# Patient Record
Sex: FEMALE | Race: WHITE | Employment: OTHER | URBAN - NONMETROPOLITAN AREA
[De-identification: names, ages, dates, MRNs, and addresses within clinical notes are randomized per-mention and may not be internally consistent; named-entity substitution may affect disease eponyms.]

---

## 2017-07-07 ENCOUNTER — OFFICE VISIT (OUTPATIENT)
Dept: CARDIOLOGY | Age: 72
End: 2017-07-07

## 2017-07-07 VITALS
BODY MASS INDEX: 35.99 KG/M2 | SYSTOLIC BLOOD PRESSURE: 112 MMHG | WEIGHT: 216 LBS | HEART RATE: 68 BPM | DIASTOLIC BLOOD PRESSURE: 66 MMHG | HEIGHT: 65 IN

## 2017-07-07 DIAGNOSIS — E78.01 FAMILIAL HYPERCHOLESTEROLEMIA: ICD-10-CM

## 2017-07-07 DIAGNOSIS — I25.10 CORONARY ARTERY DISEASE INVOLVING NATIVE CORONARY ARTERY OF NATIVE HEART WITHOUT ANGINA PECTORIS: ICD-10-CM

## 2017-07-07 DIAGNOSIS — I10 ESSENTIAL HYPERTENSION: Primary | ICD-10-CM

## 2017-07-07 PROCEDURE — 93000 ELECTROCARDIOGRAM COMPLETE: CPT | Performed by: NUCLEAR MEDICINE

## 2017-07-07 PROCEDURE — 99213 OFFICE O/P EST LOW 20 MIN: CPT | Performed by: NUCLEAR MEDICINE

## 2017-07-11 ENCOUNTER — OFFICE VISIT (OUTPATIENT)
Dept: FAMILY MEDICINE CLINIC | Age: 72
End: 2017-07-11

## 2017-07-11 VITALS
WEIGHT: 214.8 LBS | HEART RATE: 74 BPM | RESPIRATION RATE: 12 BRPM | HEIGHT: 65 IN | BODY MASS INDEX: 35.79 KG/M2 | DIASTOLIC BLOOD PRESSURE: 66 MMHG | SYSTOLIC BLOOD PRESSURE: 112 MMHG

## 2017-07-11 DIAGNOSIS — K21.9 GASTROESOPHAGEAL REFLUX DISEASE, ESOPHAGITIS PRESENCE NOT SPECIFIED: ICD-10-CM

## 2017-07-11 DIAGNOSIS — E78.00 HYPERCHOLESTEROLEMIA: ICD-10-CM

## 2017-07-11 DIAGNOSIS — F32.A DEPRESSION, UNSPECIFIED DEPRESSION TYPE: ICD-10-CM

## 2017-07-11 DIAGNOSIS — R53.83 FATIGUE, UNSPECIFIED TYPE: ICD-10-CM

## 2017-07-11 DIAGNOSIS — E11.9 TYPE 2 DIABETES MELLITUS WITHOUT COMPLICATION, WITHOUT LONG-TERM CURRENT USE OF INSULIN (HCC): ICD-10-CM

## 2017-07-11 DIAGNOSIS — I10 BENIGN ESSENTIAL HTN: ICD-10-CM

## 2017-07-11 DIAGNOSIS — N32.81 OAB (OVERACTIVE BLADDER): ICD-10-CM

## 2017-07-11 DIAGNOSIS — Z11.59 NEED FOR HEPATITIS C SCREENING TEST: ICD-10-CM

## 2017-07-11 DIAGNOSIS — F41.9 ANXIETY: Primary | ICD-10-CM

## 2017-07-11 PROCEDURE — 99214 OFFICE O/P EST MOD 30 MIN: CPT | Performed by: FAMILY MEDICINE

## 2017-07-11 RX ORDER — LORAZEPAM 0.5 MG/1
0.5 TABLET ORAL EVERY 6 HOURS PRN
Qty: 60 TABLET | Refills: 2 | Status: SHIPPED | OUTPATIENT
Start: 2017-07-11 | End: 2017-10-31 | Stop reason: SDUPTHER

## 2017-07-11 RX ORDER — HYDROCHLOROTHIAZIDE 25 MG/1
25 TABLET ORAL DAILY
Qty: 90 TABLET | Refills: 3 | Status: SHIPPED | OUTPATIENT
Start: 2017-07-11 | End: 2018-07-24 | Stop reason: SDUPTHER

## 2017-07-11 RX ORDER — PROPRANOLOL HYDROCHLORIDE 120 MG/1
120 CAPSULE, EXTENDED RELEASE ORAL DAILY
Qty: 90 CAPSULE | Refills: 3 | Status: SHIPPED | OUTPATIENT
Start: 2017-07-11 | End: 2018-07-24 | Stop reason: SDUPTHER

## 2017-07-11 RX ORDER — CITALOPRAM 20 MG/1
20 TABLET ORAL 2 TIMES DAILY
Qty: 180 TABLET | Refills: 3 | Status: SHIPPED | OUTPATIENT
Start: 2017-07-11 | End: 2018-07-24 | Stop reason: SDUPTHER

## 2017-07-11 RX ORDER — RANITIDINE 150 MG/1
150 CAPSULE ORAL 2 TIMES DAILY
Qty: 180 CAPSULE | Refills: 3 | Status: SHIPPED | OUTPATIENT
Start: 2017-07-11 | End: 2018-07-24 | Stop reason: SDUPTHER

## 2017-07-11 RX ORDER — SIMVASTATIN 40 MG
40 TABLET ORAL NIGHTLY
Qty: 90 TABLET | Refills: 3 | Status: SHIPPED | OUTPATIENT
Start: 2017-07-11 | End: 2018-07-24 | Stop reason: SDUPTHER

## 2017-07-11 RX ORDER — TOLTERODINE 4 MG/1
4 CAPSULE, EXTENDED RELEASE ORAL DAILY
Qty: 90 CAPSULE | Refills: 3 | Status: SHIPPED | OUTPATIENT
Start: 2017-07-11 | End: 2018-04-04

## 2017-07-11 RX ORDER — CLOTRIMAZOLE AND BETAMETHASONE DIPROPIONATE 10; .64 MG/G; MG/G
CREAM TOPICAL
Refills: 0 | COMMUNITY
Start: 2017-05-09 | End: 2017-07-11 | Stop reason: ALTCHOICE

## 2017-07-11 ASSESSMENT — ENCOUNTER SYMPTOMS
VOMITING: 0
SORE THROAT: 0
WHEEZING: 0
BLOOD IN STOOL: 0
CHEST TIGHTNESS: 0
ABDOMINAL PAIN: 0
SHORTNESS OF BREATH: 0
DIARRHEA: 0
EYE PAIN: 0
COUGH: 0
RHINORRHEA: 0
CONSTIPATION: 0
BACK PAIN: 0
NAUSEA: 0

## 2017-07-11 ASSESSMENT — PATIENT HEALTH QUESTIONNAIRE - PHQ9
1. LITTLE INTEREST OR PLEASURE IN DOING THINGS: 0
SUM OF ALL RESPONSES TO PHQ9 QUESTIONS 1 & 2: 0
2. FEELING DOWN, DEPRESSED OR HOPELESS: 0
SUM OF ALL RESPONSES TO PHQ QUESTIONS 1-9: 0

## 2017-07-12 ENCOUNTER — TELEPHONE (OUTPATIENT)
Dept: FAMILY MEDICINE CLINIC | Age: 72
End: 2017-07-12

## 2017-07-12 LAB
ABSOLUTE BASO #: 0.1 K/UL (ref 0–0.1)
ABSOLUTE EOS #: 0.2 K/UL (ref 0.1–0.4)
ABSOLUTE LYMPH #: 2.9 K/UL (ref 0.8–5.2)
ABSOLUTE MONO #: 0.7 K/UL (ref 0.1–0.9)
ABSOLUTE NEUT #: 5.8 K/UL (ref 1.3–9.1)
ALBUMIN SERPL-MCNC: 4 G/DL (ref 3.2–5.3)
ALK PHOSPHATASE: 113 IU/L (ref 35–121)
ALT SERPL-CCNC: 14 IU/L (ref 5–59)
ANION GAP SERPL CALCULATED.3IONS-SCNC: 14 MMOL/L
AST SERPL-CCNC: 16 IU/L (ref 10–42)
AVERAGE GLUCOSE: 120 MG/DL (ref 66–114)
BASOPHILS RELATIVE PERCENT: 0.5 %
BILIRUB SERPL-MCNC: 0.4 MG/DL (ref 0.2–1.3)
BUN BLDV-MCNC: 13 MG/DL (ref 9–24)
CALCIUM SERPL-MCNC: 9.1 MG/DL (ref 8.7–10.8)
CHLORIDE BLD-SCNC: 100 MMOL/L (ref 95–111)
CHOLESTEROL/HDL RATIO: 3.3
CHOLESTEROL: 203 MG/DL
CO2: 29 MMOL/L (ref 21–32)
CREAT SERPL-MCNC: 0.9 MG/DL (ref 0.5–1.3)
EGFR AFRICAN AMERICAN: 75
EGFR IF NONAFRICAN AMERICAN: 62
EOSINOPHILS RELATIVE PERCENT: 1.5 %
GLUCOSE: 102 MG/DL (ref 70–100)
HBA1C MFR BLD: 5.8 % (ref 4.2–5.8)
HCT VFR BLD CALC: 38.7 % (ref 36–48)
HDLC SERPL-MCNC: 61 MG/DL (ref 40–60)
HEMOGLOBIN: 12.6 G/DL (ref 12–16)
HEPATITIS C ANTIBODY: NEGATIVE
LDL CHOLESTEROL CALCULATED: 106 MG/DL
LDL/HDL RATIO: 1.7
LYMPHOCYTE %: 30.3 %
MCH RBC QN AUTO: 28.8 PG (ref 27–34)
MCHC RBC AUTO-ENTMCNC: 32.6 G/DL (ref 31–36)
MCV RBC AUTO: 88.4 FL (ref 80–100)
MONOCYTES # BLD: 7.4 %
NEUTROPHILS RELATIVE PERCENT: 59.9 %
PDW BLD-RTO: 14.1 % (ref 10.8–14.8)
PLATELETS: 295 K/UL (ref 150–450)
POTASSIUM SERPL-SCNC: 4 MMOL/L (ref 3.5–5.4)
RBC: 4.38 M/UL (ref 4–5.5)
SODIUM BLD-SCNC: 139 MMOL/L (ref 134–147)
T4 FREE: 1.01 NG/DL (ref 0.8–1.8)
TOTAL PROTEIN: 6.4 G/DL (ref 5.8–8)
TRIGL SERPL-MCNC: 182 MG/DL
TSH SERPL DL<=0.05 MIU/L-ACNC: 1.71 UIU/ML (ref 0.4–4.4)
VLDLC SERPL CALC-MCNC: 36 MG/DL
WBC: 9.7 K/UL (ref 3.7–10.8)

## 2017-07-17 ENCOUNTER — HOSPITAL ENCOUNTER (OUTPATIENT)
Dept: WOMENS IMAGING | Age: 72
Discharge: HOME | End: 2017-07-17
Payer: MEDICARE

## 2017-07-17 DIAGNOSIS — Z13.9 VISIT FOR SCREENING: ICD-10-CM

## 2017-07-17 PROCEDURE — 77063 BREAST TOMOSYNTHESIS BI: CPT

## 2017-07-27 RX ORDER — NITROGLYCERIN 0.4 MG/1
0.4 TABLET SUBLINGUAL EVERY 5 MIN PRN
Qty: 25 TABLET | Refills: 1 | Status: SHIPPED | OUTPATIENT
Start: 2017-07-27 | End: 2019-07-10 | Stop reason: SDUPTHER

## 2017-08-28 ENCOUNTER — HOSPITAL ENCOUNTER (OUTPATIENT)
Dept: PULMONOLOGY | Age: 72
Discharge: HOME | End: 2017-08-28
Payer: MEDICARE

## 2017-08-28 DIAGNOSIS — Z78.0 ASYMPTOMATIC MENOPAUSAL STATE: ICD-10-CM

## 2017-08-28 PROCEDURE — 94010 BREATHING CAPACITY TEST: CPT

## 2017-08-28 PROCEDURE — 94729 DIFFUSING CAPACITY: CPT

## 2017-08-28 PROCEDURE — 94726 PLETHYSMOGRAPHY LUNG VOLUMES: CPT

## 2017-09-21 ENCOUNTER — OFFICE VISIT (OUTPATIENT)
Dept: FAMILY MEDICINE CLINIC | Age: 72
End: 2017-09-21
Payer: MEDICARE

## 2017-09-21 VITALS
DIASTOLIC BLOOD PRESSURE: 68 MMHG | TEMPERATURE: 98.9 F | BODY MASS INDEX: 36.54 KG/M2 | RESPIRATION RATE: 14 BRPM | WEIGHT: 216.2 LBS | OXYGEN SATURATION: 96 % | SYSTOLIC BLOOD PRESSURE: 110 MMHG | HEART RATE: 68 BPM

## 2017-09-21 DIAGNOSIS — J20.9 ACUTE BRONCHITIS, UNSPECIFIED ORGANISM: Primary | ICD-10-CM

## 2017-09-21 PROCEDURE — 99213 OFFICE O/P EST LOW 20 MIN: CPT | Performed by: FAMILY MEDICINE

## 2017-09-21 RX ORDER — DOXYCYCLINE HYCLATE 100 MG
100 TABLET ORAL 2 TIMES DAILY
Qty: 20 TABLET | Refills: 0 | Status: SHIPPED | OUTPATIENT
Start: 2017-09-21 | End: 2018-09-05 | Stop reason: SDUPTHER

## 2017-09-21 RX ORDER — GUAIFENESIN AND CODEINE PHOSPHATE 100; 10 MG/5ML; MG/5ML
SOLUTION ORAL
Qty: 180 ML | Refills: 0 | Status: SHIPPED | OUTPATIENT
Start: 2017-09-21 | End: 2018-09-05 | Stop reason: SDUPTHER

## 2017-09-21 ASSESSMENT — ENCOUNTER SYMPTOMS
VISUAL CHANGE: 0
DIARRHEA: 0
RHINORRHEA: 0
NAUSEA: 0
ABDOMINAL PAIN: 0
SHORTNESS OF BREATH: 0
WHEEZING: 0
CHANGE IN BOWEL HABIT: 0
SWOLLEN GLANDS: 1
BLOOD IN STOOL: 0
EYE PAIN: 0
CHEST TIGHTNESS: 0
CONSTIPATION: 0
BACK PAIN: 0
COUGH: 1
SINUS PRESSURE: 1
SORE THROAT: 1
VOMITING: 0

## 2017-10-02 ENCOUNTER — IMMUNIZATION (OUTPATIENT)
Dept: FAMILY MEDICINE CLINIC | Age: 72
End: 2017-10-02
Payer: MEDICARE

## 2017-10-02 DIAGNOSIS — Z23 NEED FOR INFLUENZA VACCINATION: Primary | ICD-10-CM

## 2017-10-02 PROCEDURE — 90662 IIV NO PRSV INCREASED AG IM: CPT | Performed by: FAMILY MEDICINE

## 2017-10-02 PROCEDURE — G0008 ADMIN INFLUENZA VIRUS VAC: HCPCS | Performed by: FAMILY MEDICINE

## 2017-10-31 DIAGNOSIS — F41.9 ANXIETY: ICD-10-CM

## 2017-11-01 RX ORDER — LORAZEPAM 0.5 MG/1
0.5 TABLET ORAL EVERY 6 HOURS PRN
Qty: 60 TABLET | Refills: 2 | Status: SHIPPED | OUTPATIENT
Start: 2017-11-01 | End: 2018-02-21 | Stop reason: SDUPTHER

## 2017-11-01 NOTE — TELEPHONE ENCOUNTER
ep'ed ativan to pharm requested    Controlled Substances Monitoring:     Attestation: The Prescription Monitoring Report for this patient was reviewed today. Robbie Ledbetter MD)  Documentation: No signs of potential drug abuse or diversion identified.  Robbie Ledbetter MD)

## 2017-11-29 ENCOUNTER — HOSPITAL ENCOUNTER (OUTPATIENT)
Dept: GENERAL RADIOLOGY | Age: 72
Discharge: HOME | End: 2017-11-29
Payer: MEDICARE

## 2017-11-29 ENCOUNTER — HOSPITAL ENCOUNTER (OUTPATIENT)
Age: 72
Discharge: HOME | End: 2017-11-29
Payer: MEDICARE

## 2017-11-29 ENCOUNTER — HOSPITAL ENCOUNTER (OUTPATIENT)
Dept: CT IMAGING | Age: 72
Discharge: HOME | End: 2017-11-29
Payer: MEDICARE

## 2017-11-29 DIAGNOSIS — C64.2 RENAL CELL CARCINOMA OF LEFT KIDNEY (HCC): ICD-10-CM

## 2017-11-29 DIAGNOSIS — Z78.0 ASYMPTOMATIC MENOPAUSAL STATE: ICD-10-CM

## 2017-11-29 LAB
ALBUMIN SERPL-MCNC: 3.8 G/DL (ref 3.5–5.1)
ALP BLD-CCNC: 98 U/L (ref 38–126)
ALT SERPL-CCNC: 10 U/L (ref 11–66)
ANION GAP SERPL CALCULATED.3IONS-SCNC: 14 MEQ/L (ref 8–16)
ANISOCYTOSIS: ABNORMAL
AST SERPL-CCNC: 14 U/L (ref 5–40)
BASOPHILS # BLD: 0.4 %
BASOPHILS ABSOLUTE: 0 THOU/MM3 (ref 0–0.1)
BILIRUB SERPL-MCNC: 0.3 MG/DL (ref 0.3–1.2)
BUN BLDV-MCNC: 12 MG/DL (ref 7–22)
CALCIUM SERPL-MCNC: 9 MG/DL (ref 8.5–10.5)
CHLORIDE BLD-SCNC: 100 MEQ/L (ref 98–111)
CO2: 29 MEQ/L (ref 23–33)
CREAT SERPL-MCNC: 0.7 MG/DL (ref 0.4–1.2)
EOSINOPHIL # BLD: 1.6 %
EOSINOPHILS ABSOLUTE: 0.1 THOU/MM3 (ref 0–0.4)
GFR SERPL CREATININE-BSD FRML MDRD: 82 ML/MIN/1.73M2
GLUCOSE BLD-MCNC: 117 MG/DL (ref 70–108)
HCT VFR BLD CALC: 37.5 % (ref 37–47)
HEMOGLOBIN: 12.4 GM/DL (ref 12–16)
LD: 159 U/L (ref 100–190)
LYMPHOCYTES # BLD: 25 %
LYMPHOCYTES ABSOLUTE: 2.1 THOU/MM3 (ref 1–4.8)
MCH RBC QN AUTO: 29.3 PG (ref 27–31)
MCHC RBC AUTO-ENTMCNC: 33 GM/DL (ref 33–37)
MCV RBC AUTO: 88.8 FL (ref 81–99)
MONOCYTES # BLD: 8.4 %
MONOCYTES ABSOLUTE: 0.7 THOU/MM3 (ref 0.4–1.3)
NUCLEATED RED BLOOD CELLS: 0 /100 WBC
PDW BLD-RTO: 15.4 % (ref 11.5–14.5)
PLATELET # BLD: 268 THOU/MM3 (ref 130–400)
PMV BLD AUTO: 9 MCM (ref 7.4–10.4)
POC CREATININE WHOLE BLOOD: 0.8 MG/DL (ref 0.5–1.2)
POTASSIUM SERPL-SCNC: 3.7 MEQ/L (ref 3.5–5.2)
RBC # BLD: 4.22 MILL/MM3 (ref 4.2–5.4)
SEG NEUTROPHILS: 64.6 %
SEGMENTED NEUTROPHILS ABSOLUTE COUNT: 5.4 THOU/MM3 (ref 1.8–7.7)
SODIUM BLD-SCNC: 143 MEQ/L (ref 135–145)
TOTAL PROTEIN: 6.7 G/DL (ref 6.1–8)
WBC # BLD: 8.3 THOU/MM3 (ref 4.8–10.8)

## 2017-11-29 PROCEDURE — 80053 COMPREHEN METABOLIC PANEL: CPT

## 2017-11-29 PROCEDURE — 71020 XR CHEST STANDARD TWO VW: CPT

## 2017-11-29 PROCEDURE — 82565 ASSAY OF CREATININE: CPT

## 2017-11-29 PROCEDURE — 83615 LACTATE (LD) (LDH) ENZYME: CPT

## 2017-11-29 PROCEDURE — 6360000004 HC RX CONTRAST MEDICATION: Performed by: PHYSICIAN ASSISTANT

## 2017-11-29 PROCEDURE — 74178 CT ABD&PLV WO CNTR FLWD CNTR: CPT

## 2017-11-29 PROCEDURE — 85025 COMPLETE CBC W/AUTO DIFF WBC: CPT

## 2017-11-29 PROCEDURE — 36415 COLL VENOUS BLD VENIPUNCTURE: CPT

## 2017-11-29 RX ADMIN — IOPAMIDOL 85 ML: 755 INJECTION, SOLUTION INTRAVENOUS at 10:41

## 2017-11-29 RX ADMIN — IOHEXOL 50 ML: 240 INJECTION, SOLUTION INTRATHECAL; INTRAVASCULAR; INTRAVENOUS; ORAL at 10:42

## 2017-11-30 ENCOUNTER — TELEPHONE (OUTPATIENT)
Dept: UROLOGY | Age: 72
End: 2017-11-30

## 2017-11-30 NOTE — TELEPHONE ENCOUNTER
Patient is calling and was wanting to get her CT results for the insurance.  Please advise at [phone]

## 2017-12-04 ENCOUNTER — TELEPHONE (OUTPATIENT)
Dept: UROLOGY | Age: 72
End: 2017-12-04

## 2017-12-07 ENCOUNTER — OFFICE VISIT (OUTPATIENT)
Dept: UROLOGY | Age: 72
End: 2017-12-07
Payer: MEDICARE

## 2017-12-07 VITALS
BODY MASS INDEX: 35.29 KG/M2 | SYSTOLIC BLOOD PRESSURE: 114 MMHG | DIASTOLIC BLOOD PRESSURE: 70 MMHG | WEIGHT: 211.8 LBS | HEIGHT: 65 IN

## 2017-12-07 DIAGNOSIS — C64.2 RENAL CELL CARCINOMA OF LEFT KIDNEY (HCC): Primary | ICD-10-CM

## 2017-12-07 DIAGNOSIS — R31.29 MICROSCOPIC HEMATURIA: ICD-10-CM

## 2017-12-07 LAB
BACTERIA: ABNORMAL /HPF
BILIRUBIN URINE: NEGATIVE
BILIRUBIN URINE: NEGATIVE
BLOOD URINE, POC: NORMAL
BLOOD, URINE: NEGATIVE
CASTS 2: ABNORMAL /LPF
CASTS UA: ABNORMAL /LPF
CHARACTER, URINE: CLEAR
CHARACTER, URINE: CLEAR
COLOR, URINE: YELLOW
COLOR: YELLOW
CRYSTALS, UA: ABNORMAL
EPITHELIAL CELLS, UA: ABNORMAL /HPF
GLUCOSE URINE: NEGATIVE MG/DL
GLUCOSE URINE: NEGATIVE MG/DL
KETONES, URINE: NEGATIVE
KETONES, URINE: NEGATIVE
LEUKOCYTE CLUMPS, URINE: NORMAL
LEUKOCYTE ESTERASE, URINE: ABNORMAL
MISCELLANEOUS 2: ABNORMAL
NITRITE, URINE: NEGATIVE
NITRITE, URINE: NEGATIVE
PH UA: 6
PH, URINE: 6
PROTEIN UA: NEGATIVE
PROTEIN, URINE: NEGATIVE MG/DL
RBC URINE: ABNORMAL /HPF
RENAL EPITHELIAL, UA: ABNORMAL
SPECIFIC GRAVITY, URINE: 1.02 (ref 1–1.03)
SPECIFIC GRAVITY, URINE: 1.02 (ref 1–1.03)
UROBILINOGEN, URINE: 0.2 EU/DL
UROBILINOGEN, URINE: 0.2 EU/DL
WBC UA: ABNORMAL /HPF
YEAST: ABNORMAL

## 2017-12-07 PROCEDURE — 81003 URINALYSIS AUTO W/O SCOPE: CPT | Performed by: PHYSICIAN ASSISTANT

## 2017-12-07 PROCEDURE — 99213 OFFICE O/P EST LOW 20 MIN: CPT | Performed by: PHYSICIAN ASSISTANT

## 2017-12-07 NOTE — PROGRESS NOTES
Not on file     Other Topics Concern    Not on file     Social History Narrative    No narrative on file       Review of Systems  No problems with ears, nose or throat. No problems with eyes. No chest pain, shortness of breath, abdominal pain, extremity pain or weakness, and no neurological deficits. No rashes. No swollen glands or lymph nodes.  symptoms per HPI. The remainder of the review of symptoms is negative. Exam    /70   Ht 5' 5\" (1.651 m)   Wt 211 lb 12.8 oz (96.1 kg)   BMI 35.25 kg/m²     General: alert and oriented. Cooperative. HENT: Normocephalic, Atraumatic. Eyes: No scleral icterus, mucous membranes moist.  Respiratory: Effort normal. Clear bilaterally without crackles, rhonchi, wheezing. Heart: Regular rate and rhythm. S1 and S2 normal.   Abdomen: Soft. Non-tender. Non-distended. Active bowel sounds. No CVA tenderness. Neuro: No deficits. Extremities: No pitting edema or calf tenderness. Skin: No rashes or obvious lesions. Nursing note and vitals reviewed. Labs    Results for POC orders placed in visit on 12/07/17   POCT Urinalysis No Micro (Auto)   Result Value Ref Range    Glucose, Ur Negative NEGATIVE mg/dl    Bilirubin Urine Negative     Ketones, Urine Negative NEGATIVE    Specific Gravity, Urine 1.020 1.002 - 1.03    Blood, UA POC Trace-intact NEGATIVE    pH, Urine 6.00 5.0 - 9.0    Protein, Urine Negative NEGATIVE mg/dl    Urobilinogen, Urine 0.20 0.0 - 1.0 eu/dl    Nitrite, Urine Negative NEGATIVE    Leukocyte Clumps, Urine Moderate NEGATIVE    Color, Urine Yellow YELLOW-STR    Character, Urine Clear CLR-SL.[id number]       Lab Results   Component Value Date    CREATININE 0.7 11/29/2017    BUN 12 11/29/2017     11/29/2017    K 3.7 11/29/2017     11/29/2017    CO2 29 11/29/2017     Radiology: CT abdomen and pelvis    FINDINGS: The liver size and attenuation is satisfactory, stable. There is no evidence of focal lesion within the liver.  The gallbladder surgically absent. There is no convincing biliary ductal abnormality. Portal venous system is acceptable. Pancreas is    unchanged. Spleen is within normal limits.       The bowels are decompressed. Mild diverticulosis of the distal colon redemonstrated. Mesenteric fat is clear. Normal appendix. No evidence of free fluid or free air.       Stable postsurgical appearance of the left kidney. Postcontrast images reveal stable homogeneous uptake of contrast in the remaining cortex. There is no convincing focal lesion. Stable appearance of the contour of the left kidney. There are no    pathologically enlarged retroperitoneal lymph nodes. No radiopaque nephrolithiasis. No hydronephrosis.       Stable and satisfactory appearance of both adrenal glands.       Retrograde have vasculature is unchanged, with diffuse atherosclerotic plaque to the aorta redemonstrated, mild. Retroperitoneal fat is clear throughout.       Pelvic sidewalls are clear. Uterus and adnexa have no concerning findings. Urine in bladder is acceptable. Pelvic floor soft tissues are grossly satisfactory.       Images of the lung bases have no evidence of focal abnormal mass. There is some stranding of the inferior lung parenchyma bilaterally to indicate fibrosis and/or platelike atelectasis. This is similar to the appearance of the imaged lung parenchyma on    the most recent CT.       The osseous framework is grossly stable, with no findings of acute or aggressive osseous abnormality. Soft tissues are nonspecific.           Impression   STABLE POSTSURGICAL APPEARANCE OF THE LEFT KIDNEY, WITH STABLE EXAMINATION OF THE ABDOMEN AND PELVIS. NO FINDINGS OF DISEASE PROGRESSION OR METASTATIC DISEASE. NO ACUTE ABNORMALITY. CXR:  FINDINGS: The heart, mediastinum and pulmonary vasculature appear stable. There is stable eventration of the right hemidiaphragm posteriorly. No new or acute pulmonary process is demonstrated.  There are degenerative changes along the

## 2017-12-08 LAB
ORGANISM: ABNORMAL
URINE CULTURE REFLEX: ABNORMAL

## 2017-12-10 ENCOUNTER — TELEPHONE (OUTPATIENT)
Dept: UROLOGY | Age: 72
End: 2017-12-10

## 2017-12-10 DIAGNOSIS — N30.00 ACUTE CYSTITIS WITHOUT HEMATURIA: Primary | ICD-10-CM

## 2017-12-10 RX ORDER — SULFAMETHOXAZOLE AND TRIMETHOPRIM 800; 160 MG/1; MG/1
1 TABLET ORAL 2 TIMES DAILY
Qty: 6 TABLET | Refills: 0 | Status: SHIPPED | OUTPATIENT
Start: 2017-12-10 | End: 2017-12-13

## 2017-12-10 NOTE — PROGRESS NOTES
I have reviewed the patients chart and Jr Walker has discussed relevant portions with me. I agree with the assessment and plan.

## 2017-12-27 DIAGNOSIS — J45.40 MODERATE PERSISTENT ASTHMA WITHOUT COMPLICATION: Primary | ICD-10-CM

## 2017-12-27 RX ORDER — BUDESONIDE AND FORMOTEROL FUMARATE DIHYDRATE 160; 4.5 UG/1; UG/1
2 AEROSOL RESPIRATORY (INHALATION) 2 TIMES DAILY
Qty: 1 INHALER | Refills: 6 | Status: SHIPPED | OUTPATIENT
Start: 2017-12-27 | End: 2018-07-24 | Stop reason: SDUPTHER

## 2017-12-27 NOTE — TELEPHONE ENCOUNTER
Pt Noris Kay ran out of Symbicort yesterday; Dr Brianda Willard who was prescribing before is no longer her pulmonologist, so she's asking you to take over refills. Uses [facility]. Pls call pt at [phone] only if probs.   Last ov: 9/21/17 bronchitis

## 2018-02-21 DIAGNOSIS — F41.9 ANXIETY: ICD-10-CM

## 2018-02-21 RX ORDER — LORAZEPAM 0.5 MG/1
0.5 TABLET ORAL EVERY 6 HOURS PRN
Qty: 60 TABLET | Refills: 0 | Status: SHIPPED | OUTPATIENT
Start: 2018-02-21 | End: 2018-04-04 | Stop reason: SDUPTHER

## 2018-02-21 NOTE — TELEPHONE ENCOUNTER
Patient notified 1 month supply of ativan sent to the pharm and will be due for 6 month appt for further refills, pt verbalized understanding.

## 2018-02-21 NOTE — TELEPHONE ENCOUNTER
Edilson Manual needs refill of   Requested Prescriptions     Pending Prescriptions Disp Refills    LORazepam (ATIVAN) 0.5 MG tablet 60 tablet 2     Sig: Take 1 tablet by mouth every 6 hours as needed for Anxiety F41.9.        Last Filled on:  11/1/17 #60/2    Last Visit Date:  9/21/17 bronchitis      Next Visit Date:    Visit date not found  pending

## 2018-04-04 ENCOUNTER — OFFICE VISIT (OUTPATIENT)
Dept: FAMILY MEDICINE CLINIC | Age: 73
End: 2018-04-04
Payer: MEDICARE

## 2018-04-04 VITALS
HEIGHT: 65 IN | HEART RATE: 78 BPM | DIASTOLIC BLOOD PRESSURE: 66 MMHG | SYSTOLIC BLOOD PRESSURE: 100 MMHG | RESPIRATION RATE: 12 BRPM | BODY MASS INDEX: 35.69 KG/M2 | WEIGHT: 214.2 LBS

## 2018-04-04 DIAGNOSIS — F41.9 ANXIETY: ICD-10-CM

## 2018-04-04 PROCEDURE — 99213 OFFICE O/P EST LOW 20 MIN: CPT | Performed by: FAMILY MEDICINE

## 2018-04-04 RX ORDER — LORAZEPAM 0.5 MG/1
0.5 TABLET ORAL EVERY 6 HOURS PRN
Qty: 60 TABLET | Refills: 2 | Status: SHIPPED | OUTPATIENT
Start: 2018-04-04 | End: 2018-07-24 | Stop reason: SDUPTHER

## 2018-04-04 RX ORDER — CALCIUM CARBONATE 500(1250)
500 TABLET ORAL DAILY
COMMUNITY
End: 2019-06-18

## 2018-04-04 ASSESSMENT — ENCOUNTER SYMPTOMS
BACK PAIN: 0
EYE PAIN: 0
DIARRHEA: 0
RHINORRHEA: 0
COUGH: 0
WHEEZING: 0
SHORTNESS OF BREATH: 0
NAUSEA: 0
CHEST TIGHTNESS: 0
VOMITING: 0
SORE THROAT: 0
ABDOMINAL PAIN: 0
BLOOD IN STOOL: 0
CONSTIPATION: 0

## 2018-07-09 ENCOUNTER — OFFICE VISIT (OUTPATIENT)
Dept: CARDIOLOGY CLINIC | Age: 73
End: 2018-07-09
Payer: MEDICARE

## 2018-07-09 VITALS
BODY MASS INDEX: 34.23 KG/M2 | SYSTOLIC BLOOD PRESSURE: 120 MMHG | HEART RATE: 73 BPM | DIASTOLIC BLOOD PRESSURE: 72 MMHG | WEIGHT: 213 LBS | HEIGHT: 66 IN

## 2018-07-09 DIAGNOSIS — I25.10 CORONARY ARTERY DISEASE INVOLVING NATIVE CORONARY ARTERY OF NATIVE HEART WITHOUT ANGINA PECTORIS: ICD-10-CM

## 2018-07-09 DIAGNOSIS — E78.01 FAMILIAL HYPERCHOLESTEROLEMIA: ICD-10-CM

## 2018-07-09 DIAGNOSIS — I10 ESSENTIAL HYPERTENSION: Primary | ICD-10-CM

## 2018-07-09 PROCEDURE — 93000 ELECTROCARDIOGRAM COMPLETE: CPT | Performed by: NUCLEAR MEDICINE

## 2018-07-09 PROCEDURE — 99213 OFFICE O/P EST LOW 20 MIN: CPT | Performed by: NUCLEAR MEDICINE

## 2018-07-09 RX ORDER — LORAZEPAM 0.5 MG/1
TABLET ORAL
Refills: 2 | COMMUNITY
Start: 2018-06-19 | End: 2018-07-24 | Stop reason: SDUPTHER

## 2018-07-09 NOTE — PROGRESS NOTES
[facility] PROFESSIONAL SERVS  [facility]  [address]  [address]  [place] [address]  Dept: [phone]  Dept Fax: [phone]  Loc: [phone]    Visit Date: 7/9/2018    Berto Paez is a 68 y.o. female who presents today for:  Chief Complaint   Patient presents with    1 Year Follow Up    Hypertension    Coronary Artery Disease    Hyperlipidemia     Known mild CAD  Renal cancer is stable   BP is stable   On statins for hyperlipidemia  No chest pain  No changes in breathing    HPI:  HPI  Past Medical History:   Diagnosis Date    Anxiety     Arthritis     Asthma     Cancer ([address]     Depression     Diabetes mellitus ([address]     Headache(784.0)     Hyperlipidemia     Hypertension     Nausea & vomiting     Unspecified cerebral artery occlusion with cerebral infarction       Past Surgical History:   Procedure Laterality Date    BREAST BIOPSY      CARDIAC CATHETERIZATION      CHOLECYSTECTOMY      COLONOSCOPY      ENDOSCOPY, COLON, DIAGNOSTIC      FINGER SURGERY  tumor    HERNIA REPAIR      HIATAL HERNIA REPAIR  7-    stomach repair    PARTIAL NEPHRECTOMY Left 11/21/2014    robotic left partial nephrectomy    TONSILLECTOMY      TUBAL LIGATION       Family History   Problem Relation Age of Onset    Cancer Mother     Heart Disease Mother     High Blood Pressure Mother     High Cholesterol Mother     Stroke Mother     Cancer Father     Heart Disease Father     High Blood Pressure Father     High Cholesterol Father     Cancer Sister      Social History   Substance Use Topics    Smoking status: Never Smoker    Smokeless tobacco: Never Used    Alcohol use No      Current Outpatient Prescriptions   Medication Sig Dispense Refill    LORazepam (ATIVAN) 0.5 MG tablet TAKE 1 TABLET BY MOUTH EVERY 6 HOURS AS NEEDED FOR ANXIETY  2    calcium carbonate (OSCAL) 500 MG TABS tablet Take 500 mg by mouth daily      budesonide-formoterol (SYMBICORT) 160-4.5 MCG/ACT AERO  Creatinine monitoring  11/29/2018    Breast cancer screen  07/17/2019    Colon cancer screen colonoscopy  10/15/2020    Lipid screen  07/11/2022    DEXA (modify frequency per FRAX score)  Completed    Hepatitis C screen  Completed       Subjective:  Review of Systems  General:   No fever, no chills, No fatigue or weight loss  Pulmonary:    No dyspnea, no wheezing  Cardiac:    Denies recent chest pain,   GI:     No nausea or vomiting, no abdominal pain  Neuro:    No dizziness or light headedness,   Musculoskeletal:  No recent active issues  Extremities:   No edema, good peripheral pulses      Objective:  Physical Exam  /72   Pulse 73   Ht 5' 5.5\" (1.664 m)   Wt 213 lb (96.6 kg)   BMI 34.91 kg/m²   General:   Well developed, well nourished  Lungs:   Clear to auscultation  Heart:    Normal S1 S2, Slight murmur. no rubs, no gallops  Abdomen:   Soft, non tender, no organomegalies, positive bowel sounds  Extremities:   No edema, no cyanosis, good peripheral pulses  Neurological:   Awake, alert, oriented. No obvious focal deficits  Musculoskelatal:  No obvious deformities    Assessment:      Diagnosis Orders   1. Essential hypertension     2. Coronary artery disease involving native coronary artery of native heart without angina pectoris     3. Familial hypercholesterolemia     cardiac fair for now  ECG in office was done today. I reviewed the ECG. No acute findings    Plan:  No Follow-up on file. As above  Continue risk factor modification and medical management  'Thank you for allowing me to participate in the care of your patient. Please don't hesitate to contact me regarding any further issues related to the patient care    Orders Placed:  Orders Placed This Encounter   Procedures    EKG 12 Lead     Order Specific Question:   Reason for Exam?     Answer: Other       Medications Prescribed:  No orders of the defined types were placed in this encounter.          Discussed use, benefit, and side

## 2018-07-09 NOTE — PROGRESS NOTES
Patient here for 1 year follow up. EKG done today. Patient denies SOB. Chest pain. Palpitations. Edema.

## 2018-07-21 DIAGNOSIS — F32.A DEPRESSION, UNSPECIFIED DEPRESSION TYPE: ICD-10-CM

## 2018-07-23 RX ORDER — CITALOPRAM 20 MG/1
20 TABLET ORAL 2 TIMES DAILY
Qty: 180 TABLET | Refills: 3 | OUTPATIENT
Start: 2018-07-23

## 2018-07-23 NOTE — TELEPHONE ENCOUNTER
Marleny Eller needs refill of   Requested Prescriptions     Pending Prescriptions Disp Refills    citalopram (CELEXA) 20 MG tablet [Pharmacy Med Name: CITALOPRAM HBR 20 MG TABLET] 180 tablet 3     Sig: TAKE 1 TABLET BY MOUTH 2 TIMES DAILY       Last Filled on:  7/11/2017    Last Visit Date:  4/4/2018 7/11/2017 annual    Next Visit Date:    Visit date not found    Pt is due for annual appointment, please contact Pt to schedule.

## 2018-07-24 ENCOUNTER — OFFICE VISIT (OUTPATIENT)
Dept: FAMILY MEDICINE CLINIC | Age: 73
End: 2018-07-24
Payer: MEDICARE

## 2018-07-24 VITALS
BODY MASS INDEX: 36.43 KG/M2 | HEIGHT: 64 IN | DIASTOLIC BLOOD PRESSURE: 64 MMHG | WEIGHT: 213.4 LBS | RESPIRATION RATE: 12 BRPM | SYSTOLIC BLOOD PRESSURE: 100 MMHG | HEART RATE: 64 BPM

## 2018-07-24 DIAGNOSIS — Z00.00 ANNUAL PHYSICAL EXAM: Primary | ICD-10-CM

## 2018-07-24 DIAGNOSIS — E78.00 HYPERCHOLESTEROLEMIA: ICD-10-CM

## 2018-07-24 DIAGNOSIS — I10 ESSENTIAL HYPERTENSION: ICD-10-CM

## 2018-07-24 DIAGNOSIS — F32.A DEPRESSION, UNSPECIFIED DEPRESSION TYPE: ICD-10-CM

## 2018-07-24 DIAGNOSIS — Z23 NEED FOR PNEUMOCOCCAL VACCINATION: ICD-10-CM

## 2018-07-24 DIAGNOSIS — Z12.39 SCREENING FOR BREAST CANCER: ICD-10-CM

## 2018-07-24 DIAGNOSIS — E11.9 TYPE 2 DIABETES MELLITUS WITHOUT COMPLICATION, WITHOUT LONG-TERM CURRENT USE OF INSULIN (HCC): ICD-10-CM

## 2018-07-24 DIAGNOSIS — K21.9 GASTROESOPHAGEAL REFLUX DISEASE, ESOPHAGITIS PRESENCE NOT SPECIFIED: ICD-10-CM

## 2018-07-24 DIAGNOSIS — F41.9 ANXIETY: ICD-10-CM

## 2018-07-24 DIAGNOSIS — J45.40 MODERATE PERSISTENT ASTHMA WITHOUT COMPLICATION: ICD-10-CM

## 2018-07-24 DIAGNOSIS — I10 BENIGN ESSENTIAL HTN: ICD-10-CM

## 2018-07-24 LAB
ALBUMIN SERPL-MCNC: 4.2 G/DL (ref 3.5–5.1)
ALP BLD-CCNC: 116 U/L (ref 38–126)
ALT SERPL-CCNC: 12 U/L (ref 11–66)
ANION GAP SERPL CALCULATED.3IONS-SCNC: 14 MEQ/L (ref 8–16)
AST SERPL-CCNC: 17 U/L (ref 5–40)
AVERAGE GLUCOSE: 120 MG/DL (ref 70–126)
BASOPHILS # BLD: 0.5 %
BASOPHILS ABSOLUTE: 0 THOU/MM3 (ref 0–0.1)
BILIRUB SERPL-MCNC: 0.4 MG/DL (ref 0.3–1.2)
BUN BLDV-MCNC: 13 MG/DL (ref 7–22)
CALCIUM SERPL-MCNC: 9.4 MG/DL (ref 8.5–10.5)
CHLORIDE BLD-SCNC: 99 MEQ/L (ref 98–111)
CHOLESTEROL, TOTAL: 187 MG/DL (ref 100–199)
CO2: 29 MEQ/L (ref 23–33)
CREAT SERPL-MCNC: 0.8 MG/DL (ref 0.4–1.2)
CREATININE URINE POCT: 300
EOSINOPHIL # BLD: 2.1 %
EOSINOPHILS ABSOLUTE: 0.2 THOU/MM3 (ref 0–0.4)
ERYTHROCYTE [DISTWIDTH] IN BLOOD BY AUTOMATED COUNT: 14.6 % (ref 11.5–14.5)
ERYTHROCYTE [DISTWIDTH] IN BLOOD BY AUTOMATED COUNT: 48.3 FL (ref 35–45)
GFR SERPL CREATININE-BSD FRML MDRD: 70 ML/MIN/1.73M2
GLUCOSE BLD-MCNC: 119 MG/DL (ref 70–108)
HBA1C MFR BLD: 6 % (ref 4.4–6.4)
HCT VFR BLD CALC: 40 % (ref 37–47)
HDLC SERPL-MCNC: 60 MG/DL
HEMOGLOBIN: 13.2 GM/DL (ref 12–16)
IMMATURE GRANS (ABS): 0.03 THOU/MM3 (ref 0–0.07)
IMMATURE GRANULOCYTES: 0.3 %
LDL CHOLESTEROL CALCULATED: 95 MG/DL
LYMPHOCYTES # BLD: 29.1 %
LYMPHOCYTES ABSOLUTE: 2.6 THOU/MM3 (ref 1–4.8)
MCH RBC QN AUTO: 29.6 PG (ref 26–33)
MCHC RBC AUTO-ENTMCNC: 33 GM/DL (ref 32.2–35.5)
MCV RBC AUTO: 89.7 FL (ref 81–99)
MICROALBUMIN/CREAT 24H UR: 80 MG/G{CREAT}
MICROALBUMIN/CREAT UR-RTO: NORMAL
MONOCYTES # BLD: 7.3 %
MONOCYTES ABSOLUTE: 0.6 THOU/MM3 (ref 0.4–1.3)
NUCLEATED RED BLOOD CELLS: 0 /100 WBC
PLATELET # BLD: 313 THOU/MM3 (ref 130–400)
PMV BLD AUTO: 11 FL (ref 9.4–12.4)
POTASSIUM SERPL-SCNC: 4.2 MEQ/L (ref 3.5–5.2)
RBC # BLD: 4.46 MILL/MM3 (ref 4.2–5.4)
SEG NEUTROPHILS: 60.7 %
SEGMENTED NEUTROPHILS ABSOLUTE COUNT: 5.4 THOU/MM3 (ref 1.8–7.7)
SODIUM BLD-SCNC: 142 MEQ/L (ref 135–145)
TOTAL PROTEIN: 7.1 G/DL (ref 6.1–8)
TRIGL SERPL-MCNC: 160 MG/DL (ref 0–199)
WBC # BLD: 8.9 THOU/MM3 (ref 4.8–10.8)

## 2018-07-24 PROCEDURE — 99214 OFFICE O/P EST MOD 30 MIN: CPT | Performed by: FAMILY MEDICINE

## 2018-07-24 PROCEDURE — 36415 COLL VENOUS BLD VENIPUNCTURE: CPT | Performed by: FAMILY MEDICINE

## 2018-07-24 PROCEDURE — 90732 PPSV23 VACC 2 YRS+ SUBQ/IM: CPT | Performed by: FAMILY MEDICINE

## 2018-07-24 PROCEDURE — 82044 UR ALBUMIN SEMIQUANTITATIVE: CPT | Performed by: FAMILY MEDICINE

## 2018-07-24 PROCEDURE — G0009 ADMIN PNEUMOCOCCAL VACCINE: HCPCS | Performed by: FAMILY MEDICINE

## 2018-07-24 RX ORDER — SIMVASTATIN 40 MG
40 TABLET ORAL NIGHTLY
Qty: 90 TABLET | Refills: 3 | Status: SHIPPED | OUTPATIENT
Start: 2018-07-24 | End: 2019-07-09 | Stop reason: SDUPTHER

## 2018-07-24 RX ORDER — LORAZEPAM 0.5 MG/1
0.5 TABLET ORAL EVERY 6 HOURS PRN
Qty: 60 TABLET | Refills: 2 | Status: SHIPPED | OUTPATIENT
Start: 2018-07-24 | End: 2018-11-06 | Stop reason: SDUPTHER

## 2018-07-24 RX ORDER — HYDROCHLOROTHIAZIDE 25 MG/1
25 TABLET ORAL DAILY
Qty: 90 TABLET | Refills: 3 | Status: SHIPPED | OUTPATIENT
Start: 2018-07-24 | End: 2019-04-16 | Stop reason: ALTCHOICE

## 2018-07-24 RX ORDER — CITALOPRAM 20 MG/1
20 TABLET ORAL 2 TIMES DAILY
Qty: 180 TABLET | Refills: 3 | Status: SHIPPED | OUTPATIENT
Start: 2018-07-24 | End: 2019-07-09 | Stop reason: SDUPTHER

## 2018-07-24 RX ORDER — BUDESONIDE AND FORMOTEROL FUMARATE DIHYDRATE 160; 4.5 UG/1; UG/1
2 AEROSOL RESPIRATORY (INHALATION) 2 TIMES DAILY
Qty: 1 INHALER | Refills: 11 | Status: SHIPPED | OUTPATIENT
Start: 2018-07-24 | End: 2019-07-09 | Stop reason: SDUPTHER

## 2018-07-24 RX ORDER — ACETAMINOPHEN 500 MG
500 TABLET ORAL EVERY 6 HOURS PRN
COMMUNITY

## 2018-07-24 RX ORDER — RANITIDINE 150 MG/1
150 CAPSULE ORAL 2 TIMES DAILY
Qty: 180 CAPSULE | Refills: 3 | Status: SHIPPED | OUTPATIENT
Start: 2018-07-24 | End: 2019-07-09 | Stop reason: SDUPTHER

## 2018-07-24 RX ORDER — ALBUTEROL SULFATE 90 UG/1
AEROSOL, METERED RESPIRATORY (INHALATION)
Qty: 1 INHALER | Refills: 11 | Status: SHIPPED | OUTPATIENT
Start: 2018-07-24 | End: 2019-04-16 | Stop reason: SDUPTHER

## 2018-07-24 RX ORDER — PROPRANOLOL HYDROCHLORIDE 120 MG/1
120 CAPSULE, EXTENDED RELEASE ORAL DAILY
Qty: 90 CAPSULE | Refills: 3 | Status: SHIPPED | OUTPATIENT
Start: 2018-07-24 | End: 2019-07-09 | Stop reason: SDUPTHER

## 2018-07-24 ASSESSMENT — ENCOUNTER SYMPTOMS
ABDOMINAL PAIN: 0
SHORTNESS OF BREATH: 0
SORE THROAT: 0
BACK PAIN: 0
VOMITING: 0
CONSTIPATION: 0
BLOOD IN STOOL: 0
EYE PAIN: 0
WHEEZING: 0
CHEST TIGHTNESS: 0
NAUSEA: 0
DIARRHEA: 0
RHINORRHEA: 0
COUGH: 0

## 2018-07-24 ASSESSMENT — PATIENT HEALTH QUESTIONNAIRE - PHQ9
2. FEELING DOWN, DEPRESSED OR HOPELESS: 0
SUM OF ALL RESPONSES TO PHQ9 QUESTIONS 1 & 2: 0
1. LITTLE INTEREST OR PLEASURE IN DOING THINGS: 0
SUM OF ALL RESPONSES TO PHQ QUESTIONS 1-9: 0

## 2018-07-24 NOTE — PATIENT INSTRUCTIONS
health and other things that can increase your risk for heart attack and stroke. · Blood pressure. Have your blood pressure checked during a routine doctor visit. Your doctor will tell you how often to check your blood pressure based on your age, your blood pressure results, and other factors. · Diabetes. Ask your doctor whether you should have tests for diabetes. · Vision. Experts recommend that you have yearly exams for glaucoma and other age-related eye problems. · Hearing. Tell your doctor if you notice any change in your hearing. You can have tests to find out how well you hear. · Colon cancer tests. Keep having colon cancer tests as your doctor recommends. You can have one of several types of tests. · Heart attack and stroke risk. At least every 4 to 6 years, you should have your risk for heart attack and stroke assessed. Your doctor uses factors such as your age, blood pressure, cholesterol, and whether you smoke or have diabetes to show what your risk for a heart attack or stroke is over the next 10 years. · Osteoporosis. Talk to your doctor about whether you should have a bone density test to find out whether you have thinning bones. Also ask your doctor about whether you should take calcium and vitamin D supplements. For women  · Pap test and pelvic exam. You may no longer need a Pap test. Talk with your doctor about whether to stop or continue to have Pap tests. · Breast exam and mammogram. Ask how often you should have a mammogram, which is an X-ray of your breasts. A mammogram can spot breast cancer before it can be felt and when it is easiest to treat. · Thyroid disease. Talk to your doctor about whether to have your thyroid checked as part of a regular physical exam. Women have an increased chance of a thyroid problem. For men  · Prostate exam. Talk to your doctor about whether you should have a blood test (called a PSA test) for prostate cancer.  Experts disagree on whether men should have this test. Some experts recommend that you discuss the benefits and risks of the test with your doctor. · Abdominal aortic aneurysm. Ask your doctor whether you should have a test to check for an aneurysm. You may need a test if you ever smoked or if your parent, brother, sister, or child has had an aneurysm. When should you call for help? Watch closely for changes in your health, and be sure to contact your doctor if you have any problems or symptoms that concern you. Where can you learn more? Go to https://Transcepta.[employer]. org and sign in to your BlackJet account. Enter K329 in the Botanical Tans box to learn more about \"Well Visit, Over 65: Care Instructions. \"     If you do not have an account, please click on the \"Sign Up Now\" link. Current as of: May 16, 2017  Content Version: 11.6  © 7299-3833 [employer]. Care instructions adapted under license by [facility]"[employer] [facility] ([facility]). If you have questions about a medical condition or this instruction, always ask your healthcare professional. [place] [address] any warranty or liability for your use of this information. Patient Education        High Blood Pressure: Care Instructions  Your Care Instructions    If your blood pressure is usually above 130/80, you have high blood pressure, or hypertension. That means the top number is 130 or higher or the bottom number is 80 or higher, or both. Despite what a lot of people think, high blood pressure usually doesn't cause headaches or make you feel dizzy or lightheaded. It usually has no symptoms. But it does increase your risk for heart attack, stroke, and kidney or eye damage. The higher your blood pressure, the more your risk increases. Your doctor will give you a goal for your blood pressure. Your goal will be based on your health and your age. Lifestyle changes, such as eating healthy and being active, are always important to help lower blood pressure.  You might also take medicine to reach your blood pressure goal.  Follow-up care is a key part of your treatment and safety. Be sure to make and go to all appointments, and call your doctor if you are having problems. It's also a good idea to know your test results and keep a list of the medicines you take. How can you care for yourself at home? Medical treatment  · If you stop taking your medicine, your blood pressure will go back up. You may take one or more types of medicine to lower your blood pressure. Be safe with medicines. Take your medicine exactly as prescribed. Call your doctor if you think you are having a problem with your medicine. · Talk to your doctor before you start taking aspirin every day. Aspirin can help certain people lower their risk of a heart attack or stroke. But taking aspirin isn't right for everyone, because it can cause serious bleeding. · See your doctor regularly. You may need to see the doctor more often at first or until your blood pressure comes down. · If you are taking blood pressure medicine, talk to your doctor before you take decongestants or anti-inflammatory medicine, such as ibuprofen. Some of these medicines can raise blood pressure. · Learn how to check your blood pressure at home. Lifestyle changes  · Stay at a healthy weight. This is especially important if you put on weight around the waist. Losing even 10 pounds can help you lower your blood pressure. · If your doctor recommends it, get more exercise. Walking is a good choice. Bit by bit, increase the amount you walk every day. Try for at least 30 minutes on most days of the week. You also may want to swim, bike, or do other activities. · Avoid or limit alcohol. Talk to your doctor about whether you can drink any alcohol. · Try to limit how much sodium you eat to less than 2,300 milligrams (mg) a day. Your doctor may ask you to try to eat less than 1,500 mg a day.   · Eat plenty of fruits (such as bananas and oranges), and what treatment is best for you. A heart-healthy lifestyle along with medicines can help lower your cholesterol and your risk. The way you choose to lower your risk will depend on how high your risk is for heart attack and stroke. It will also depend on how you feel about taking medicines. Follow-up care is a key part of your treatment and safety. Be sure to make and go to all appointments, and call your doctor if you are having problems. It's also a good idea to know your test results and keep a list of the medicines you take. How can you care for yourself at home? · Eat a variety of foods every day. Good choices include fruits, vegetables, whole grains (like oatmeal), dried beans and peas, nuts and seeds, soy products (like tofu), and fat-free or low-fat dairy products. · Replace butter, margarine, and hydrogenated or partially hydrogenated oils with olive and canola oils. (Canola oil margarine without trans fat is fine.)  · Replace red meat with fish, poultry, and soy protein (like tofu). · Limit processed and packaged foods like chips, crackers, and cookies. · Bake, broil, or steam foods. Don't sevilla them. · Be physically active. Get at least 30 minutes of exercise on most days of the week. Walking is a good choice. You also may want to do other activities, such as running, swimming, cycling, or playing tennis or team sports. · Stay at a healthy weight or lose weight by making the changes in eating and physical activity listed above. Losing just a small amount of weight, even 5 to 10 pounds, can reduce your risk for having a heart attack or stroke. · Do not smoke. When should you call for help? Watch closely for changes in your health, and be sure to contact your doctor if:    · You need help making lifestyle changes.     · You have questions about your medicine. Where can you learn more? Go to https://joselin.Gnarus Systems. org and sign in to your Phone.com account.  Enter Q474 in the Search Health Information box to learn more about \"High Cholesterol: Care Instructions. \"     If you do not have an account, please click on the \"Sign Up Now\" link. Current as of: May 10, 2017  Content Version: 11.6  © 1628-2922 [employer]. Care instructions adapted under license by [facility] ([facility]). If you have questions about a medical condition or this instruction, always ask your healthcare professional. [address] any warranty or liability for your use of this information. Patient Education        DASH Diet: Care Instructions  Your Care Instructions    The DASH diet is an eating plan that can help lower your blood pressure. DASH stands for Dietary Approaches to Stop Hypertension. Hypertension is high blood pressure. The DASH diet focuses on eating foods that are high in calcium, potassium, and magnesium. These nutrients can lower blood pressure. The foods that are highest in these nutrients are fruits, vegetables, low-fat dairy products, nuts, seeds, and legumes. But taking calcium, potassium, and magnesium supplements instead of eating foods that are high in those nutrients does not have the same effect. The DASH diet also includes whole grains, fish, and poultry. The DASH diet is one of several lifestyle changes your doctor may recommend to lower your high blood pressure. Your doctor may also want you to decrease the amount of sodium in your diet. Lowering sodium while following the DASH diet can lower blood pressure even further than just the DASH diet alone. Follow-up care is a key part of your treatment and safety. Be sure to make and go to all appointments, and call your doctor if you are having problems. It's also a good idea to know your test results and keep a list of the medicines you take. How can you care for yourself at home? Following the DASH diet  · Eat 4 to 5 servings of fruit each day.  A serving is 1 medium-sized piece of fruit, ½ cup chopped or canned

## 2018-07-24 NOTE — PROGRESS NOTES
is no tenderness. There is no rebound and no guarding. Musculoskeletal: Normal range of motion. She exhibits no edema. No open areas on the feet. Sensation intact. Lymphadenopathy:     She has no cervical adenopathy. Neurological: She is alert and oriented to person, place, and time. She has normal reflexes. No cranial nerve deficit. Skin: Skin is warm and dry. No rash noted. Psychiatric: She has a normal mood and affect. Nursing note and vitals reviewed.     Health Maintenance   Topic Date Due    DTaP/Tdap/Td vaccine (1 - Tdap) 01/31/1965    Shingles Vaccine (1 of 2 - 2 Dose Series) 01/31/1996    Pneumococcal low/med risk (2 of 2 - PPSV23) 01/28/2016    A1C test (Diabetic or Prediabetic)  07/11/2018    Flu vaccine (1) 09/01/2018    Breast cancer screen  07/17/2019    Colon cancer screen colonoscopy  10/15/2020    Lipid screen  07/11/2022    DEXA (modify frequency per FRAX score)  Completed    Hepatitis C screen  Completed     Lab Results   Component Value Date    CHOL 203 (H) 07/11/2017     Lab Results   Component Value Date    TRIG 182 (H) 07/11/2017     Lab Results   Component Value Date    HDL 61 (H) 07/11/2017     Lab Results   Component Value Date    LDLCALC 106 07/11/2017     Lab Results   Component Value Date    LABVLDL 36 (H) 07/11/2017     Lab Results   Component Value Date    CHOLHDLRATIO 3.3 07/11/2017     Lab Results   Component Value Date     11/29/2017    K 3.7 11/29/2017     11/29/2017    CO2 29 11/29/2017    BUN 12 11/29/2017    CREATININE 0.7 11/29/2017    GLUCOSE 117 (H) 11/29/2017    CALCIUM 9.0 11/29/2017    PROT 6.7 11/29/2017    LABALBU 3.8 11/29/2017    BILITOT 0.3 11/29/2017    ALKPHOS 98 11/29/2017    AST 14 11/29/2017    ALT 10 (L) 11/29/2017    LABGLOM 82 (A) 11/29/2017     Lab Results   Component Value Date    LABA1C 5.8 07/11/2017     No results found for: EAG      Assessment:      Annual physical exam      Plan:      Refill meds  Fasting blood work  Encouraged diet, exercise and weight loss. Dash diet  Reviewed health maintenance  Mammogram  Pneumovax 21    Benita received counseling on the following healthy behaviors: nutrition, exercise and medication adherence    Patient given educational materials on Diabetes, Hyperlipidemia, Nutrition, Exercise and Hypertension    I have instructed [facility] to complete a self tracking handout on Blood Sugars , Blood Pressures  and Weights and instructed them to bring it with them to her next appointment. Discussed use, benefit, and side effects of prescribed medications. Barriers to medication compliance addressed. All patient questions answered. Pt voiced understanding.

## 2018-07-25 ENCOUNTER — TELEPHONE (OUTPATIENT)
Dept: FAMILY MEDICINE CLINIC | Age: 73
End: 2018-07-25

## 2018-07-25 DIAGNOSIS — E11.9 TYPE 2 DIABETES MELLITUS WITHOUT COMPLICATION, WITHOUT LONG-TERM CURRENT USE OF INSULIN (HCC): Primary | ICD-10-CM

## 2018-07-25 NOTE — TELEPHONE ENCOUNTER
Patient notified a1c is good at 6.0,   cholesterol at 187 with normal CRR, CBC is ok,  CMP is good, glucose at 119.   patient notified overall labs are good, recheck a1c in 6 months    Lab order mailed to pt

## 2018-08-22 ENCOUNTER — HOSPITAL ENCOUNTER (OUTPATIENT)
Dept: WOMENS IMAGING | Age: 73
Discharge: HOME | End: 2018-08-22
Payer: MEDICARE

## 2018-08-22 DIAGNOSIS — Z12.39 SCREENING FOR BREAST CANCER: ICD-10-CM

## 2018-08-22 DIAGNOSIS — Z00.00 ANNUAL PHYSICAL EXAM: ICD-10-CM

## 2018-08-22 PROCEDURE — 77067 SCR MAMMO BI INCL CAD: CPT

## 2018-08-23 ENCOUNTER — HOSPITAL ENCOUNTER (OUTPATIENT)
Dept: WOMENS IMAGING | Age: 73
Discharge: HOME | End: 2018-08-23
Payer: MEDICARE

## 2018-08-23 DIAGNOSIS — R92.2 BREAST DENSITY: ICD-10-CM

## 2018-08-23 PROCEDURE — 76642 ULTRASOUND BREAST LIMITED: CPT

## 2018-08-23 PROCEDURE — 77065 DX MAMMO INCL CAD UNI: CPT

## 2018-09-05 ENCOUNTER — OFFICE VISIT (OUTPATIENT)
Dept: FAMILY MEDICINE CLINIC | Age: 73
End: 2018-09-05
Payer: MEDICARE

## 2018-09-05 VITALS
RESPIRATION RATE: 12 BRPM | HEIGHT: 65 IN | HEART RATE: 72 BPM | TEMPERATURE: 98.4 F | WEIGHT: 216.2 LBS | BODY MASS INDEX: 36.02 KG/M2 | DIASTOLIC BLOOD PRESSURE: 62 MMHG | OXYGEN SATURATION: 95 % | SYSTOLIC BLOOD PRESSURE: 124 MMHG

## 2018-09-05 DIAGNOSIS — E11.9 TYPE 2 DIABETES MELLITUS WITHOUT COMPLICATION, WITHOUT LONG-TERM CURRENT USE OF INSULIN (HCC): ICD-10-CM

## 2018-09-05 DIAGNOSIS — J20.9 ACUTE BRONCHITIS, UNSPECIFIED ORGANISM: Primary | ICD-10-CM

## 2018-09-05 PROCEDURE — 99213 OFFICE O/P EST LOW 20 MIN: CPT | Performed by: FAMILY MEDICINE

## 2018-09-05 RX ORDER — GUAIFENESIN AND CODEINE PHOSPHATE 100; 10 MG/5ML; MG/5ML
5-10 SOLUTION ORAL 4 TIMES DAILY PRN
Qty: 120 ML | Refills: 0 | Status: SHIPPED | OUTPATIENT
Start: 2018-09-05 | End: 2018-09-12

## 2018-09-05 RX ORDER — DOXYCYCLINE HYCLATE 100 MG
100 TABLET ORAL 2 TIMES DAILY
Qty: 20 TABLET | Refills: 0 | Status: SHIPPED | OUTPATIENT
Start: 2018-09-05 | End: 2018-09-15

## 2018-09-05 ASSESSMENT — ENCOUNTER SYMPTOMS
CHEST TIGHTNESS: 0
EYE PAIN: 0
BLOOD IN STOOL: 0
WHEEZING: 1
VOMITING: 0
COUGH: 1
ABDOMINAL PAIN: 0
DIARRHEA: 0
SORE THROAT: 1
RHINORRHEA: 1
SHORTNESS OF BREATH: 1
BACK PAIN: 0
HEARTBURN: 0
HEMOPTYSIS: 0
NAUSEA: 0
CONSTIPATION: 0

## 2018-09-05 NOTE — PROGRESS NOTES
Subjective:      Patient ID: Cathy Esparza is a 68 y.o. female. Cough   The current episode started in the past 7 days (x 5 to 6 days). The problem has been waxing and waning. The cough is productive of sputum. Associated symptoms include ear congestion, a fever, headaches, postnasal drip, rhinorrhea, a sore throat, shortness of breath and wheezing. Pertinent negatives include no chest pain, chills, ear pain, heartburn, hemoptysis, myalgias, nasal congestion, rash, sweats or weight loss. Treatments tried: tylenol, mucinex. The treatment provided mild relief. Review of Systems   Constitutional: Positive for fever. Negative for chills, fatigue, unexpected weight change and weight loss. HENT: Positive for postnasal drip, rhinorrhea and sore throat. Negative for congestion and ear pain. Eyes: Negative for pain and visual disturbance. Respiratory: Positive for cough, shortness of breath and wheezing. Negative for hemoptysis and chest tightness. Cardiovascular: Negative for chest pain and palpitations. Gastrointestinal: Negative for abdominal pain, blood in stool, constipation, diarrhea, heartburn, nausea and vomiting. Genitourinary: Negative for difficulty urinating, frequency, hematuria and urgency. Musculoskeletal: Negative for back pain, joint swelling, myalgias and neck pain. Skin: Negative for rash. Neurological: Positive for headaches. Negative for dizziness. Hematological: Negative for adenopathy. Does not bruise/bleed easily. Psychiatric/Behavioral: Negative for behavioral problems and sleep disturbance. The patient is not nervous/anxious. Objective:   Physical Exam   Constitutional: She is oriented to person, place, and time. She appears well-developed and well-nourished. HENT:   Head: Normocephalic and atraumatic. Right Ear: External ear normal.   Left Ear: External ear normal.   Nose: Right sinus exhibits maxillary sinus tenderness.  Left sinus exhibits maxillary sinus tenderness. Mouth/Throat: Oropharynx is clear and moist.   Eyes: Pupils are equal, round, and reactive to light. EOM are normal.   Neck: Neck supple. No thyromegaly present. Cardiovascular: Normal rate, regular rhythm and normal heart sounds. Pulmonary/Chest: She has wheezes. She has rhonchi. She has no rales. Abdominal: Soft. Bowel sounds are normal. There is no tenderness. There is no rebound and no guarding. Musculoskeletal: Normal range of motion. She exhibits no edema. Lymphadenopathy:     She has cervical adenopathy. Right cervical: Superficial cervical adenopathy present. Left cervical: Superficial cervical adenopathy present. Neurological: She is alert and oriented to person, place, and time. She has normal reflexes. No cranial nerve deficit. Skin: Skin is warm and dry. No rash noted. Psychiatric: She has a normal mood and affect. Nursing note and vitals reviewed.       Assessment:      Acute bronchitis      Plan:      Doxycycline 100 mg BID x 10 days  Cough med          Shaun Beach MD

## 2018-09-29 ENCOUNTER — CARE COORDINATION (OUTPATIENT)
Dept: CASE MANAGEMENT | Age: 73
End: 2018-09-29

## 2018-11-06 DIAGNOSIS — F41.9 ANXIETY: ICD-10-CM

## 2018-11-06 RX ORDER — LORAZEPAM 0.5 MG/1
0.5 TABLET ORAL EVERY 6 HOURS PRN
Qty: 60 TABLET | Refills: 2 | Status: SHIPPED | OUTPATIENT
Start: 2018-11-06 | End: 2019-02-13 | Stop reason: SDUPTHER

## 2018-11-06 NOTE — TELEPHONE ENCOUNTER
Deep Lehman needs refill of   Requested Prescriptions     Pending Prescriptions Disp Refills    LORazepam (ATIVAN) 0.5 MG tablet 60 tablet 2     Sig: Take 1 tablet by mouth every 6 hours as needed for Anxiety for up to 30 days. F41.9. Last Filled on:  7/24/2018 #60/2.     Last Visit Date:  9/5/2018    Next Visit Date:    Visit date not found

## 2018-12-03 ENCOUNTER — HOSPITAL ENCOUNTER (OUTPATIENT)
Dept: CT IMAGING | Age: 73
Discharge: HOME | End: 2018-12-03
Payer: MEDICARE

## 2018-12-03 ENCOUNTER — HOSPITAL ENCOUNTER (OUTPATIENT)
Dept: GENERAL RADIOLOGY | Age: 73
Discharge: HOME | End: 2018-12-03
Payer: MEDICARE

## 2018-12-03 ENCOUNTER — HOSPITAL ENCOUNTER (OUTPATIENT)
Age: 73
Discharge: HOME | End: 2018-12-03
Payer: MEDICARE

## 2018-12-03 DIAGNOSIS — C64.2 RENAL CELL CARCINOMA OF LEFT KIDNEY (HCC): ICD-10-CM

## 2018-12-03 LAB
ANION GAP SERPL CALCULATED.3IONS-SCNC: 13 MEQ/L (ref 8–16)
BASOPHILS # BLD: 0.6 %
BASOPHILS ABSOLUTE: 0.1 THOU/MM3 (ref 0–0.1)
BUN BLDV-MCNC: 15 MG/DL (ref 7–22)
CALCIUM SERPL-MCNC: 9.1 MG/DL (ref 8.5–10.5)
CHLORIDE BLD-SCNC: 99 MEQ/L (ref 98–111)
CO2: 29 MEQ/L (ref 23–33)
CREAT SERPL-MCNC: 0.9 MG/DL (ref 0.4–1.2)
EOSINOPHIL # BLD: 2.2 %
EOSINOPHILS ABSOLUTE: 0.2 THOU/MM3 (ref 0–0.4)
ERYTHROCYTE [DISTWIDTH] IN BLOOD BY AUTOMATED COUNT: 15.1 % (ref 11.5–14.5)
ERYTHROCYTE [DISTWIDTH] IN BLOOD BY AUTOMATED COUNT: 49.7 FL (ref 35–45)
GFR SERPL CREATININE-BSD FRML MDRD: 61 ML/MIN/1.73M2
GLUCOSE BLD-MCNC: 125 MG/DL (ref 70–108)
HCT VFR BLD CALC: 40.6 % (ref 37–47)
HEMOGLOBIN: 12.9 GM/DL (ref 12–16)
IMMATURE GRANS (ABS): 0.02 THOU/MM3 (ref 0–0.07)
IMMATURE GRANULOCYTES: 0.2 %
LD: 158 U/L (ref 100–190)
LYMPHOCYTES # BLD: 30.9 %
LYMPHOCYTES ABSOLUTE: 3.4 THOU/MM3 (ref 1–4.8)
MCH RBC QN AUTO: 28.5 PG (ref 26–33)
MCHC RBC AUTO-ENTMCNC: 31.8 GM/DL (ref 32.2–35.5)
MCV RBC AUTO: 89.8 FL (ref 81–99)
MONOCYTES # BLD: 8.2 %
MONOCYTES ABSOLUTE: 0.9 THOU/MM3 (ref 0.4–1.3)
NUCLEATED RED BLOOD CELLS: 0 /100 WBC
PLATELET # BLD: 309 THOU/MM3 (ref 130–400)
PMV BLD AUTO: 10.7 FL (ref 9.4–12.4)
POC CREATININE WHOLE BLOOD: 0.9 MG/DL (ref 0.5–1.2)
POTASSIUM SERPL-SCNC: 3.4 MEQ/L (ref 3.5–5.2)
RBC # BLD: 4.52 MILL/MM3 (ref 4.2–5.4)
SEG NEUTROPHILS: 57.9 %
SEGMENTED NEUTROPHILS ABSOLUTE COUNT: 6.4 THOU/MM3 (ref 1.8–7.7)
SODIUM BLD-SCNC: 141 MEQ/L (ref 135–145)
WBC # BLD: 11 THOU/MM3 (ref 4.8–10.8)

## 2018-12-03 PROCEDURE — 83615 LACTATE (LD) (LDH) ENZYME: CPT

## 2018-12-03 PROCEDURE — 74178 CT ABD&PLV WO CNTR FLWD CNTR: CPT

## 2018-12-03 PROCEDURE — 80048 BASIC METABOLIC PNL TOTAL CA: CPT

## 2018-12-03 PROCEDURE — 71046 X-RAY EXAM CHEST 2 VIEWS: CPT

## 2018-12-03 PROCEDURE — 6360000004 HC RX CONTRAST MEDICATION: Performed by: PHYSICIAN ASSISTANT

## 2018-12-03 PROCEDURE — 82565 ASSAY OF CREATININE: CPT

## 2018-12-03 PROCEDURE — 85025 COMPLETE CBC W/AUTO DIFF WBC: CPT

## 2018-12-03 PROCEDURE — 36415 COLL VENOUS BLD VENIPUNCTURE: CPT

## 2018-12-03 RX ADMIN — IOPAMIDOL 85 ML: 755 INJECTION, SOLUTION INTRAVENOUS at 08:45

## 2018-12-13 ENCOUNTER — OFFICE VISIT (OUTPATIENT)
Dept: UROLOGY | Age: 73
End: 2018-12-13
Payer: MEDICARE

## 2018-12-13 VITALS
WEIGHT: 218 LBS | HEIGHT: 65 IN | SYSTOLIC BLOOD PRESSURE: 118 MMHG | DIASTOLIC BLOOD PRESSURE: 70 MMHG | BODY MASS INDEX: 36.32 KG/M2

## 2018-12-13 DIAGNOSIS — R39.15 URINARY URGENCY: ICD-10-CM

## 2018-12-13 DIAGNOSIS — C64.2 RENAL CELL CARCINOMA OF LEFT KIDNEY (HCC): Primary | ICD-10-CM

## 2018-12-13 DIAGNOSIS — E87.6 HYPOKALEMIA: ICD-10-CM

## 2018-12-13 LAB
BACTERIA: ABNORMAL /HPF
BILIRUBIN URINE: NEGATIVE
BILIRUBIN URINE: NEGATIVE
BLOOD URINE, POC: ABNORMAL
BLOOD, URINE: ABNORMAL
CASTS UA: ABNORMAL /LPF
CHARACTER, URINE: ABNORMAL
CHARACTER, URINE: CLEAR
COLOR, URINE: YELLOW
COLOR: YELLOW
CRYSTALS, UA: ABNORMAL
EPITHELIAL CELLS, UA: ABNORMAL /HPF
GLUCOSE URINE: NEGATIVE MG/DL
GLUCOSE URINE: NEGATIVE MG/DL
KETONES, URINE: NEGATIVE
KETONES, URINE: NEGATIVE
LEUKOCYTE CLUMPS, URINE: ABNORMAL
LEUKOCYTE ESTERASE, URINE: ABNORMAL
NITRITE, URINE: NEGATIVE
NITRITE, URINE: NEGATIVE
PH UA: 6
PH, URINE: 6
PROTEIN UA: NEGATIVE
PROTEIN, URINE: ABNORMAL MG/DL
RBC URINE: ABNORMAL /HPF
SPECIFIC GRAVITY, URINE: 1.02 (ref 1–1.03)
SPECIFIC GRAVITY, URINE: 1.03 (ref 1–1.03)
UROBILINOGEN, URINE: 0.2 EU/DL
UROBILINOGEN, URINE: 0.2 EU/DL
WBC UA: ABNORMAL /HPF
YEAST: ABNORMAL

## 2018-12-13 PROCEDURE — 99213 OFFICE O/P EST LOW 20 MIN: CPT | Performed by: PHYSICIAN ASSISTANT

## 2018-12-13 PROCEDURE — 81003 URINALYSIS AUTO W/O SCOPE: CPT | Performed by: PHYSICIAN ASSISTANT

## 2018-12-14 LAB — URINE CULTURE REFLEX: NORMAL

## 2018-12-18 ENCOUNTER — TELEPHONE (OUTPATIENT)
Dept: UROLOGY | Age: 73
End: 2018-12-18

## 2018-12-18 DIAGNOSIS — R31.29 MICROSCOPIC HEMATURIA: Primary | ICD-10-CM

## 2018-12-27 ENCOUNTER — NURSE ONLY (OUTPATIENT)
Dept: UROLOGY | Age: 73
End: 2018-12-27

## 2018-12-27 ENCOUNTER — HOSPITAL ENCOUNTER (OUTPATIENT)
Age: 73
Setting detail: SPECIMEN
Discharge: HOME | End: 2018-12-27
Payer: MEDICARE

## 2018-12-27 DIAGNOSIS — R31.29 MICROSCOPIC HEMATURIA: Primary | ICD-10-CM

## 2018-12-27 PROCEDURE — 88112 CYTOPATH CELL ENHANCE TECH: CPT

## 2018-12-27 PROCEDURE — 99999 PR OFFICE/OUTPT VISIT,PROCEDURE ONLY: CPT | Performed by: NURSE PRACTITIONER

## 2019-01-21 ENCOUNTER — TELEPHONE (OUTPATIENT)
Dept: UROLOGY | Age: 74
End: 2019-01-21

## 2019-01-24 ENCOUNTER — PROCEDURE VISIT (OUTPATIENT)
Dept: UROLOGY | Age: 74
End: 2019-01-24
Payer: MEDICARE

## 2019-01-24 VITALS
HEIGHT: 65 IN | DIASTOLIC BLOOD PRESSURE: 64 MMHG | WEIGHT: 219 LBS | BODY MASS INDEX: 36.49 KG/M2 | SYSTOLIC BLOOD PRESSURE: 100 MMHG

## 2019-01-24 DIAGNOSIS — C64.2 RENAL CELL CARCINOMA OF LEFT KIDNEY (HCC): Primary | ICD-10-CM

## 2019-01-24 LAB
BILIRUBIN URINE: NEGATIVE
BLOOD URINE, POC: ABNORMAL
CHARACTER, URINE: CLEAR
COLOR, URINE: YELLOW
GLUCOSE URINE: NEGATIVE MG/DL
KETONES, URINE: NEGATIVE
LEUKOCYTE CLUMPS, URINE: ABNORMAL
NITRITE, URINE: NEGATIVE
PH, URINE: 7
PROTEIN, URINE: NEGATIVE MG/DL
SPECIFIC GRAVITY, URINE: 1.02 (ref 1–1.03)
UROBILINOGEN, URINE: 0.2 EU/DL

## 2019-01-24 PROCEDURE — 99999 PR OFFICE/OUTPT VISIT,PROCEDURE ONLY: CPT | Performed by: UROLOGY

## 2019-01-24 PROCEDURE — 52000 CYSTOURETHROSCOPY: CPT | Performed by: UROLOGY

## 2019-01-24 PROCEDURE — 81003 URINALYSIS AUTO W/O SCOPE: CPT | Performed by: UROLOGY

## 2019-01-24 RX ORDER — FLUCONAZOLE 100 MG/1
100 TABLET ORAL DAILY
Qty: 3 TABLET | Refills: 0 | Status: SHIPPED | OUTPATIENT
Start: 2019-01-24 | End: 2019-01-27

## 2019-01-26 LAB
ANION GAP SERPL CALCULATED.3IONS-SCNC: 11 MEQ/L (ref 10–19)
AVERAGE GLUCOSE: 128 MG/DL (ref 66–114)
BUN BLDV-MCNC: 12 MG/DL (ref 8–23)
CALCIUM SERPL-MCNC: 9.2 MG/DL (ref 8.5–10.5)
CHLORIDE BLD-SCNC: 99 MEQ/L (ref 95–107)
CO2: 30 MEQ/L (ref 19–31)
CREAT SERPL-MCNC: 0.9 MG/DL (ref 0.6–1.3)
EGFR AFRICAN AMERICAN: 74 ML/MIN/1.73 M2
EGFR IF NONAFRICAN AMERICAN: 63.9 ML/MIN/1.73 M2
GLUCOSE: 104 MG/DL (ref 70–99)
HBA1C MFR BLD: 6.1 %
POTASSIUM SERPL-SCNC: 3.6 MEQ/L (ref 3.5–5.4)
SODIUM BLD-SCNC: 140 MEQ/L (ref 135–146)

## 2019-01-27 ENCOUNTER — TELEPHONE (OUTPATIENT)
Dept: UROLOGY | Age: 74
End: 2019-01-27

## 2019-01-28 ENCOUNTER — TELEPHONE (OUTPATIENT)
Dept: FAMILY MEDICINE CLINIC | Age: 74
End: 2019-01-28

## 2019-02-12 ENCOUNTER — HOSPITAL ENCOUNTER (OUTPATIENT)
Dept: WOMENS IMAGING | Age: 74
Discharge: HOME | End: 2019-02-12
Payer: MEDICARE

## 2019-02-12 DIAGNOSIS — N60.01 CYST OF RIGHT BREAST: ICD-10-CM

## 2019-02-12 DIAGNOSIS — R92.2 BREAST DENSITY: ICD-10-CM

## 2019-02-12 DIAGNOSIS — Z09 FOLLOW-UP EXAM, 3-6 MONTHS SINCE PREVIOUS EXAM: ICD-10-CM

## 2019-02-12 PROCEDURE — 76642 ULTRASOUND BREAST LIMITED: CPT

## 2019-02-13 DIAGNOSIS — F41.9 ANXIETY: ICD-10-CM

## 2019-02-13 RX ORDER — LORAZEPAM 0.5 MG/1
0.5 TABLET ORAL EVERY 12 HOURS PRN
Qty: 60 TABLET | Refills: 0 | Status: SHIPPED | OUTPATIENT
Start: 2019-02-13 | End: 2019-03-19 | Stop reason: SDUPTHER

## 2019-03-19 DIAGNOSIS — F41.9 ANXIETY: ICD-10-CM

## 2019-03-19 RX ORDER — LORAZEPAM 0.5 MG/1
0.5 TABLET ORAL EVERY 12 HOURS PRN
Qty: 60 TABLET | Refills: 0 | Status: SHIPPED | OUTPATIENT
Start: 2019-03-19 | End: 2019-04-17 | Stop reason: SDUPTHER

## 2019-03-21 ENCOUNTER — OFFICE VISIT (OUTPATIENT)
Dept: FAMILY MEDICINE CLINIC | Age: 74
End: 2019-03-21
Payer: MEDICARE

## 2019-03-21 VITALS
RESPIRATION RATE: 16 BRPM | DIASTOLIC BLOOD PRESSURE: 82 MMHG | BODY MASS INDEX: 37.32 KG/M2 | TEMPERATURE: 97.6 F | WEIGHT: 224 LBS | HEIGHT: 65 IN | HEART RATE: 68 BPM | SYSTOLIC BLOOD PRESSURE: 132 MMHG

## 2019-03-21 DIAGNOSIS — F41.9 ANXIETY: ICD-10-CM

## 2019-03-21 DIAGNOSIS — Z13.31 POSITIVE DEPRESSION SCREENING: ICD-10-CM

## 2019-03-21 DIAGNOSIS — M25.471 RIGHT ANKLE SWELLING: ICD-10-CM

## 2019-03-21 DIAGNOSIS — F32.9 REACTIVE DEPRESSION: ICD-10-CM

## 2019-03-21 DIAGNOSIS — K21.9 GASTROESOPHAGEAL REFLUX DISEASE, ESOPHAGITIS PRESENCE NOT SPECIFIED: Primary | ICD-10-CM

## 2019-03-21 PROCEDURE — G8431 POS CLIN DEPRES SCRN F/U DOC: HCPCS | Performed by: FAMILY MEDICINE

## 2019-03-21 PROCEDURE — G0444 DEPRESSION SCREEN ANNUAL: HCPCS | Performed by: FAMILY MEDICINE

## 2019-03-21 PROCEDURE — 99214 OFFICE O/P EST MOD 30 MIN: CPT | Performed by: FAMILY MEDICINE

## 2019-03-21 RX ORDER — OMEPRAZOLE 40 MG/1
40 CAPSULE, DELAYED RELEASE ORAL DAILY
Qty: 30 CAPSULE | Refills: 0 | Status: SHIPPED | OUTPATIENT
Start: 2019-03-21 | End: 2019-04-12 | Stop reason: SDUPTHER

## 2019-03-21 ASSESSMENT — PATIENT HEALTH QUESTIONNAIRE - PHQ9
2. FEELING DOWN, DEPRESSED OR HOPELESS: 2
6. FEELING BAD ABOUT YOURSELF - OR THAT YOU ARE A FAILURE OR HAVE LET YOURSELF OR YOUR FAMILY DOWN: 0
10. IF YOU CHECKED OFF ANY PROBLEMS, HOW DIFFICULT HAVE THESE PROBLEMS MADE IT FOR YOU TO DO YOUR WORK, TAKE CARE OF THINGS AT HOME, OR GET ALONG WITH OTHER PEOPLE: 1
SUM OF ALL RESPONSES TO PHQ9 QUESTIONS 1 & 2: 3
5. POOR APPETITE OR OVEREATING: 3
7. TROUBLE CONCENTRATING ON THINGS, SUCH AS READING THE NEWSPAPER OR WATCHING TELEVISION: 1
4. FEELING TIRED OR HAVING LITTLE ENERGY: 3
9. THOUGHTS THAT YOU WOULD BE BETTER OFF DEAD, OR OF HURTING YOURSELF: 0
8. MOVING OR SPEAKING SO SLOWLY THAT OTHER PEOPLE COULD HAVE NOTICED. OR THE OPPOSITE, BEING SO FIGETY OR RESTLESS THAT YOU HAVE BEEN MOVING AROUND A LOT MORE THAN USUAL: 0
3. TROUBLE FALLING OR STAYING ASLEEP: 3
1. LITTLE INTEREST OR PLEASURE IN DOING THINGS: 1
SUM OF ALL RESPONSES TO PHQ QUESTIONS 1-9: 13
SUM OF ALL RESPONSES TO PHQ QUESTIONS 1-9: 13

## 2019-03-21 ASSESSMENT — ENCOUNTER SYMPTOMS
RHINORRHEA: 0
BACK PAIN: 0
CONSTIPATION: 0
SHORTNESS OF BREATH: 0
VOMITING: 0
SORE THROAT: 0
BLOOD IN STOOL: 0
DIARRHEA: 0
WHEEZING: 0
EYE PAIN: 0
COUGH: 0
CHEST TIGHTNESS: 0
ABDOMINAL PAIN: 1
NAUSEA: 0

## 2019-04-01 RX ORDER — DEXLANSOPRAZOLE 60 MG/1
60 CAPSULE, DELAYED RELEASE ORAL DAILY PRN
Qty: 30 CAPSULE | Refills: 3 | OUTPATIENT
Start: 2019-04-01

## 2019-04-01 NOTE — TELEPHONE ENCOUNTER
T/C Jayson Omer - states we sent in the paperwork for her Dexilant to [facility] but she got a letter than they need a prescription faxed to them. Not sure if it can be sent electronically or if has to be faxed. I could not find a pharmacy to choose.

## 2019-04-02 ENCOUNTER — OFFICE VISIT (OUTPATIENT)
Dept: FAMILY MEDICINE CLINIC | Age: 74
End: 2019-04-02
Payer: MEDICARE

## 2019-04-02 VITALS
BODY MASS INDEX: 37.64 KG/M2 | HEART RATE: 62 BPM | DIASTOLIC BLOOD PRESSURE: 66 MMHG | WEIGHT: 226.2 LBS | SYSTOLIC BLOOD PRESSURE: 112 MMHG | RESPIRATION RATE: 20 BRPM

## 2019-04-02 DIAGNOSIS — R60.9 PERIPHERAL EDEMA: Primary | ICD-10-CM

## 2019-04-02 DIAGNOSIS — F51.01 PRIMARY INSOMNIA: ICD-10-CM

## 2019-04-02 DIAGNOSIS — E11.9 TYPE 2 DIABETES MELLITUS WITHOUT COMPLICATION, WITHOUT LONG-TERM CURRENT USE OF INSULIN (HCC): ICD-10-CM

## 2019-04-02 PROCEDURE — 99214 OFFICE O/P EST MOD 30 MIN: CPT | Performed by: FAMILY MEDICINE

## 2019-04-02 RX ORDER — DOXEPIN HYDROCHLORIDE 10 MG/1
10 CAPSULE ORAL NIGHTLY
Qty: 30 CAPSULE | Refills: 3 | Status: SHIPPED | OUTPATIENT
Start: 2019-04-02 | End: 2019-07-09 | Stop reason: SDUPTHER

## 2019-04-02 RX ORDER — BUMETANIDE 0.5 MG/1
0.5 TABLET ORAL DAILY
Qty: 15 TABLET | Refills: 0 | Status: SHIPPED | OUTPATIENT
Start: 2019-04-02 | End: 2019-04-16 | Stop reason: SDUPTHER

## 2019-04-02 ASSESSMENT — ENCOUNTER SYMPTOMS
WHEEZING: 0
CONSTIPATION: 0
BLOOD IN STOOL: 0
COUGH: 0
VOMITING: 0
BACK PAIN: 0
SORE THROAT: 0
EYE PAIN: 0
CHEST TIGHTNESS: 0
NAUSEA: 0
RHINORRHEA: 0
ABDOMINAL PAIN: 0
DIARRHEA: 0
SHORTNESS OF BREATH: 1

## 2019-04-02 NOTE — PATIENT INSTRUCTIONS
Patient Education        Type 2 Diabetes: Care Instructions  Your Care Instructions    Type 2 diabetes is a disease that develops when the body's tissues cannot use insulin properly. Over time, the pancreas cannot make enough insulin. Insulin is a hormone that helps the body's cells use sugar (glucose) for energy. It also helps the body store extra sugar in muscle, fat, and liver cells. Without insulin, the sugar cannot get into the cells to do its work. It stays in the blood instead. This can cause high blood sugar levels. A person has diabetes when the blood sugar stays too high too much of the time. Over time, diabetes can lead to diseases of the heart, blood vessels, nerves, kidneys, and eyes. You may be able to control your blood sugar by losing weight, eating a healthy diet, and getting daily exercise. You may also have to take insulin or other diabetes medicine. Follow-up care is a key part of your treatment and safety. Be sure to make and go to all appointments. Call your doctor if you are having problems. It's also a good idea to know your test results and keep a list of the medicines you take. How can you care for yourself at home? · Keep your blood sugar at a target level (which you set with your doctor). ? Eat a good diet that spreads carbohydrate throughout the day. Carbohydrate--the body's main source of fuel--affects blood sugar more than any other nutrient. Carbohydrate is in fruits, vegetables, milk, and yogurt. It also is in breads, cereals, vegetables such as potatoes and corn, and sugary foods such as candy and cakes. ? Aim for 30 minutes of exercise on most, preferably all, days of the week. Walking is a good choice. You also may want to do other activities, such as running, swimming, cycling, or playing tennis or team sports. If your doctor says it's okay, do muscle-strengthening exercises at least 2 times a week. ? Take your medicines exactly as prescribed.  Call your doctor if you think you are having a problem with your medicine. You will get more details on the specific medicines your doctor prescribes. · Check your blood sugar as often as your doctor recommends. It is important to keep track of any symptoms you have, such as low blood sugar. Also tell your doctor if you have any changes in your activities, diet, or insulin use. · Talk to your doctor before you start taking aspirin every day. Aspirin can help certain people lower their risk of a heart attack or stroke. But taking aspirin isn't right for everyone, because it can cause serious bleeding. · Do not smoke. If you need help quitting, talk to your doctor about stop-smoking programs and medicines. These can increase your chances of quitting for good. · Keep your cholesterol and blood pressure at normal levels. You may need to take one or more medicines to reach your goals. Take them exactly as directed. Do not stop or change a medicine without talking to your doctor first.  When should you call for help? Call 911 anytime you think you may need emergency care. For example, call if:    · You passed out (lost consciousness), or you suddenly become very sleepy or confused. (You may have very low blood sugar.)    Call your doctor now or seek immediate medical care if:    · Your blood sugar is 300 mg/dL or is higher than the level your doctor has set for you.     · You have symptoms of low blood sugar, such as:  ? Sweating. ? Feeling nervous, shaky, and weak. ? Extreme hunger and slight nausea. ? Dizziness and headache.  ? Blurred vision. ? Confusion.    Watch closely for changes in your health, and be sure to contact your doctor if:    · You often have problems controlling your blood sugar.     · You have symptoms of long-term diabetes problems, such as:  ? New vision changes. ? New pain, numbness, or tingling in your hands or feet. ? Skin problems. Where can you learn more? Go to https://chkrisseb.health-[email or web address] org and sign in to your GetApp account. Enter C553 in the [facility] box to learn more about \"Type 2 Diabetes: Care Instructions. \"     If you do not have an account, please click on the \"Sign Up Now\" link. Current as of: July 25, 2018  Content Version: 11.9  © 9488-6804 [employer]. Care instructions adapted under license by [facility] ([facility]). If you have questions about a medical condition or this instruction, always ask your healthcare professional. [place] [address] any warranty or liability for your use of this information. Patient Education        Leg and Ankle Edema: Care Instructions  Your Care Instructions  Swelling in the legs, ankles, and feet is called edema. It is common after you sit or stand for a while. Long plane flights or car rides often cause swelling in the legs and feet. You may also have swelling if you have to stand for long periods of time at your job. Problems with the veins in the legs (varicose veins) and changes in hormones can also cause swelling. Sometimes the swelling in the ankles and feet is caused by a more serious problem, such as heart failure, infection, blood clots, or liver or kidney disease. Follow-up care is a key part of your treatment and safety. Be sure to make and go to all appointments, and call your doctor if you are having problems. It's also a good idea to know your test results and keep a list of the medicines you take. How can you care for yourself at home? · If your doctor gave you medicine, take it as prescribed. Call your doctor if you think you are having a problem with your medicine. · Whenever you are resting, raise your legs up. Try to keep the swollen area higher than the level of your heart. · Take breaks from standing or sitting in one position. ? Walk around to increase the blood flow in your lower legs. ? Move your feet and ankles often while you stand, or tighten and relax your leg muscles.   · Wear support stockings. Put them on in the morning, before swelling gets worse. · Eat a balanced diet. Lose weight if you need to. · Limit the amount of salt (sodium) in your diet. Salt holds fluid in the body and may increase swelling. When should you call for help? Call 911 anytime you think you may need emergency care. For example, call if:    · You have symptoms of a blood clot in your lung (called a pulmonary embolism). These may include:  ? Sudden chest pain. ? Trouble breathing. ? Coughing up blood.    Call your doctor now or seek immediate medical care if:    · You have signs of a blood clot, such as:  ? Pain in your calf, back of the knee, thigh, or groin. ? Redness and swelling in your leg or groin.     · You have symptoms of infection, such as:  ? Increased pain, swelling, warmth, or redness. ? Red streaks or pus. ? A fever.    Watch closely for changes in your health, and be sure to contact your doctor if:    · Your swelling is getting worse.     · You have new or worsening pain in your legs.     · You do not get better as expected. Where can you learn more? Go to https://Faves.Technical Machine. org and sign in to your "[employer]" account. Enter R271 in the [employer] box to learn more about \"Leg and Ankle Edema: Care Instructions. \"     If you do not have an account, please click on the \"Sign Up Now\" link. Current as of: September 23, 2018  Content Version: 11.9  © 3283-3448 Healthwise, Incorporated. Care instructions adapted under license by [facility] ([facility]). If you have questions about a medical condition or this instruction, always ask your healthcare professional. [place] [address] any warranty or liability for your use of this information. Patient Education        Learning About Sleeping Well  What does sleeping well mean? Sleeping well means getting enough sleep. How much sleep is enough varies among people.   The number of hours you sleep is not as important as how you feel when you wake up. If you do not feel refreshed, you probably need more sleep. Another sign of not getting enough sleep is feeling tired during the day. The average total nightly sleep time is 7½ to 8 hours. Healthy adults may need a little more or a little less than this. Why is getting enough sleep important? Getting enough quality sleep is a basic part of good health. When your sleep suffers, your mood and your thoughts can suffer too. You may find yourself feeling more grumpy or stressed. Not getting enough sleep also can lead to serious problems, including injury, accidents, anxiety, and depression. What might cause poor sleeping? Many things can cause sleep problems, including:  · Stress. Stress can be caused by fear about a single event, such as giving a speech. Or you may have ongoing stress, such as worry about work or school. · Depression, anxiety, and other mental or emotional conditions. · Changes in your sleep habits or surroundings. This includes changes that happen where you sleep, such as noise, light, or sleeping in a different bed. It also includes changes in your sleep pattern, such as having jet lag or working a late shift. · Health problems, such as pain, breathing problems, and restless legs syndrome. · Lack of regular exercise. How can you help yourself? Here are some tips that may help you sleep more soundly and wake up feeling more refreshed. Your sleeping area  · Use your bedroom only for sleeping and sex. A bit of light reading may help you fall asleep. But if it doesn't, do your reading elsewhere in the house. Don't watch TV in bed. · Be sure your bed is big enough to stretch out comfortably, especially if you have a sleep partner. · Keep your bedroom quiet, dark, and cool. Use curtains, blinds, or a sleep mask to block out light. To block out noise, use earplugs, soothing music, or a \"white noise\" machine.   Your evening and bedtime routine  · Create a relaxing bedtime routine. You might want to take a warm shower or bath, listen to soothing music, or drink a cup of noncaffeinated tea. · Go to bed at the same time every night. And get up at the same time every morning, even if you feel tired. What to avoid  · Limit caffeine (coffee, tea, caffeinated sodas) during the day, and don't have any for at least 4 to 6 hours before bedtime. · Don't drink alcohol before bedtime. Alcohol can cause you to wake up more often during the night. · Don't smoke or use tobacco, especially in the evening. Nicotine can keep you awake. · Don't take naps during the day, especially close to bedtime. · Don't lie in bed awake for too long. If you can't fall asleep, or if you wake up in the middle of the night and can't get back to sleep within 15 minutes or so, get out of bed and go to another room until you feel sleepy. · Don't take medicine right before bed that may keep you awake or make you feel hyper or energized. Your doctor can tell you if your medicine may do this and if you can take it earlier in the day. If you can't sleep  · Imagine yourself in a peaceful, pleasant scene. Focus on the details and feelings of being in a place that is relaxing. · Get up and do a quiet or boring activity until you feel sleepy. · Don't drink any liquids after 6 p.m. if you wake up often because you have to go to the bathroom. Where can you learn more? Go to https://Bergey's.TV TubeX. org and sign in to your Canara account. Enter J896 in the [facility] box to learn more about \"Learning About Sleeping Well. \"     If you do not have an account, please click on the \"Sign Up Now\" link. Current as of: September 11, 2018  Content Version: 11.9  © 3649-0509 [employer]app[employer]Go, Incorporated. Care instructions adapted under license by [facility] ([facility]).  If you have questions about a medical condition or this instruction, always ask your healthcare professional. [employer] disclaims any warranty or liability for your use of this information.

## 2019-04-02 NOTE — PROGRESS NOTES
Subjective:      Patient ID: Phil Seen is a 69 y.o. female. HPI  Pt here for a check up. Reviewed BMI of 38. Encouraged diet, exercise and weight loss. Has noticed increased leg swelling x 1 week. Worse as the day goes on. On HCTZ. On no meds to cause side effects of swelling. Has some SOB. Inhalers help that. , non smoker, pmh reviewed. Also trouble sleeping at night. Review of Systems   Constitutional: Negative for chills, fatigue, fever and unexpected weight change. HENT: Negative for congestion, ear pain, rhinorrhea and sore throat. Eyes: Negative for pain and visual disturbance. Respiratory: Positive for shortness of breath. Negative for cough, chest tightness and wheezing. Cardiovascular: Positive for leg swelling. Negative for chest pain and palpitations. Gastrointestinal: Negative for abdominal pain, blood in stool, constipation, diarrhea, nausea and vomiting. Genitourinary: Negative for difficulty urinating, frequency, hematuria and urgency. Musculoskeletal: Negative for back pain, joint swelling, myalgias and neck pain. Skin: Negative for rash. Neurological: Negative for dizziness and headaches. Hematological: Negative for adenopathy. Does not bruise/bleed easily. Psychiatric/Behavioral: Positive for sleep disturbance. Negative for behavioral problems. The patient is not nervous/anxious. Objective:   Physical Exam   Constitutional: She is oriented to person, place, and time. She appears well-developed and well-nourished. HENT:   Head: Normocephalic and atraumatic. Right Ear: External ear normal.   Left Ear: External ear normal.   Nose: Nose normal.   Mouth/Throat: Oropharynx is clear and moist.   Eyes: Pupils are equal, round, and reactive to light. EOM are normal.   Neck: Neck supple. Carotid bruit is not present. No thyromegaly present. Cardiovascular: Normal rate, regular rhythm and normal heart sounds.    Pulmonary/Chest: Breath sounds normal. She has no wheezes. She has no rales. Abdominal: Soft. Bowel sounds are normal. There is no tenderness. There is no rebound and no guarding. Musculoskeletal: Normal range of motion. Right lower leg: She exhibits edema. Left lower leg: She exhibits edema. 2+ pitting edema bilaterally   Lymphadenopathy:     She has no cervical adenopathy. Neurological: She is alert and oriented to person, place, and time. She has normal reflexes. No cranial nerve deficit. Skin: Skin is warm and dry. No rash noted. Psychiatric: She has a normal mood and affect. Nursing note and vitals reviewed. Lab Results   Component Value Date    LABA1C 6.1 (H) 01/25/2019     No results found for: EAG    Assessment:      Peripheral edema  DM 2  Insomnia      Plan:      Hold HCTZ x 1 week  Bumex 0.5 mg daily  Daily weights  Give update in 1 week. Trial of doxepin 10 mg nightly for sleep. Angelina Mcmahon received counseling on the following healthy behaviors: nutrition, exercise and medication adherence    Patient given educational materials on Diabetes, Hyperlipidemia, Nutrition, Exercise and Hypertension    I have instructed Angelina Mcmahon to complete a self tracking handout on Blood Sugars , Blood Pressures  and Weights and instructed them to bring it with them to her next appointment. Discussed use, benefit, and side effects of prescribed medications. Barriers to medication compliance addressed. All patient questions answered. Pt voiced understanding.            Josse Ferreira MD

## 2019-04-12 ENCOUNTER — HOSPITAL ENCOUNTER (EMERGENCY)
Age: 74
Discharge: HOME | End: 2019-04-12
Attending: EMERGENCY MEDICINE
Payer: MEDICARE

## 2019-04-12 ENCOUNTER — APPOINTMENT (OUTPATIENT)
Dept: CT IMAGING | Age: 74
End: 2019-04-12
Payer: MEDICARE

## 2019-04-12 ENCOUNTER — APPOINTMENT (OUTPATIENT)
Dept: GENERAL RADIOLOGY | Age: 74
End: 2019-04-12
Payer: MEDICARE

## 2019-04-12 VITALS
HEART RATE: 72 BPM | OXYGEN SATURATION: 98 % | TEMPERATURE: 97.9 F | DIASTOLIC BLOOD PRESSURE: 80 MMHG | RESPIRATION RATE: 18 BRPM | SYSTOLIC BLOOD PRESSURE: 157 MMHG

## 2019-04-12 DIAGNOSIS — M79.89 LEG SWELLING: ICD-10-CM

## 2019-04-12 DIAGNOSIS — R06.00 DYSPNEA, UNSPECIFIED TYPE: Primary | ICD-10-CM

## 2019-04-12 DIAGNOSIS — R60.9 PERIPHERAL EDEMA: ICD-10-CM

## 2019-04-12 DIAGNOSIS — J45.21 EXACERBATION OF INTERMITTENT ASTHMA, UNSPECIFIED ASTHMA SEVERITY: ICD-10-CM

## 2019-04-12 DIAGNOSIS — K21.9 GASTROESOPHAGEAL REFLUX DISEASE, ESOPHAGITIS PRESENCE NOT SPECIFIED: ICD-10-CM

## 2019-04-12 LAB
ALBUMIN SERPL-MCNC: 3.7 G/DL (ref 3.5–5.1)
ALP BLD-CCNC: 112 U/L (ref 38–126)
ALT SERPL-CCNC: 7 U/L (ref 11–66)
ANION GAP SERPL CALCULATED.3IONS-SCNC: 12 MEQ/L (ref 8–16)
AST SERPL-CCNC: 12 U/L (ref 5–40)
BASOPHILS # BLD: 0.5 %
BASOPHILS ABSOLUTE: 0 THOU/MM3 (ref 0–0.1)
BILIRUB SERPL-MCNC: 0.3 MG/DL (ref 0.3–1.2)
BILIRUBIN DIRECT: < 0.2 MG/DL (ref 0–0.3)
BUN BLDV-MCNC: 10 MG/DL (ref 7–22)
CALCIUM SERPL-MCNC: 8.9 MG/DL (ref 8.5–10.5)
CHLORIDE BLD-SCNC: 100 MEQ/L (ref 98–111)
CO2: 27 MEQ/L (ref 23–33)
CREAT SERPL-MCNC: 0.8 MG/DL (ref 0.4–1.2)
EKG ATRIAL RATE: 71 BPM
EKG P AXIS: 42 DEGREES
EKG P-R INTERVAL: 196 MS
EKG Q-T INTERVAL: 434 MS
EKG QRS DURATION: 82 MS
EKG QTC CALCULATION (BAZETT): 471 MS
EKG R AXIS: 0 DEGREES
EKG T AXIS: 44 DEGREES
EKG VENTRICULAR RATE: 71 BPM
EOSINOPHIL # BLD: 1.4 %
EOSINOPHILS ABSOLUTE: 0.1 THOU/MM3 (ref 0–0.4)
ERYTHROCYTE [DISTWIDTH] IN BLOOD BY AUTOMATED COUNT: 15.1 % (ref 11.5–14.5)
ERYTHROCYTE [DISTWIDTH] IN BLOOD BY AUTOMATED COUNT: 48.3 FL (ref 35–45)
GFR SERPL CREATININE-BSD FRML MDRD: 70 ML/MIN/1.73M2
GLUCOSE BLD-MCNC: 106 MG/DL (ref 70–108)
HCT VFR BLD CALC: 36.1 % (ref 37–47)
HEMOGLOBIN: 11.7 GM/DL (ref 12–16)
IMMATURE GRANS (ABS): 0.03 THOU/MM3 (ref 0–0.07)
IMMATURE GRANULOCYTES: 0.3 %
LYMPHOCYTES # BLD: 31.1 %
LYMPHOCYTES ABSOLUTE: 2.7 THOU/MM3 (ref 1–4.8)
MCH RBC QN AUTO: 28.7 PG (ref 26–33)
MCHC RBC AUTO-ENTMCNC: 32.4 GM/DL (ref 32.2–35.5)
MCV RBC AUTO: 88.5 FL (ref 81–99)
MONOCYTES # BLD: 7.9 %
MONOCYTES ABSOLUTE: 0.7 THOU/MM3 (ref 0.4–1.3)
NUCLEATED RED BLOOD CELLS: 0 /100 WBC
OSMOLALITY CALCULATION: 277 MOSMOL/KG (ref 275–300)
PLATELET # BLD: 289 THOU/MM3 (ref 130–400)
PMV BLD AUTO: 10.2 FL (ref 9.4–12.4)
POTASSIUM SERPL-SCNC: 3.6 MEQ/L (ref 3.5–5.2)
PRO-BNP: 1374 PG/ML (ref 0–900)
RBC # BLD: 4.08 MILL/MM3 (ref 4.2–5.4)
SEG NEUTROPHILS: 58.8 %
SEGMENTED NEUTROPHILS ABSOLUTE COUNT: 5.1 THOU/MM3 (ref 1.8–7.7)
SODIUM BLD-SCNC: 139 MEQ/L (ref 135–145)
TOTAL PROTEIN: 6.7 G/DL (ref 6.1–8)
TROPONIN T: < 0.01 NG/ML
WBC # BLD: 8.7 THOU/MM3 (ref 4.8–10.8)

## 2019-04-12 PROCEDURE — 71046 X-RAY EXAM CHEST 2 VIEWS: CPT

## 2019-04-12 PROCEDURE — 99285 EMERGENCY DEPT VISIT HI MDM: CPT

## 2019-04-12 PROCEDURE — 82248 BILIRUBIN DIRECT: CPT

## 2019-04-12 PROCEDURE — 85025 COMPLETE CBC W/AUTO DIFF WBC: CPT

## 2019-04-12 PROCEDURE — 6370000000 HC RX 637 (ALT 250 FOR IP): Performed by: EMERGENCY MEDICINE

## 2019-04-12 PROCEDURE — 80053 COMPREHEN METABOLIC PANEL: CPT

## 2019-04-12 PROCEDURE — 83880 ASSAY OF NATRIURETIC PEPTIDE: CPT

## 2019-04-12 PROCEDURE — 71275 CT ANGIOGRAPHY CHEST: CPT

## 2019-04-12 PROCEDURE — 36415 COLL VENOUS BLD VENIPUNCTURE: CPT

## 2019-04-12 PROCEDURE — 6360000004 HC RX CONTRAST MEDICATION: Performed by: EMERGENCY MEDICINE

## 2019-04-12 PROCEDURE — 93005 ELECTROCARDIOGRAM TRACING: CPT

## 2019-04-12 PROCEDURE — 84484 ASSAY OF TROPONIN QUANT: CPT

## 2019-04-12 PROCEDURE — 94640 AIRWAY INHALATION TREATMENT: CPT

## 2019-04-12 RX ORDER — PREDNISONE 50 MG/1
50 TABLET ORAL DAILY
Qty: 4 TABLET | Refills: 0 | Status: SHIPPED | OUTPATIENT
Start: 2019-04-12 | End: 2019-05-24 | Stop reason: ALTCHOICE

## 2019-04-12 RX ORDER — IPRATROPIUM BROMIDE AND ALBUTEROL SULFATE 2.5; .5 MG/3ML; MG/3ML
1 SOLUTION RESPIRATORY (INHALATION) ONCE
Status: COMPLETED | OUTPATIENT
Start: 2019-04-12 | End: 2019-04-12

## 2019-04-12 RX ORDER — OMEPRAZOLE 40 MG/1
40 CAPSULE, DELAYED RELEASE ORAL DAILY
Qty: 30 CAPSULE | Refills: 5 | Status: SHIPPED | OUTPATIENT
Start: 2019-04-12 | End: 2019-07-09 | Stop reason: SDUPTHER

## 2019-04-12 RX ORDER — PREDNISONE 20 MG/1
60 TABLET ORAL ONCE
Status: COMPLETED | OUTPATIENT
Start: 2019-04-12 | End: 2019-04-12

## 2019-04-12 RX ADMIN — PREDNISONE 60 MG: 20 TABLET ORAL at 17:21

## 2019-04-12 RX ADMIN — IPRATROPIUM BROMIDE AND ALBUTEROL SULFATE 1 AMPULE: .5; 3 SOLUTION RESPIRATORY (INHALATION) at 15:35

## 2019-04-12 RX ADMIN — IOPAMIDOL 80 ML: 755 INJECTION, SOLUTION INTRAVENOUS at 16:22

## 2019-04-12 ASSESSMENT — ENCOUNTER SYMPTOMS
WHEEZING: 0
TROUBLE SWALLOWING: 0
COUGH: 1
PHOTOPHOBIA: 0
SHORTNESS OF BREATH: 1
NAUSEA: 0
VOMITING: 0
DIARRHEA: 0
FACIAL SWELLING: 0
SORE THROAT: 0
BACK PAIN: 0
CHEST TIGHTNESS: 0
BLOOD IN STOOL: 0
ABDOMINAL PAIN: 0
VOICE CHANGE: 0

## 2019-04-12 NOTE — TELEPHONE ENCOUNTER
Pt is having increased swelling and SOB, informed her of RAR recommendations to go to ED, Pt was agreeable. She has 5 days of Bumex left and will call for refill next week. Notified her Rx was sent to Pharmacy.

## 2019-04-12 NOTE — ED TRIAGE NOTES
Patient presents with bilateral leg swelling and shortness of breath. States she does have a history of asthma but no cardiac history. States her legs have been swollen for 3 weeks. Denies being around anyone that's been sick.  Patient states most of her shortness of breath is on exertion

## 2019-04-12 NOTE — TELEPHONE ENCOUNTER
Pt called in her weights since her last appointment. 224  224.8  228  228  227.2  229.2  230.6  229.4  230.4    Pt is currently taking bumetanide  0.5 mg tablet daily. Do you want her to stay on bumetanide or go back to hydrochlorothiazide  25 mg daily?     Normanicole Gambino needs refill of   Requested Prescriptions     Pending Prescriptions Disp Refills    omeprazole (PRILOSEC) 40 MG delayed release capsule 30 capsule 0     Sig: Take 1 capsule by mouth daily       Last Filled on:  3/21/2019    Last Visit Date:  4/2/2019    Next Visit Date:    Visit date not found

## 2019-04-12 NOTE — TELEPHONE ENCOUNTER
Weights are increasing. Is she swelling more, SOB??  If so, needs to go to ED.   If comfortable, would need to increase bumex and get blood work.  ep'ed omeprazole to pharm requested

## 2019-04-15 ENCOUNTER — TELEPHONE (OUTPATIENT)
Dept: FAMILY MEDICINE CLINIC | Age: 74
End: 2019-04-15

## 2019-04-16 ENCOUNTER — OFFICE VISIT (OUTPATIENT)
Dept: FAMILY MEDICINE CLINIC | Age: 74
End: 2019-04-16
Payer: MEDICARE

## 2019-04-16 VITALS
OXYGEN SATURATION: 96 % | BODY MASS INDEX: 35.61 KG/M2 | HEART RATE: 59 BPM | DIASTOLIC BLOOD PRESSURE: 86 MMHG | RESPIRATION RATE: 16 BRPM | SYSTOLIC BLOOD PRESSURE: 136 MMHG | WEIGHT: 214 LBS

## 2019-04-16 DIAGNOSIS — I50.43 ACUTE ON CHRONIC COMBINED SYSTOLIC AND DIASTOLIC CONGESTIVE HEART FAILURE (HCC): ICD-10-CM

## 2019-04-16 DIAGNOSIS — J45.31 MILD PERSISTENT ASTHMA WITH EXACERBATION: Primary | ICD-10-CM

## 2019-04-16 DIAGNOSIS — J45.40 MODERATE PERSISTENT ASTHMA WITHOUT COMPLICATION: ICD-10-CM

## 2019-04-16 DIAGNOSIS — E66.01 CLASS 2 SEVERE OBESITY WITH SERIOUS COMORBIDITY AND BODY MASS INDEX (BMI) OF 35.0 TO 35.9 IN ADULT, UNSPECIFIED OBESITY TYPE (HCC): ICD-10-CM

## 2019-04-16 DIAGNOSIS — R60.9 PERIPHERAL EDEMA: ICD-10-CM

## 2019-04-16 PROCEDURE — 99213 OFFICE O/P EST LOW 20 MIN: CPT | Performed by: FAMILY MEDICINE

## 2019-04-16 RX ORDER — ALBUTEROL SULFATE 90 UG/1
AEROSOL, METERED RESPIRATORY (INHALATION)
Qty: 1 INHALER | Refills: 3 | Status: SHIPPED | OUTPATIENT
Start: 2019-04-16 | End: 2019-07-09 | Stop reason: SDUPTHER

## 2019-04-16 RX ORDER — BUMETANIDE 0.5 MG/1
0.5 TABLET ORAL DAILY
Qty: 30 TABLET | Refills: 3 | Status: SHIPPED | OUTPATIENT
Start: 2019-04-16 | End: 2019-07-09 | Stop reason: SDUPTHER

## 2019-04-16 ASSESSMENT — ENCOUNTER SYMPTOMS
BLOOD IN STOOL: 0
EYE PAIN: 0
DIARRHEA: 0
SHORTNESS OF BREATH: 1
CONSTIPATION: 0
NAUSEA: 0
ABDOMINAL PAIN: 0
CHEST TIGHTNESS: 0
COUGH: 0
BACK PAIN: 0
SORE THROAT: 0
RHINORRHEA: 0
VOMITING: 0
WHEEZING: 0

## 2019-04-16 NOTE — PROGRESS NOTES
Subjective:      Patient ID: Randy Melvin is a 69 y.o. female. HPI  Pt here for follow up of ED for SOB and increased leg swelling. REviewed BMI of 36. Encouraged diet, exercise and weight loss. Reviewed health maintenance. Given prednisone and duoneb. Is feeling better with improved SOB and swelling. , non smoker, pmh reviewed. Review of Systems   Constitutional: Negative for chills, fatigue, fever and unexpected weight change. HENT: Negative for congestion, ear pain, rhinorrhea and sore throat. Eyes: Negative for pain and visual disturbance. Respiratory: Positive for shortness of breath. Negative for cough, chest tightness and wheezing. Cardiovascular: Positive for leg swelling. Negative for chest pain and palpitations. Gastrointestinal: Negative for abdominal pain, blood in stool, constipation, diarrhea, nausea and vomiting. Genitourinary: Negative for difficulty urinating, frequency, hematuria and urgency. Musculoskeletal: Negative for back pain, joint swelling, myalgias and neck pain. Skin: Negative for rash. Neurological: Negative for dizziness and headaches. Hematological: Negative for adenopathy. Does not bruise/bleed easily. Psychiatric/Behavioral: Negative for behavioral problems and sleep disturbance. The patient is not nervous/anxious. Objective:   Physical Exam   Constitutional: She is oriented to person, place, and time. She appears well-developed and well-nourished. HENT:   Head: Normocephalic and atraumatic. Right Ear: External ear normal.   Left Ear: External ear normal.   Nose: Nose normal.   Mouth/Throat: Oropharynx is clear and moist.   Eyes: Pupils are equal, round, and reactive to light. EOM are normal.   Neck: Neck supple. Carotid bruit is not present. No thyromegaly present. Cardiovascular: Normal rate, regular rhythm and normal heart sounds. Pulmonary/Chest: Breath sounds normal. She has no wheezes. She has no rales.    Abdominal: Soft. Bowel sounds are normal. There is no tenderness. There is no rebound and no guarding. Musculoskeletal: Normal range of motion. Right lower leg: She exhibits edema. Left lower leg: She exhibits edema. Mild edema, wearing compression stockings. Lymphadenopathy:     She has no cervical adenopathy. Neurological: She is alert and oriented to person, place, and time. She has normal reflexes. No cranial nerve deficit. Skin: Skin is warm and dry. No rash noted. Psychiatric: She has a normal mood and affect. Nursing note and vitals reviewed.       Assessment:      Asthma exacerbation  CHF flare  Obesity      Plan:      Finish prednisone  Refill ventolin inhaler  Stop hctz  Bumex 0.5 mg daily            Lesa Davis MD

## 2019-04-16 NOTE — PATIENT INSTRUCTIONS
Patient Education        Asthma in Adults: Care Instructions  Your Care Instructions    During an asthma attack, your airways swell and narrow as a reaction to certain things (triggers). This makes it hard to breathe. You may be able to prevent asthma attacks if you avoid the things that set off your asthma symptoms. Keeping your asthma under control and treating symptoms before they get bad can help you avoid severe attacks. If you can control your asthma, you may be able to do all of your normal daily activities. You may also avoid asthma attacks and trips to the hospital.  Follow-up care is a key part of your treatment and safety. Be sure to make and go to all appointments, and call your doctor if you are having problems. It's also a good idea to know your test results and keep a list of the medicines you take. How can you care for yourself at home? · Follow your asthma action plan so you can manage your symptoms at home. An asthma action plan will help you prevent and control airway reactions and will tell you what to do during an asthma attack. If you do not have an asthma action plan, work with your doctor to build one. · Take your asthma medicine exactly as prescribed. Medicine plays an important role in controlling asthma. Talk to your doctor right away if you have any questions about what to take and how to take it. ? Use your quick-relief medicine when you have symptoms of an attack. Quick-relief medicine often is an albuterol inhaler. Some people need to use quick-relief medicine before they exercise. ? Take your controller medicine every day, not just when you have symptoms. Controller medicine is usually an inhaled corticosteroid. The goal is to prevent problems before they occur. Do not use your controller medicine to try to treat an attack that has already started. It does not work fast enough to help.   ? If your doctor prescribed corticosteroid pills to use during an attack, take them as directed. They may take hours to work, but they may shorten the attack and help you breathe better. ? Keep your quick-relief medicine with you at all times. · Talk to your doctor before using other medicines. Some medicines, such as aspirin, can cause asthma attacks in some people. · Check yourself for asthma symptoms to know which step to follow in your action plan. Watch for things like being short of breath, having chest tightness, coughing, and wheezing. Also notice if symptoms wake you up at night or if you get tired quickly when you exercise. · If you have a peak flow meter, use it to check how well you are breathing. This can help you predict when an asthma attack is going to occur. Then you can take medicine to prevent the asthma attack or make it less severe. · See your doctor regularly. These visits will help you learn more about asthma and what you can do to control it. Your doctor will monitor your treatment to make sure the medicine is helping you. · Keep track of your asthma attacks and your treatment. After you have had an attack, write down what triggered it, what helped end it, and any concerns you have about your asthma action plan. Take your diary when you see your doctor. You can then review your asthma action plan and decide if it is working. · Do not smoke or allow others to smoke around you. Avoid smoky places. Smoking makes asthma worse. If you need help quitting, talk to your doctor about stop-smoking programs and medicines. These can increase your chances of quitting for good. · Learn what triggers an asthma attack for you, and avoid the triggers when you can. Common triggers include colds, smoke, air pollution, dust, pollen, mold, pets, cockroaches, stress, and cold air. · Avoid colds and the flu. Get a pneumococcal vaccine shot. If you have had one before, ask your doctor whether you need a second dose. Get a flu vaccine every fall.  If you must be around people with colds or the flu, wash your hands often. When should you call for help? Call 911 anytime you think you may need emergency care. For example, call if:    · You have severe trouble breathing.    Call your doctor now or seek immediate medical care if:    · Your symptoms do not get better after you have followed your asthma action plan.     · You cough up yellow, dark brown, or bloody mucus (sputum).    Watch closely for changes in your health, and be sure to contact your doctor if:    · Your coughing and wheezing get worse.     · You need to use quick-relief medicine on more than 2 days a week (unless it is just for exercise).     · You need help figuring out what is triggering your asthma attacks. Where can you learn more? Go to https://Kueski.EmpowrNet. org and sign in to your Inbox account. Enter P597 in the [facility] box to learn more about \"Asthma in Adults: Care Instructions. \"     If you do not have an account, please click on the \"Sign Up Now\" link. Current as of: September 5, 2018  Content Version: 11.9  © 0363-5073 Healthwise, Incorporated. Care instructions adapted under license by [facility] ([facility]). If you have questions about a medical condition or this instruction, always ask your healthcare professional. [address] any warranty or liability for your use of this information. Patient Education        Heart Failure: Care Instructions  Your Care Instructions    Heart failure occurs when your heart does not pump as much blood as the body needs. Failure does not mean that the heart has stopped pumping but rather that it is not pumping as well as it should. Over time, this causes fluid buildup in your lungs and other parts of your body. Fluid buildup can cause shortness of breath, fatigue, swollen ankles, and other problems.  By taking medicines regularly, reducing sodium (salt) in your diet, checking your weight every day, and making lifestyle changes, you can feel better and live longer. Follow-up care is a key part of your treatment and safety. Be sure to make and go to all appointments, and call your doctor if you are having problems. It's also a good idea to know your test results and keep a list of the medicines you take. How can you care for yourself at home? Medicines    · Be safe with medicines. Take your medicines exactly as prescribed. Call your doctor if you think you are having a problem with your medicine.     · Do not take any vitamins, over-the-counter medicine, or herbal products without talking to your doctor first. Jimmie Paul not take ibuprofen (Advil or Motrin) and naproxen (Aleve) without talking to your doctor first. They could make your heart failure worse.     · You may be taking some of the following medicine. ? Beta-blockers can slow heart rate, decrease blood pressure, and improve your condition. Taking a beta-blocker may lower your chance of needing to be hospitalized. ? Angiotensin-converting enzyme inhibitors (ACEIs) reduce the heart's workload, lower blood pressure, and reduce swelling. Taking an ACEI may lower your chance of needing to be hospitalized again. ? Angiotensin II receptor blockers (ARBs) work like ACEIs. Your doctor may prescribe them instead of ACEIs. ? Diuretics, also called water pills, reduce swelling. ? Potassium supplements replace this important mineral, which is sometimes lost with diuretics. ? Aspirin and other blood thinners prevent blood clots, which can cause a stroke or heart attack.    You will get more details on the specific medicines your doctor prescribes. Diet    · Your doctor may suggest that you limit sodium to 2,000 milligrams (mg) a day or less. That is less than 1 teaspoon of salt a day, including all the salt you eat in cooking or in packaged foods. People get most of their sodium from processed foods.  Fast food and restaurant meals also tend to be very high in sodium.     · Ask your doctor how much liquid you can drink each day. You may have to limit liquids.    Weight    · Weigh yourself without clothing at the same time each day. Record your weight. Call your doctor if you have a sudden weight gain, such as more than 2 to 3 pounds in a day or 5 pounds in a week. (Your doctor may suggest a different range of weight gain.) A sudden weight gain may mean that your heart failure is getting worse.    Activity level    · Start light exercise (if your doctor says it is okay). Even if you can only do a small amount, exercise will help you get stronger, have more energy, and manage your weight and your stress. Walking is an easy way to get exercise. Start out by walking a little more than you did before. Bit by bit, increase the amount you walk.     · When you exercise, watch for signs that your heart is working too hard. You are pushing yourself too hard if you cannot talk while you are exercising. If you become short of breath or dizzy or have chest pain, stop, sit down, and rest.     · If you feel \"wiped out\" the day after you exercise, walk slower or for a shorter distance until you can work up to a better pace.     · Get enough rest at night. Sleeping with 1 or 2 pillows under your upper body and head may help you breathe easier.    Lifestyle changes    · Do not smoke. Smoking can make a heart condition worse. If you need help quitting, talk to your doctor about stop-smoking programs and medicines. These can increase your chances of quitting for good. Quitting smoking may be the most important step you can take to protect your heart.     · Limit alcohol to 2 drinks a day for men and 1 drink a day for women. Too much alcohol can cause health problems.     · Avoid getting sick from colds and the flu. Get a pneumococcal vaccine shot. If you have had one before, ask your doctor whether you need another dose. Get a flu shot each year. If you must be around people with colds or the flu, wash your hands often.    When should you call for help? Call 911 if you have symptoms of sudden heart failure such as:    · You have severe trouble breathing.     · You cough up pink, foamy mucus.     · You have a new irregular or rapid heartbeat.    Call your doctor now or seek immediate medical care if:    · You have new or increased shortness of breath.     · You are dizzy or lightheaded, or you feel like you may faint.     · You have sudden weight gain, such as more than 2 to 3 pounds in a day or 5 pounds in a week. (Your doctor may suggest a different range of weight gain.)     · You have increased swelling in your legs, ankles, or feet.     · You are suddenly so tired or weak that you cannot do your usual activities.    Watch closely for changes in your health, and be sure to contact your doctor if you develop new symptoms. Where can you learn more? Go to https://Glofox.Chirply. org and sign in to your Ibelem account. Enter L603 in the Apprion box to learn more about \"Heart Failure: Care Instructions. \"     If you do not have an account, please click on the \"Sign Up Now\" link. Current as of: July 22, 2018  Content Version: 11.9  © 1430-5808 [employer], Incorporated. Care instructions adapted under license by [facility] ([facility]). If you have questions about a medical condition or this instruction, always ask your healthcare professional. [place] [address] any warranty or liability for your use of this information.

## 2019-04-17 DIAGNOSIS — F41.9 ANXIETY: ICD-10-CM

## 2019-04-17 RX ORDER — LORAZEPAM 0.5 MG/1
0.5 TABLET ORAL EVERY 12 HOURS PRN
Qty: 60 TABLET | Refills: 2 | Status: SHIPPED | OUTPATIENT
Start: 2019-04-17 | End: 2019-07-25 | Stop reason: SDUPTHER

## 2019-04-17 NOTE — TELEPHONE ENCOUNTER
Patient called to request a refill of Lorazepam 0.5 mg. She was last seen in the office yesterday. She uses [facility] in [place].   Please call her back only if any problems

## 2019-04-17 NOTE — TELEPHONE ENCOUNTER
ep'ed ativan to pharm requested      Controlled Substances Monitoring:     RX Monitoring 4/17/2019   Attestation The Prescription Monitoring Report for this patient was reviewed today.    Chronic Pain Routine Monitoring No signs of potential drug abuse or diversion identified: otherwise, see note documentation

## 2019-04-23 NOTE — PROGRESS NOTES
Ms. 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 Her serial imaging and labs have been negative for metastatic disease. She did undergo an office cystoscopy with Dr. Timothy Vasquez in January 2019 for an elevated Bladder Cx of 0.24. Her cytoscopy was found to be normal. She was recently evaluated in the hospital for dyspnea, thought to be an exacerbation of asthma. She was given a breathing treatment and steroids. She states that she is breathing better but notices pain in her neck and above her left clavicle. She states that pain started last evening and is especially noticeable with touch. She believes the area is swollen. She denies trauma to the area or insect bite. Denies dental problems or recent dental work. She does not own an animal or been exposed to an animal scratch. She does have a \"scratchy\" throat but attributes that to mild nasal drainage from her allergies. She does have an unproductive cough. She reports a hoarse voice over the last several weeks. She denies HA, chest pain, fever/chills, night sweats, irregular bone pain, N/V, leg pain, hemoptysis, and lightheadedness. She is undergoing evaluation for an abnormal mammogram.  In regards to her urinary health, she denies dysuria, hematuria, weakened stream, urinary retention, or flank pain. She reports mild frequency and urgency but is not taking any medication for her irritative symptoms. She is having regular bowel movements and her appetite is normal. She presents for further evaluation.     Past Medical History:   Diagnosis Date    Anxiety     Arthritis     Asthma     [facility] ([facility] [address]     Depression     Diabetes mellitus ([address]     Headache(784.0)     Hyperlipidemia     Hypertension     Nausea & vomiting     Persistent proteinuria associated with type 2 diabetes mellitus ([address]     Unspecified cerebral artery (NITROSTAT) 0.4 MG SL tablet Place 1 tablet under the tongue every 5 minutes as needed for Chest pain 25 tablet 1    Cholecalciferol (VITAMIN D3) 5000 units TABS Take by mouth daily       aspirin 81 MG tablet Take 81 mg by mouth daily      fexofenadine (ALLEGRA) 180 MG tablet Take 180 mg by mouth daily.  Multiple Vitamin (MULTI-VITAMINS PO) Take  by mouth daily. Current Facility-Administered Medications on File Prior to Visit   Medication Dose Route Frequency Provider Last Rate Last Dose    cefOXitin (MEFOXIN) 2 g in dextrose 5% 50 mL (mini-bag)  2 g Intravenous Once Sam Maxwell MD           Allergies   Allergen Reactions    Latex Hives     Patient noted in preop that she might be allergic - maybe breaking out when exposed.     Demerol Hcl [Meperidine] Nausea And Vomiting     confusion    Propoxyphene     Tape Zenaida Broad Tape]        Family History   Problem Relation Age of Onset    Cancer Mother     Heart Disease Mother     High Blood Pressure Mother     High Cholesterol Mother     Stroke Mother     Cancer Father     Heart Disease Father     High Blood Pressure Father     High Cholesterol Father     Cancer Sister        Social History     Socioeconomic History    Marital status:      Spouse name: Not on file    Number of children: Not on file    Years of education: Not on file    Highest education level: Not on file   Occupational History    Not on file   Social Needs    Financial resource strain: Not on file    Food insecurity:     Worry: Not on file     Inability: Not on file    Transportation needs:     Medical: Not on file     Non-medical: Not on file   Tobacco Use    Smoking status: Never Smoker    Smokeless tobacco: Never Used   Substance and Sexual Activity    Alcohol use: No    Drug use: No    Sexual activity: Not on file   Lifestyle    Physical activity:     Days per week: Not on file     Minutes per session: Not on file    Stress: Not on file Relationships    Social connections:     Talks on phone: Not on file     Gets together: Not on file     Attends [employer]ism service: Not on file     Active member of club or organization: Not on file     Attends meetings of clubs or organizations: Not on file     Relationship status: Not on file    Intimate partner violence:     Fear of current or ex partner: Not on file     Emotionally abused: Not on file     Physically abused: Not on file     Forced sexual activity: Not on file   Other Topics Concern    Not on file   Social History Narrative    Not on file       Review of Systems  HEENT: Positive for hoarse voice, nasal drainage, mild sore throat. Denies difficulty swallowing. No chest pain, shortness of breath, abdominal pain, extremity pain or weakness, and no neurological deficits. No rashes. No known swollen glands or lymph nodes.  symptoms per HPI. The remainder of the review of symptoms is negative.     Exam    /78   Ht 5' 5\" (1.651 m)   Wt 227 lb (103 kg)   BMI 37.77 kg/m²      Constitutional: Oriented to person, place, and time. Vital signs are normal. Appears well-developed and well-nourished. cooperative. No distress. HENT:    Head: Normocephalic and atraumatic.    Eyes: EOM are normal. Pupils are equal, round, and reactive to light. Right eye exhibits no discharge. Left eye exhibits no discharge. No scleral icterus. Throat: Mild pharyngeal cobblestoning noted. No erythema or exudate. Neck: Trachea normal. No JVD present. No obvious masses. Refer to Lymphadenopathy  Cardiovascular: Normal rate and regular rhythm. S1 and S2 normal.  Pulmonary/Chest: Effort normal. No respiratory distress. No wheezes, rhonchi, or rales. Abdominal: Soft. Exhibits no distension. There is no generalized tenderness. There is no rebound, guarding, or rigidity. No CVA tenderness. Musculoskeletal: No pitting edema or calf tenderness.    Lymphadenopathy: Left superficial cervical, deep cervical, and supraclavicular lymphadenopathy present. Scattered nodes are 5 mm and rubbery. Not fixed. Very tender to light palpation. Moderate swelling extending from left SCM area down to left clavicle and extending toward left shoulder. Total area of swelling 5 inches. No erythema or red streaking noticeable. No right lymphadenopathy present. No bilateral axillary lymphadenopathy. Neurological: Alert and oriented to person, place, and time. No cranial nerve deficit. Skin: Skin is warm and dry. Not diaphoretic. Psychiatric: Normal mood and affect. Behavior is normal.   Nursing note and vitals reviewed. Labs    Results for POC orders placed in visit on 04/25/19   POCT Urinalysis No Micro (Auto)   Result Value Ref Range    Glucose, Ur Negative NEGATIVE mg/dl    Bilirubin Urine Negative     Ketones, Urine Negative NEGATIVE    Specific Gravity, Urine 1.020 1.002 - 1.03    Blood, UA POC Trace-intact NEGATIVE    pH, Urine 7.00 5.0 - 9.0    Protein, Urine Trace (A) NEGATIVE mg/dl    Urobilinogen, Urine 0.20 0.0 - 1.0 eu/dl    Nitrite, Urine Negative NEGATIVE    Leukocyte Clumps, Urine Small (A) NEGATIVE    Color, Urine Yellow YELLOW-STR    Character, Urine Clear [id number]       Lab Results   Component Value Date    CREATININE 0.8 04/12/2019    BUN 10 04/12/2019     04/12/2019    K 3.6 04/12/2019     04/12/2019    CO2 27 04/12/2019         Plan:  1. Left renal cell carcinoma:  CT scan of the abdomen and pelvis and CXR (December 2018) are unremarkable for metastatic disease. Small eventration of the right hemidiaphragm has been noted posteriorly. Patient denies any dyspnea. Likely congenital in nature. If upon repeat imaging, this finding becomes larger or patient becomes symptomatic, consultation to CT surgeon will be obtained. Danita Lacey repeat CT of abdomen and pelvis with and without contrast, CXR, and labs in December 2019. Repeat Bladder Cx and urine cytology today.  Call if any questions or concerns in the meantime.      2. Mild diverticulosis- Patient follows with Dr. Bret Graves. Asymptomatic.     3. Microscopic hematuria/Abnormal Bladder Cx- Per urine dip. Will obtain urine reflex to culture, Bladder Cx, and urine cytology today. 4. Left superficial cervical/supraclavicular lymphadenopathy- Sudden onset. Worsening. Moderately painful to palpation. Refer to PE findings. Bacterial, Viral, or malignant etiologies possible. Will obtain CT of the neck (soft tissue) and chest with contrast for further delineation. Right breast US in 2/19: BIRADS 3. Will start Augmentin 875 mg po twice daily and Doxycycline 100 mg po twice daily x 7 days. Patient was instructed to go to the ED if swelling or pain worsens or erythema/red streaking noted. Will obtain CBC with differential. One to two week follow-up scheduled. Will call patient with results. Patient to call immediately with any questions or concerns.

## 2019-04-25 ENCOUNTER — OFFICE VISIT (OUTPATIENT)
Dept: UROLOGY | Age: 74
End: 2019-04-25
Payer: MEDICARE

## 2019-04-25 VITALS
DIASTOLIC BLOOD PRESSURE: 78 MMHG | HEIGHT: 65 IN | SYSTOLIC BLOOD PRESSURE: 126 MMHG | WEIGHT: 227 LBS | BODY MASS INDEX: 37.82 KG/M2

## 2019-04-25 DIAGNOSIS — B37.9 CANDIDIASIS: ICD-10-CM

## 2019-04-25 DIAGNOSIS — R31.29 MICROSCOPIC HEMATURIA: ICD-10-CM

## 2019-04-25 DIAGNOSIS — I88.9 LYMPHADENITIS: ICD-10-CM

## 2019-04-25 DIAGNOSIS — C64.2 RENAL CELL CARCINOMA OF LEFT KIDNEY (HCC): Primary | ICD-10-CM

## 2019-04-25 LAB
BILIRUBIN URINE: NEGATIVE
BLOOD URINE, POC: ABNORMAL
CHARACTER, URINE: CLEAR
COLOR, URINE: YELLOW
GLUCOSE URINE: NEGATIVE MG/DL
KETONES, URINE: NEGATIVE
LEUKOCYTE CLUMPS, URINE: ABNORMAL
NITRITE, URINE: NEGATIVE
PH, URINE: 7 (ref 5–9)
PROTEIN, URINE: ABNORMAL MG/DL
SPECIFIC GRAVITY, URINE: 1.02 (ref 1–1.03)
UROBILINOGEN, URINE: 0.2 EU/DL (ref 0–1)

## 2019-04-25 PROCEDURE — 99213 OFFICE O/P EST LOW 20 MIN: CPT | Performed by: PHYSICIAN ASSISTANT

## 2019-04-25 PROCEDURE — 81003 URINALYSIS AUTO W/O SCOPE: CPT | Performed by: PHYSICIAN ASSISTANT

## 2019-04-25 RX ORDER — AMOXICILLIN AND CLAVULANATE POTASSIUM 875; 125 MG/1; MG/1
1 TABLET, FILM COATED ORAL 2 TIMES DAILY
Qty: 14 TABLET | Refills: 0 | Status: SHIPPED | OUTPATIENT
Start: 2019-04-25 | End: 2019-05-02

## 2019-04-25 RX ORDER — FLUCONAZOLE 150 MG/1
150 TABLET ORAL ONCE
Qty: 2 TABLET | Refills: 0 | Status: SHIPPED | OUTPATIENT
Start: 2019-04-25 | End: 2019-04-25

## 2019-04-25 RX ORDER — DOXYCYCLINE HYCLATE 100 MG/1
100 CAPSULE ORAL 2 TIMES DAILY
Qty: 20 CAPSULE | Refills: 0 | Status: SHIPPED | OUTPATIENT
Start: 2019-04-25 | End: 2019-05-05

## 2019-04-25 NOTE — PROGRESS NOTES
Patient was seen today in the office. She has a history of RCC, s/p partial left nephrectomy. She had Cx bladder sent in December which came back elevated. She has microscopic hematuria today on urine dip. Cx bladder will be sent due to elevation and microhematuria.      Electronically signed by NASRIN Clmeents CNP on 4/25/2019 at 12:04 PM

## 2019-04-26 NOTE — PROGRESS NOTES
I have reviewed the patients chart and Nadja Benavides has discussed relevant portions with me. I agree with the assessment and plan.

## 2019-04-27 ENCOUNTER — TELEPHONE (OUTPATIENT)
Dept: UROLOGY | Age: 74
End: 2019-04-27

## 2019-04-27 NOTE — TELEPHONE ENCOUNTER
It appears that Ms. Marilynn Alpers CT neck and chest are scheduled on 5/11. Was this the soonest she could get in? I need her imaging to be performed before her 5/9 appointment. Is this possible? If not, her appointment with me will need to be rescheduled.

## 2019-05-01 LAB
ABSOLUTE BASO #: 0.1 K/UL (ref 0–0.1)
ABSOLUTE EOS #: 0.2 K/UL (ref 0.1–0.4)
ABSOLUTE LYMPH #: 2.8 K/UL (ref 0.8–5.2)
ABSOLUTE MONO #: 0.7 K/UL (ref 0.1–0.9)
ABSOLUTE NEUT #: 6.3 K/UL (ref 1.3–9.1)
BASOPHILS RELATIVE PERCENT: 0.5 %
EOSINOPHILS RELATIVE PERCENT: 1.6 %
HCT VFR BLD CALC: 36.2 % (ref 36–48)
HEMOGLOBIN: 11.9 G/DL (ref 12–16)
LYMPHOCYTE %: 27.8 %
MCH RBC QN AUTO: 28.3 PG (ref 27–34)
MCHC RBC AUTO-ENTMCNC: 32.9 G/DL (ref 31–36)
MCV RBC AUTO: 86 FL (ref 80–100)
MONOCYTES # BLD: 6.6 %
NEUTROPHILS RELATIVE PERCENT: 63 %
PDW BLD-RTO: 14.7 % (ref 10.8–14.8)
PLATELETS: 279 K/UL (ref 150–450)
RBC: 4.21 M/UL (ref 4–5.5)
WBC: 10.1 K/UL (ref 3.7–10.8)

## 2019-05-11 ENCOUNTER — HOSPITAL ENCOUNTER (OUTPATIENT)
Dept: CT IMAGING | Age: 74
Discharge: HOME | End: 2019-05-11

## 2019-05-11 ENCOUNTER — HOSPITAL ENCOUNTER (OUTPATIENT)
Dept: CT IMAGING | Age: 74
Discharge: HOME | End: 2019-05-11
Payer: MEDICARE

## 2019-05-11 DIAGNOSIS — I88.9 LYMPHADENITIS: ICD-10-CM

## 2019-05-11 DIAGNOSIS — C34.12 CANCER OF BRONCHUS OF LEFT UPPER LOBE (HCC): ICD-10-CM

## 2019-05-11 PROCEDURE — 6360000004 HC RX CONTRAST MEDICATION: Performed by: PHYSICIAN ASSISTANT

## 2019-05-11 PROCEDURE — 70491 CT SOFT TISSUE NECK W/DYE: CPT

## 2019-05-11 PROCEDURE — 71260 CT THORAX DX C+: CPT

## 2019-05-11 RX ADMIN — IOPAMIDOL 85 ML: 755 INJECTION, SOLUTION INTRAVENOUS at 09:33

## 2019-05-14 ENCOUNTER — TELEPHONE (OUTPATIENT)
Dept: UROLOGY | Age: 74
End: 2019-05-14

## 2019-05-14 DIAGNOSIS — E04.1 THYROID NODULE: Primary | ICD-10-CM

## 2019-05-14 NOTE — TELEPHONE ENCOUNTER
Patient called back . Said she missed your call. In regards to some test that she had run. Please call her back.  Thank you

## 2019-05-14 NOTE — TELEPHONE ENCOUNTER
Left message for patient to call office back for imaging results. Patient had recent CT scans of the soft tissue neck and chest due to left neck swelling and lymphadenopathy. Office staff may inform patient of the following results. Results as follows:    1. CT chest did not reveal any lymphadenopathy. There is some indeterminate changes in the right piriformis sinus (located close to larynx). Recommend ENT evaluation for direct visualization of this area. 2. CT of soft tissue neck did not reveal lymphadenopathy but revealed a density within the left thyroid lobe measuring 1.8 cm. Thyroid ultrasound recommended for further delineation. Will call patient with results. 3. Her Bladder Cx testing was negative. No indication of urothelial carcinoma at this time. Recommend repeat Bladder Cx testing in six months. Please ask patient how she is feeling and whether her left neck swelling and discomfort have subsided.

## 2019-05-15 ENCOUNTER — TELEPHONE (OUTPATIENT)
Dept: UROLOGY | Age: 74
End: 2019-05-15

## 2019-05-15 DIAGNOSIS — R13.10 ABNORMAL SWALLOWING: ICD-10-CM

## 2019-05-15 DIAGNOSIS — R13.19 OTHER DYSPHAGIA: Primary | ICD-10-CM

## 2019-05-15 NOTE — TELEPHONE ENCOUNTER
The patient states she still has some pain on the side of her neck that comes and goes rated as 2 on scale 0-10. The edema has remained unchanged. Her voice is raspy which she said is not new. She does have swallowing problems every once in awhile. She states she only gets SOB when she over exerts herself. She would like the referral to the ENT. She stated she was already scheduled for the [facility]. Please make the referral to ENT. Thank you.

## 2019-05-16 ENCOUNTER — TELEPHONE (OUTPATIENT)
Dept: ENT CLINIC | Age: 74
End: 2019-05-16

## 2019-05-20 ENCOUNTER — APPOINTMENT (OUTPATIENT)
Dept: GENERAL RADIOLOGY | Age: 74
End: 2019-05-20
Payer: MEDICARE

## 2019-05-20 ENCOUNTER — HOSPITAL ENCOUNTER (EMERGENCY)
Age: 74
Discharge: HOME | End: 2019-05-20
Attending: EMERGENCY MEDICINE
Payer: MEDICARE

## 2019-05-20 ENCOUNTER — APPOINTMENT (OUTPATIENT)
Dept: CT IMAGING | Age: 74
End: 2019-05-20
Payer: MEDICARE

## 2019-05-20 VITALS
OXYGEN SATURATION: 94 % | HEIGHT: 65 IN | HEART RATE: 64 BPM | SYSTOLIC BLOOD PRESSURE: 140 MMHG | RESPIRATION RATE: 18 BRPM | TEMPERATURE: 97.9 F | BODY MASS INDEX: 37.82 KG/M2 | WEIGHT: 227 LBS | DIASTOLIC BLOOD PRESSURE: 71 MMHG

## 2019-05-20 DIAGNOSIS — R22.1 NECK SWELLING: ICD-10-CM

## 2019-05-20 DIAGNOSIS — M54.2 NECK PAIN: Primary | ICD-10-CM

## 2019-05-20 LAB
ALBUMIN SERPL-MCNC: 4 G/DL (ref 3.5–5.1)
ALP BLD-CCNC: 122 U/L (ref 38–126)
ALT SERPL-CCNC: 7 U/L (ref 11–66)
ANION GAP SERPL CALCULATED.3IONS-SCNC: 14 MEQ/L (ref 8–16)
AST SERPL-CCNC: 10 U/L (ref 5–40)
BASOPHILS # BLD: 0.6 %
BASOPHILS ABSOLUTE: 0.1 THOU/MM3 (ref 0–0.1)
BILIRUB SERPL-MCNC: 0.3 MG/DL (ref 0.3–1.2)
BILIRUBIN DIRECT: < 0.2 MG/DL (ref 0–0.3)
BUN BLDV-MCNC: 11 MG/DL (ref 7–22)
CALCIUM SERPL-MCNC: 9.1 MG/DL (ref 8.5–10.5)
CHLORIDE BLD-SCNC: 104 MEQ/L (ref 98–111)
CO2: 25 MEQ/L (ref 23–33)
CREAT SERPL-MCNC: 0.9 MG/DL (ref 0.4–1.2)
EKG ATRIAL RATE: 74 BPM
EKG P AXIS: 51 DEGREES
EKG P-R INTERVAL: 192 MS
EKG Q-T INTERVAL: 420 MS
EKG QRS DURATION: 84 MS
EKG QTC CALCULATION (BAZETT): 466 MS
EKG R AXIS: -11 DEGREES
EKG T AXIS: 17 DEGREES
EKG VENTRICULAR RATE: 74 BPM
EOSINOPHIL # BLD: 1.7 %
EOSINOPHILS ABSOLUTE: 0.2 THOU/MM3 (ref 0–0.4)
ERYTHROCYTE [DISTWIDTH] IN BLOOD BY AUTOMATED COUNT: 15.1 % (ref 11.5–14.5)
ERYTHROCYTE [DISTWIDTH] IN BLOOD BY AUTOMATED COUNT: 48.4 FL (ref 35–45)
GFR SERPL CREATININE-BSD FRML MDRD: 61 ML/MIN/1.73M2
GLUCOSE BLD-MCNC: 135 MG/DL (ref 70–108)
HCT VFR BLD CALC: 39.1 % (ref 37–47)
HEMOGLOBIN: 12.6 GM/DL (ref 12–16)
IMMATURE GRANS (ABS): 0.06 THOU/MM3 (ref 0–0.07)
IMMATURE GRANULOCYTES: 0.5 %
LIPASE: 15.7 U/L (ref 5.6–51.3)
LYMPHOCYTES # BLD: 20.5 %
LYMPHOCYTES ABSOLUTE: 2.4 THOU/MM3 (ref 1–4.8)
MAGNESIUM: 2.1 MG/DL (ref 1.6–2.4)
MCH RBC QN AUTO: 28.4 PG (ref 26–33)
MCHC RBC AUTO-ENTMCNC: 32.2 GM/DL (ref 32.2–35.5)
MCV RBC AUTO: 88.3 FL (ref 81–99)
MONOCYTES # BLD: 7.3 %
MONOCYTES ABSOLUTE: 0.8 THOU/MM3 (ref 0.4–1.3)
NUCLEATED RED BLOOD CELLS: 0 /100 WBC
OSMOLALITY CALCULATION: 286.4 MOSMOL/KG (ref 275–300)
PLATELET # BLD: 286 THOU/MM3 (ref 130–400)
PMV BLD AUTO: 10.4 FL (ref 9.4–12.4)
POTASSIUM SERPL-SCNC: 3.7 MEQ/L (ref 3.5–5.2)
PRO-BNP: 1019 PG/ML (ref 0–900)
RBC # BLD: 4.43 MILL/MM3 (ref 4.2–5.4)
SEG NEUTROPHILS: 69.4 %
SEGMENTED NEUTROPHILS ABSOLUTE COUNT: 8.1 THOU/MM3 (ref 1.8–7.7)
SODIUM BLD-SCNC: 143 MEQ/L (ref 135–145)
TOTAL PROTEIN: 7 G/DL (ref 6.1–8)
TROPONIN T: < 0.01 NG/ML
TSH SERPL DL<=0.05 MIU/L-ACNC: 6.28 UIU/ML (ref 0.4–4.2)
WBC # BLD: 11.6 THOU/MM3 (ref 4.8–10.8)

## 2019-05-20 PROCEDURE — 70491 CT SOFT TISSUE NECK W/DYE: CPT

## 2019-05-20 PROCEDURE — 83880 ASSAY OF NATRIURETIC PEPTIDE: CPT

## 2019-05-20 PROCEDURE — 85025 COMPLETE CBC W/AUTO DIFF WBC: CPT

## 2019-05-20 PROCEDURE — 82248 BILIRUBIN DIRECT: CPT

## 2019-05-20 PROCEDURE — 84443 ASSAY THYROID STIM HORMONE: CPT

## 2019-05-20 PROCEDURE — 93010 ELECTROCARDIOGRAM REPORT: CPT | Performed by: INTERNAL MEDICINE

## 2019-05-20 PROCEDURE — 76376 3D RENDER W/INTRP POSTPROCES: CPT

## 2019-05-20 PROCEDURE — 93005 ELECTROCARDIOGRAM TRACING: CPT | Performed by: EMERGENCY MEDICINE

## 2019-05-20 PROCEDURE — 96374 THER/PROPH/DIAG INJ IV PUSH: CPT

## 2019-05-20 PROCEDURE — 36415 COLL VENOUS BLD VENIPUNCTURE: CPT

## 2019-05-20 PROCEDURE — 83735 ASSAY OF MAGNESIUM: CPT

## 2019-05-20 PROCEDURE — 6370000000 HC RX 637 (ALT 250 FOR IP): Performed by: EMERGENCY MEDICINE

## 2019-05-20 PROCEDURE — 6370000000 HC RX 637 (ALT 250 FOR IP): Performed by: INTERNAL MEDICINE

## 2019-05-20 PROCEDURE — 83690 ASSAY OF LIPASE: CPT

## 2019-05-20 PROCEDURE — 99284 EMERGENCY DEPT VISIT MOD MDM: CPT

## 2019-05-20 PROCEDURE — 80053 COMPREHEN METABOLIC PANEL: CPT

## 2019-05-20 PROCEDURE — 6360000002 HC RX W HCPCS: Performed by: INTERNAL MEDICINE

## 2019-05-20 PROCEDURE — 71046 X-RAY EXAM CHEST 2 VIEWS: CPT

## 2019-05-20 PROCEDURE — 6360000004 HC RX CONTRAST MEDICATION: Performed by: EMERGENCY MEDICINE

## 2019-05-20 PROCEDURE — 84484 ASSAY OF TROPONIN QUANT: CPT

## 2019-05-20 RX ORDER — OXYBUTYNIN CHLORIDE 5 MG/1
5 TABLET, EXTENDED RELEASE ORAL DAILY
COMMUNITY
End: 2022-10-17

## 2019-05-20 RX ORDER — CYCLOBENZAPRINE HCL 5 MG
5 TABLET ORAL 2 TIMES DAILY PRN
Qty: 30 TABLET | Refills: 0 | Status: SHIPPED | OUTPATIENT
Start: 2019-05-20 | End: 2019-05-30

## 2019-05-20 RX ORDER — CYCLOBENZAPRINE HCL 10 MG
5 TABLET ORAL ONCE
Status: COMPLETED | OUTPATIENT
Start: 2019-05-20 | End: 2019-05-20

## 2019-05-20 RX ORDER — ONDANSETRON 4 MG/1
4 TABLET, ORALLY DISINTEGRATING ORAL ONCE
Status: COMPLETED | OUTPATIENT
Start: 2019-05-20 | End: 2019-05-20

## 2019-05-20 RX ORDER — KETOROLAC TROMETHAMINE 30 MG/ML
15 INJECTION, SOLUTION INTRAMUSCULAR; INTRAVENOUS ONCE
Status: COMPLETED | OUTPATIENT
Start: 2019-05-20 | End: 2019-05-20

## 2019-05-20 RX ORDER — HYDROCODONE BITARTRATE AND ACETAMINOPHEN 5; 325 MG/1; MG/1
1 TABLET ORAL ONCE
Status: COMPLETED | OUTPATIENT
Start: 2019-05-20 | End: 2019-05-20

## 2019-05-20 RX ADMIN — IOPAMIDOL 80 ML: 755 INJECTION, SOLUTION INTRAVENOUS at 07:34

## 2019-05-20 RX ADMIN — ONDANSETRON 4 MG: 4 TABLET, ORALLY DISINTEGRATING ORAL at 07:04

## 2019-05-20 RX ADMIN — KETOROLAC TROMETHAMINE 15 MG: 30 INJECTION, SOLUTION INTRAMUSCULAR at 09:17

## 2019-05-20 RX ADMIN — HYDROCODONE BITARTRATE AND ACETAMINOPHEN 1 TABLET: 5; 325 TABLET ORAL at 07:03

## 2019-05-20 RX ADMIN — CYCLOBENZAPRINE HYDROCHLORIDE 5 MG: 10 TABLET, FILM COATED ORAL at 08:21

## 2019-05-20 ASSESSMENT — ENCOUNTER SYMPTOMS
CHEST TIGHTNESS: 0
TROUBLE SWALLOWING: 0
EYE PAIN: 0
CHOKING: 0
EYE REDNESS: 0
SHORTNESS OF BREATH: 0
ABDOMINAL PAIN: 0
WHEEZING: 0
BACK PAIN: 0
PHOTOPHOBIA: 0
COUGH: 0
RHINORRHEA: 0
SORE THROAT: 0
EYE ITCHING: 0
SINUS PRESSURE: 0
EYE DISCHARGE: 0
NAUSEA: 0
BLOOD IN STOOL: 0
ABDOMINAL DISTENTION: 0
VOMITING: 0
VOICE CHANGE: 0
CONSTIPATION: 0
DIARRHEA: 0

## 2019-05-20 ASSESSMENT — PAIN SCALES - GENERAL
PAINLEVEL_OUTOF10: 2
PAINLEVEL_OUTOF10: 2
PAINLEVEL_OUTOF10: 7
PAINLEVEL_OUTOF10: 6

## 2019-05-20 ASSESSMENT — PAIN DESCRIPTION - PROGRESSION: CLINICAL_PROGRESSION: GRADUALLY WORSENING

## 2019-05-20 ASSESSMENT — PAIN DESCRIPTION - PAIN TYPE
TYPE: ACUTE PAIN
TYPE: ACUTE PAIN

## 2019-05-20 ASSESSMENT — PAIN DESCRIPTION - FREQUENCY
FREQUENCY: INTERMITTENT
FREQUENCY: INTERMITTENT

## 2019-05-20 ASSESSMENT — PAIN DESCRIPTION - ORIENTATION
ORIENTATION: LEFT
ORIENTATION: LEFT

## 2019-05-20 ASSESSMENT — PAIN DESCRIPTION - ONSET: ONSET: ON-GOING

## 2019-05-20 ASSESSMENT — PAIN DESCRIPTION - LOCATION
LOCATION: NECK;SHOULDER
LOCATION: NECK;SHOULDER

## 2019-05-20 ASSESSMENT — PAIN DESCRIPTION - DESCRIPTORS: DESCRIPTORS: ACHING

## 2019-05-20 NOTE — LETTER
[facility]   [place], [address]          PROOF OF PRESENCE      To Whom It May Concern:    Brayden Manning was present in the Emergency Department at [facility] on 5/20/2019.                                      Sincerely,        Selma Willams RN

## 2019-05-20 NOTE — ED NOTES
Patient presents to the ED with complaints of left sided neck swelling and left sided shoulder pain. Patient states that her pain and swelling has been ongoing for the past day. Patient states that her pain is better while at rest and states that her pain comes and goes. She denies any chest pain or shortness of breath. She does mention that she was started on oxybutynin and doxepin last week. Patient's respirations are easy and unlabored. Will monitor.       Kate Trevizo RN  05/20/19 [id number]

## 2019-05-20 NOTE — ED PROVIDER NOTES
. Patient's care was assumed at the start of my shift from Dr. Luc Clemons. On my examination it looks like patient has a muscle spasm which looked like swelling on her left  Side of the neck. Patient's CT scan results were reviewed. After that. Patient was given Flexeril and Toradol and patient's pain and swelling went down. Patient's range of motion. Movement also improved. Patient will be discharged home on Flexeril and diclofenac gel. Patient is advised to follow-up with her primary care physician. She was also advised to come back to the emergency department if her symptoms get worse. Labs  Labs Reviewed   CBC WITH AUTO DIFFERENTIAL - Abnormal; Notable for the following components:       Result Value    WBC 11.6 (*)     RDW-CV 15.1 (*)     RDW-SD 48.4 (*)     Segs Absolute 8.1 (*)     All other components within normal limits   BRAIN NATRIURETIC PEPTIDE - Abnormal; Notable for the following components:    Pro-BNP 1019.0 (*)     All other components within normal limits   BASIC METABOLIC PANEL - Abnormal; Notable for the following components:    Glucose 135 (*)     All other components within normal limits   HEPATIC FUNCTION PANEL - Abnormal; Notable for the following components:    ALT 7 (*)     All other components within normal limits   TSH WITHOUT REFLEX - Abnormal; Notable for the following components:    TSH 6.280 (*)     All other components within normal limits   GLOMERULAR FILTRATION RATE, ESTIMATED - Abnormal; Notable for the following components:    Est, Glom Filt Rate 61 (*)     All other components within normal limits   LIPASE   TROPONIN   MAGNESIUM   ANION GAP   OSMOLALITY      Radiology  Xr Chest Standard (2 Vw)    Result Date: 5/20/2019  PROCEDURE: XR CHEST (2 VW) CLINICAL INFORMATION: Cough shortness of breath. COMPARISON: Chest x-ray dated 4/12/2019 TECHNIQUE: PA and lateral views of the chest. FINDINGS: Lungs/pleura: No pneumonia, pulmonary edema, or obvious mass.  There is right middle lobe scarring. There is mild lingular atelectasis. No pleural effusion. No pneumothorax. Heart: There is stable mild cardiomegaly. Mediastinum/cong: No obvious mass or adenopathy. Skeleton: No significant bone or joint abnormality. Upper abdomen: There are cholecystectomy clips in the right upper quadrant. No acute pneumonia. Right middle lobe scarring. Mild lingular atelectasis. Mild cardiomegaly. **This report has been created using voice recognition software. It may contain minor errors which are inherent in voice recognition technology. ** Final report electronically signed by Dr. Jessica Martínez on 5/20/2019 5:42 AM    Ct Soft Tissue Neck W Contrast    Result Date: 5/20/2019  PROCEDURE: CT SOFT TISSUE NECK W CONTRAST CLINICAL INFORMATION: Left anterior neck swelling, onto supraclavicular region. Left-sided neck swelling. Left shoulder pain. Symptoms for one day. Pain is intermittent. COMPARISON: CT soft tissue neck 5/11/2019. TECHNIQUE: 3 mm axial images were obtained through the neck soft tissues after the administration of intravenous contrast. Sagittal and coronal reconstructions were obtained. All CT scans at this facility use dose modulation, iterative reconstruction, and/or weight-based dosing when appropriate to reduce radiation dose to as low as reasonably achievable. FINDINGS: The soft tissues of the nasopharynx are normal.   The mucosal tissues of the oropharynx are within appropriate limits. The internal carotid arteries deviate medially bilaterally. Just deep to the mucosa of the oropharynx. The hypopharynx is normal. The epiglottis is normal.  The oral cavity and floor of mouth are normal.  As seen previously, the left vocal cord is medialized. This can indicate vocal cord paralysis. The subglottic airway is normal. There is a low-density lesion in the left thyroid gland. This was also noted previously. The submandibular and parotid glands are within acceptable limits.  There is no cervical are within normal limits. The right thyroid lobe is noted to extend posteriorly and contains a small hypodensity. A heterogeneous hypodensity is present within the left thyroid lobe measuring 1.8 cm. No abnormal lymph node is identified within the neck. The visualized orbits and intracranial structures are grossly unremarkable. Atherosclerotic vascular calcifications are noted at the skull base. Note is made that the bilateral carotid arteries and carotid bifurcations take a medial, retropharyngeal course. Atherosclerotic vascular calcifications are also present within the neck. Multilevel degenerative changes are present throughout the cervical spine and this results in mild to moderate spinal canal narrowing at C5-6. No suspicious osseous lesion is identified. The visualized lung apices are unremarkable. Please also refer to the separately dictated CT of the chest performed on the same date. 1. There is medialization of the right true vocal fold. This is suggestive of right vocal cord paralysis. 2. A heterogeneous hypodensity is present within the left thyroid lobe which measures 1.8 cm. Further evaluation with a dedicated thyroid ultrasound is recommended. **This report has been created using voice recognition software. It may contain minor errors which are inherent in voice recognition technology. ** Final report electronically signed by Dr. Miguelito Willard on 5/11/2019 12:53 PM    Ct Chest W Contrast    Result Date: 5/11/2019  PROCEDURE: CT CHEST no lymphadenopathy is seen. Lymphadenitis COMPARISON: 4/12/2019 TECHNIQUE:  Axial CT images were obtained through the chest following the intravenous administration of 85 mL Isovue 370 contrast. Coronal and sagittal reformatted images were rendered. All CT scans at this facility use dose modulation, iterative reconstruction, and/or weight-based dosing when appropriate to reduce radiation dose to as low as reasonably achievable.  FINDINGS: The central airways appear patent. Mild parenchymal bandlike atelectasis or scarring is noted within the anterior right middle lobe. No pleural effusion or pneumothorax is seen. There is mild parenchymal bandlike scarring or atelectasis at the left lung base. There is a small to moderate hiatal hernia. No acute osseous findings are demonstrated. Low-density lesions within the left greater the right thyroid are again noted. No supraclavicular, axillary, mediastinal, or hilar lymphadenopathy is seen. There is effacement of the right piriform sinus of indeterminate etiology. 1. No lymphadenopathy is seen. 2. There is indeterminate in effacement of the right piriform sinus. If clinically indicated, further characterization can be obtained with direct visualization. 3. Hypoechoic nodules within the thyroid gland are incompletely characterized on the current CT examination. If clinically indicated, further characterization could be obtained with nonemergent thyroid ultrasound. 4. No other acute intrathoracic findings. Chronic changes otherwise as above. **This report has been created using voice recognition software. It may contain minor errors which are inherent in voice recognition technology. ** Final report electronically signed by Dr. Herbert Hall on 5/11/2019 1:13 PM     diagnosis  Neck pain       Disposition   discharge     Wen Daniel MD  05/20/19 0900

## 2019-05-20 NOTE — ED PROVIDER NOTES
[facility]  eMERGENCY dEPARTMENT eNCOUnter          CHIEF COMPLAINT       Chief Complaint   Patient presents with    Neck Swelling       Nurses Notes reviewed and I agreeexcept as noted in the HPI. HISTORY OF PRESENT ILLNESS    Foster Cortez is a 69 y.o. female who presents to the Emergency Department for the evaluation of neck swelling. The patient states that her symptoms started a couple weeks ago for the evaluation and has been worsening for the past several days. The patient states that this morning her pain worsened when she rolled over in her sleep the patient states that her pain worsened with moving her neck. The patient states that she has a CT of her thyroid scheduled for next week. She became concerned when her swelling started to spread down into her left shoulder. The patient admits fatigue. The patient denies any tongue swelling, difficulty swallowing, fever, chills, diaphoresis or any other signs or symptoms at this time. The patient states that she has started new medications for incontinence and insomnia. The HPI was provided by the patient. REVIEW OF SYSTEMS      Review of Systems   Constitutional: Negative for activity change, appetite change, diaphoresis, fatigue and unexpected weight change. HENT: Negative for congestion, ear discharge, ear pain, hearing loss, rhinorrhea, sinus pressure, sore throat, trouble swallowing and voice change. Eyes: Negative for photophobia, pain, discharge, redness and itching. Respiratory: Negative for cough, choking, chest tightness, shortness of breath and wheezing. Cardiovascular: Negative for chest pain, palpitations and leg swelling. Gastrointestinal: Negative for abdominal distention, abdominal pain, blood in stool, constipation, diarrhea, nausea and vomiting. Endocrine: Negative for polydipsia, polyphagia and polyuria.    Genitourinary: Negative for decreased urine volume, difficulty urinating, dysuria, enuresis, Elastic Bandages & Supports (JOBST KNEE HIGH COMPRESSION SM) MISC DAILY PRN Starting 2019, Disp-1 each, R-0, Print      doxepin (SINEQUAN) 10 MG capsule Take 1 capsule by mouth nightly, Disp-30 capsule, R-3Normal      acetaminophen (TYLENOL) 500 MG tablet Take 500 mg by mouth every 6 hours as needed for PainHistorical Med      simvastatin (ZOCOR) 40 MG tablet Take 1 tablet by mouth nightly, Disp-90 tablet, R-3Normal      ranitidine (ZANTAC) 150 MG capsule Take 1 capsule by mouth 2 times daily, Disp-180 capsule, R-3Normal      citalopram (CELEXA) 20 MG tablet Take 1 tablet by mouth 2 times daily, Disp-180 tablet, R-3Normal      propranolol (INDERAL LA) 120 MG extended release capsule Take 1 capsule by mouth daily, Disp-90 capsule, R-3Normal      budesonide-formoterol (SYMBICORT) 160-4.5 MCG/ACT AERO Inhale 2 puffs into the lungs 2 times daily, Disp-1 Inhaler, R-11Normal      calcium carbonate (OSCAL) 500 MG TABS tablet Take 500 mg by mouth dailyHistorical Med      nitroGLYCERIN (NITROSTAT) 0.4 MG SL tablet Place 1 tablet under the tongue every 5 minutes as needed for Chest pain, Disp-25 tablet, R-1Normal      Cholecalciferol (VITAMIN D3) 5000 units TABS Take by mouth daily Historical Med      aspirin 81 MG tablet Take 81 mg by mouth daily      fexofenadine (ALLEGRA) 180 MG tablet Take 180 mg by mouth daily. Multiple Vitamin (MULTI-VITAMINS PO) Take  by mouth daily. ALLERGIES     is allergic to latex; demerol hcl [meperidine]; propoxyphene; and tape [adhesive tape]. FAMILY HISTORY     indicated that her mother is . She indicated that her father is . She indicated that the status of her sister is unknown.   family history includes Cancer in her father, mother, and sister; Heart Disease in her father and mother; High Blood Pressure in her father and mother; High Cholesterol in her father and mother; Stroke in her mother.     SOCIAL HISTORY    reports that she has never smoked. She has never used smokeless tobacco. She reports that she does not drink alcohol or use drugs. PHYSICAL EXAM     INITIAL VITALS:  height is 5' 5\" (1.651 m) and weight is 227 lb (103 kg). Her oral temperature is 97.9 °F (36.6 °C). Her blood pressure is 140/71 (abnormal) and her pulse is 64. Her respiration is 18 and oxygen saturation is 94%. Physical Exam   Constitutional: She is oriented to person, place, and time. She appears well-developed and well-nourished. No distress. HENT:   Head: Normocephalic and atraumatic. Right Ear: External ear normal.   Left Ear: External ear normal.   Nose: Nose normal.   Mouth/Throat: Oropharynx is clear and moist. No oropharyngeal exudate. Eyes: Pupils are equal, round, and reactive to light. Conjunctivae and EOM are normal. Right eye exhibits no discharge. Left eye exhibits no discharge. No scleral icterus. Neck: Normal range of motion. Neck supple. No JVD present. Carotid bruit is not present. No tracheal deviation present. Swelling to the left supraclavicular area   Cardiovascular: Normal rate, regular rhythm, normal heart sounds and intact distal pulses. Exam reveals no gallop and no friction rub. No murmur heard. Pulmonary/Chest: Effort normal and breath sounds normal. No stridor. She has no wheezes. She has no rales. Abdominal: Soft. Bowel sounds are normal. She exhibits no mass. There is no tenderness. There is no rebound and no guarding. Musculoskeletal: Normal range of motion. She exhibits no edema or tenderness. Lymphadenopathy:     She has no cervical adenopathy. Neurological: She is alert and oriented to person, place, and time. She has normal reflexes. She displays normal reflexes. No cranial nerve deficit. She exhibits normal muscle tone. Coordination normal.   Skin: Skin is warm and dry. No rash noted. She is not diaphoretic. Psychiatric: She has a normal mood and affect.  Her behavior is normal. Judgment and thought content normal.   Nursing note and vitals reviewed. DIFFERENTIAL DIAGNOSIS:   Differential diagnoses were discussed extensively with the patient and family including but no limited to thyroid dysfunction,  Cervical strain, ACS, atypical chest pain    DIAGNOSTIC RESULTS     EKG: All EKG's are interpreted by the Emergency Department Physician who either signs or Co-signs this chart in the absence of a cardiologist.  EKG interpreted by Javier Adler DO:    EKG read and interpreted by myself with comparison to 04/12/2019 gives impression of normal sinus rhythm with heart rate of 74; interval 192; QRS 84;QTc 466; axis 51 -11 17. No STEMI         RADIOLOGY: non-plain film images(s) such as CT, Ultrasound and MRI are read by the radiologist.    CT SOFT TISSUE NECK W CONTRAST   Final Result       1. Left-sided facet degenerative changes in the cervical spine. 2. The carotid bulbs and internal carotid arteries deviate medially bilaterally deep to the mucosal surfaces of the oropharynx. 3. Medialized left vocal cord. This can indicate vocal cord paralysis. **This report has been created using voice recognition software. It may contain minor errors which are inherent in voice recognition technology. **      Final report electronically signed by Dr. Forest Pritchard on 5/20/2019 8:03 AM      CT RECONSTRUCTION WO POST PROCESS   Final Result       1. No acute findings in the cervical spine. 2. Mild to moderate spinal canal stenosis with mild bilateral foraminal stenosis at the C5-6 level due to a combination of degenerative changes. 3. Mild spinal canal stenosis at the C4-5 and C6-7 levels. **This report has been created using voice recognition software. It may contain minor errors which are inherent in voice recognition technology. **      Final report electronically signed by Dr. Forest Pritchard on 5/20/2019 1:17 PM      XR CHEST STANDARD (2 VW)   Final Result      No acute pneumonia.    Right middle quite some time. She is able to breathe without any difficulty. Labs were encouraging. CT scans are still pending. At this point I came to the end of my shift. Patient is signed out to my morning colleague in stable condition. CRITICALCARE:   None     CONSULTS:  None    PROCEDURES:  None    FINAL IMPRESSION      1. Neck pain    2. Neck swelling          DISPOSITION/PLAN   ED observation/sign out    PATIENT REFERRED TO:  Eric Francois MD  [address]  [phone]    Call today      [address] [facility] [place] [address] [address] EMERGENCY DEPT  [address]  Go in 1 day  If symptoms worsen      DISCHARGE MEDICATIONS:  Discharge Medication List as of 5/20/2019  9:42 AM      START taking these medications    Details   cyclobenzaprine (FLEXERIL) 5 MG tablet Take 1 tablet by mouth 2 times daily as needed for Muscle spasms, Disp-30 tablet, R-0Print      diclofenac sodium 1 % GEL Apply 2 g topically 4 times daily, Topical, 4 TIMES DAILY Starting Mon 5/20/2019, Disp-2 Tube, R-1, Print             (Please note that portions of this note were completed with a voice recognition program.  Efforts weremade to edit the dictations but occasionally words are mis-transcribed.)    Scribe:  Piper Carbajal 5/20/19 5:19 AM Scribing for and in the presence ofIsmael Matos DO. Scribe: Piper Carbajal 5/20/19 5:19 AM    Provider:  I personally performed the services described in the documentation, reviewed and edited the documentation which was dictated to the scribe inmy presence, and it accurately records my words and actions.     Misael Sherman DO 5/20/19 5:53 AM      Misael Sherman DO  05/21/19 [id number]

## 2019-05-20 NOTE — ED NOTES
Patient resting quietly in cot showing no signs of distress at this time. Family at bedside. Rates pain 2/10. Medicated per MAR. Updated on POC. Will continue to monitor.       Yoly Up RN  05/20/19 2194

## 2019-05-20 NOTE — ED NOTES
Pt reassessed. IV established. Resting on cot, respirations even and unlabored. Pt asking for ice chips, will ask provider. Updated on POC, no complaints. SR up x2, call light in reach, telemetry continued, family at bedside, will continue to monitor.      Pedro Merrill RN  05/20/19 0179

## 2019-05-20 NOTE — CARE COORDINATION
ED Care Transition    2019    Patient Name: Phil Seen   : 1945  MRN: [id number]    NELL Score: 3  PCP: Maudine Closs, MD   Specialist: yes - Dr. La Nena Huggins, Dr. Kandace Davila, Dr. Nemo Del Valle  Active ACC/CTC: no                       Utilization Review:  ED visits:  2  19 - Neck swelling  19- SOB, leg swelling   Admissions: 0    Problem List:  Patient Active Problem List   Diagnosis    Kidney tumor    Renal cell carcinoma of left kidney [facility])     Summary:  Met with Anali Mina and her family at bedside, introduced self/role. Presented to ED for evaluation of neck swelling extending to shoulder. Patient resides at home with spouse, independent, active , able to afford medications, understands medications, denies any DME use or any further needs/assistance at home. Patient has new appointment with Dr. La Nena Huggins on 19. Scheduled for US of thyroid on 19     Plan is for discharge. Patient advised to keep [address] appointment and appointment with Dr. La Nena Huggins. Advised to call PCP/ENT office sooner for any new or worsening symptoms, verbalized understanding. Denies further needs/assistance/concerns/questions at this time.      Follow up appointments:    Future Appointments   Date Time Provider Andrés Bernstein   2019  2:00 PM STR ULTRASOUND RM 2 STRZ US STR Radiolog   2019  9:15 AM [facility]ino Pennington [facility] [place] [address]   2019 10:15 AM Phyllis Coronado MD ENT [facility]ATE[facility] AM [address]   7/10/2019 10:15 AM Lou Rose MD SRPX Heart [facility] AM [address]   2019 10:00 AM STR CT IMAGING RM1  [facility] STRZ OUT EXP STR Radiolog   2019  9:15 AM Paulino Pennington [facility] AM [address] [address]       Review Due Health Maintenance:  Health Maintenance Due   Topic Date Due    DTaP/Tdap/Td vaccine (1 - Tdap) 1965    Shingles Vaccine (1 of 2) 1996    Diabetic retinal exam  10/20/2018     Darlin Larson RN, BSN  [phone]

## 2019-05-21 ENCOUNTER — TELEPHONE (OUTPATIENT)
Dept: UROLOGY | Age: 74
End: 2019-05-21

## 2019-05-21 NOTE — TELEPHONE ENCOUNTER
Thierry Truong is on the schedule for 5/23/19 to review abnormal Cx Bladder results, but it appears the results from 5/03/19 were normal. Would you still like her to come in, or just follow up for her yearly appointment in December? Please advise. Thank you.

## 2019-05-21 NOTE — TELEPHONE ENCOUNTER
Called and spoke with patient and she is agreeable to return for  follow up in December. 5/23 appointment cancelled.

## 2019-05-22 ENCOUNTER — HOSPITAL ENCOUNTER (OUTPATIENT)
Dept: ULTRASOUND IMAGING | Age: 74
Discharge: HOME | End: 2019-05-22
Payer: MEDICARE

## 2019-05-22 DIAGNOSIS — E04.1 THYROID NODULE: ICD-10-CM

## 2019-05-22 PROCEDURE — 76536 US EXAM OF HEAD AND NECK: CPT

## 2019-05-23 ENCOUNTER — TELEPHONE (OUTPATIENT)
Dept: FAMILY MEDICINE CLINIC | Age: 74
End: 2019-05-23

## 2019-05-23 NOTE — TELEPHONE ENCOUNTER
[facility] ([facility]) ED Follow up Call    Reason for ED visit:  Neck pain          Hi Renteria Rather , this is Rubye Fore  from Dr. Salima Portillo office, just calling to see how you are doing after your recent ED visit. Did you receive discharge instructions? Yes  Do you understand the discharge instructions? Yes  Did the ED give you any new prescriptions? Yes  Were you able to fill your prescriptions? Yes      Do you have one of our red, yellow and green  Zone sheets that help you to determine when you should go to the ED? Not Applicable      Do you need or want to make a follow up appt with your PCP? Yes    Do you have any further needs in the home i.e. Equipment?   No        FU appts/Provider:    Future Appointments   Date Time Provider Andrés Bernstein   5/24/2019 10:30 AM Fernando Crook MD [facility]   6/18/2019 10:15 AM Bentley Pettit MD ENT [facility] JO[address]H  [address]   7/10/2019 10:15 AM Live [facility]   12/12/2019 10:00 AM STR CT IMAGING RM1  [facility] STRZ OUT EXP STR Radiolog   12/19/2019  9:15 AM [facility]  [address] [address]

## 2019-05-24 ENCOUNTER — OFFICE VISIT (OUTPATIENT)
Dept: FAMILY MEDICINE CLINIC | Age: 74
End: 2019-05-24
Payer: MEDICARE

## 2019-05-24 VITALS
BODY MASS INDEX: 37.87 KG/M2 | SYSTOLIC BLOOD PRESSURE: 132 MMHG | HEART RATE: 60 BPM | RESPIRATION RATE: 12 BRPM | DIASTOLIC BLOOD PRESSURE: 74 MMHG | WEIGHT: 227.6 LBS

## 2019-05-24 DIAGNOSIS — R22.1 MASS OF LEFT SIDE OF NECK: Primary | ICD-10-CM

## 2019-05-24 DIAGNOSIS — B37.31 VAGINA, CANDIDIASIS: ICD-10-CM

## 2019-05-24 PROCEDURE — 99213 OFFICE O/P EST LOW 20 MIN: CPT | Performed by: FAMILY MEDICINE

## 2019-05-24 RX ORDER — FLUCONAZOLE 150 MG/1
TABLET ORAL
Qty: 2 TABLET | Refills: 0 | Status: SHIPPED | OUTPATIENT
Start: 2019-05-24 | End: 2019-05-25

## 2019-05-24 RX ORDER — LORAZEPAM 0.5 MG/1
TABLET ORAL
Refills: 2 | COMMUNITY
Start: 2019-05-19 | End: 2019-07-25

## 2019-05-24 RX ORDER — PREDNISONE 20 MG/1
TABLET ORAL
Qty: 15 TABLET | Refills: 0 | Status: SHIPPED | OUTPATIENT
Start: 2019-05-24 | End: 2019-06-18

## 2019-05-24 ASSESSMENT — ENCOUNTER SYMPTOMS
COUGH: 0
VOMITING: 0
RHINORRHEA: 0
WHEEZING: 0
CONSTIPATION: 0
BLOOD IN STOOL: 0
NAUSEA: 0
CHEST TIGHTNESS: 0
DIARRHEA: 0
BACK PAIN: 0
SORE THROAT: 0
SHORTNESS OF BREATH: 0
ABDOMINAL PAIN: 0
EYE PAIN: 0

## 2019-05-24 NOTE — PROGRESS NOTES
no rebound and no guarding. Musculoskeletal: Normal range of motion. She exhibits no edema. Lymphadenopathy:     She has no cervical adenopathy. Neurological: She is alert and oriented to person, place, and time. She has normal reflexes. No cranial nerve deficit. Skin: Skin is warm and dry. No rash noted. Psychiatric: She has a normal mood and affect. Nursing note and vitals reviewed.       Assessment:      Left neck mass        Plan:      Prednisone 20 mg BID/QD        Federico Matos MD

## 2019-06-16 DIAGNOSIS — J45.40 MODERATE PERSISTENT ASTHMA WITHOUT COMPLICATION: ICD-10-CM

## 2019-06-17 ENCOUNTER — TELEPHONE (OUTPATIENT)
Dept: FAMILY MEDICINE CLINIC | Age: 74
End: 2019-06-17

## 2019-06-17 RX ORDER — BUDESONIDE AND FORMOTEROL FUMARATE DIHYDRATE 160; 4.5 UG/1; UG/1
AEROSOL RESPIRATORY (INHALATION)
Qty: 10.2 INHALER | Refills: 10 | OUTPATIENT
Start: 2019-06-17

## 2019-06-18 ENCOUNTER — OFFICE VISIT (OUTPATIENT)
Dept: ENT CLINIC | Age: 74
End: 2019-06-18
Payer: MEDICARE

## 2019-06-18 VITALS
RESPIRATION RATE: 16 BRPM | BODY MASS INDEX: 37.63 KG/M2 | WEIGHT: 226.1 LBS | TEMPERATURE: 98.4 F | SYSTOLIC BLOOD PRESSURE: 124 MMHG | DIASTOLIC BLOOD PRESSURE: 82 MMHG | HEART RATE: 68 BPM

## 2019-06-18 DIAGNOSIS — R13.14 PHARYNGOESOPHAGEAL DYSPHAGIA: ICD-10-CM

## 2019-06-18 DIAGNOSIS — Z87.09 HISTORY OF VOCAL CORD PARALYSIS: ICD-10-CM

## 2019-06-18 DIAGNOSIS — K21.9 GERD WITHOUT ESOPHAGITIS: Primary | ICD-10-CM

## 2019-06-18 PROCEDURE — 31575 DIAGNOSTIC LARYNGOSCOPY: CPT | Performed by: OTOLARYNGOLOGY

## 2019-06-18 PROCEDURE — 99203 OFFICE O/P NEW LOW 30 MIN: CPT | Performed by: OTOLARYNGOLOGY

## 2019-06-18 ASSESSMENT — ENCOUNTER SYMPTOMS
SINUS PRESSURE: 0
STRIDOR: 0
CHOKING: 0
RHINORRHEA: 0
VOICE CHANGE: 0
COUGH: 0
NAUSEA: 0
VOMITING: 0
FACIAL SWELLING: 0
CHEST TIGHTNESS: 0
ABDOMINAL PAIN: 0
APNEA: 0
WHEEZING: 0
TROUBLE SWALLOWING: 1
DIARRHEA: 0
COLOR CHANGE: 0
SHORTNESS OF BREATH: 0
SORE THROAT: 0

## 2019-06-18 NOTE — LETTER
[address] [facility] and [address] [place], [place] [address]diane Vincent [address]  [address]  Phone: [phone]  Fax: [address], [place]        June 24, 2019    Patient: Malachi Fuentes   MR Number: [id number]   YOB: 1945   Date of Visit: 6/18/2019     Dear Dr. Kumar Gibbs,    Thank you for the request for consultation for Malachi Fuentes to me for the evaluation of dysphagia. Not sure we will be able to help her much. Below are the relevant portions of my assessment and plan of care. Assessment & Plan   Diagnoses and all orders for this visit:     Diagnosis Orders   1. GERD without esophagitis  LA LARYNGOSCOPY FLEXIBLE DIAGNOSTIC    SLP FEES evaluation   2. Pharyngoesophageal dysphagia  LA LARYNGOSCOPY FLEXIBLE DIAGNOSTIC    SLP FEES evaluation   3. History of vocal cord paralysis  LA LARYNGOSCOPY FLEXIBLE DIAGNOSTIC    SLP FEES evaluation    LEFT, RESOLVED       The findings were explained and her questions were answered. I was happy to inform her that her left vocal cord is working very well. Prior swallow showed reflux from 5 years ago as noted above. Presumably this was before her Nissen procedure    I explained an endoscopic evaluation of her swallowing. She would be happy to proceed with that. Her ENT exam otherwise was unremarkable with respect to swallowing. Return in about 1 month (around 7/18/2019) for Imaging Results Review. If you have questions, please do not hesitate to call me. I look forward to following Sebastien Villalba along with you.     Sincerely,          Nava Vo MD

## 2019-06-18 NOTE — PROGRESS NOTES
[address], NOSE AND THROAT  [facility]  [facility] [address] [address]  Dept: [phone]  Dept Fax: [phone]  Loc: [phone]    Kiko Skelton is a 69 y.o. female who was referred byRamana Barnett* for:  Chief Complaint   Patient presents with    Dysphagia     New patient here for abnormal swallowing. Referred by JEN Anthony HPI:     Kiko Skelton is a 69 y.o. female who presents today for valuation of dysphagia. She has had reflux for years and had a Nissen fundoplication 5 years ago. She has been hoarse for about 8 years but her hoarseness got worse after the surgery 5 years ago. 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 Her swallowing has been getting worse. She declines a barium swallow. Results of upper GI  2014  UPPER GI:  Double contrast upper gastrointestinal tract examination was         performed including thin and thick barium and air crystals. Yasmany Ding is majo        gastroesophageal reflux to the thoracic inlet occurring repeatedly and        clearing in under 30 seconds.  Esophageal motility appears satisfactory. History: Allergies   Allergen Reactions    Latex Hives     Patient noted in preop that she might be allergic - maybe breaking out when exposed.  Demerol Hcl [Meperidine] Nausea And Vomiting     confusion    Propoxyphene     Tape Myron Cools Tape]      Current Outpatient Medications   Medication Sig Dispense Refill    LORazepam (ATIVAN) 0.5 MG tablet TAKE 1 TABLET BY MOUTH EVERY 12 HOURS AS NEEDED FOR ANXIETY FOR UP TO 30 DAYS.  F41.9  2    oxybutynin (DITROPAN-XL) 5 MG extended release tablet Take 5 mg by mouth daily      diclofenac sodium 1 % GEL Apply 2 g topically 4 times daily 2 Tube 1    bumetanide (BUMEX) 0.5 MG tablet Take 1 tablet by mouth daily 30 tablet 3    albuterol sulfate HFA (PROAIR HFA) 108 (90 Base) MCG/ACT inhaler INHALE 2 PUFFS BY MOUTH 4 TIMES A DAY AS NEEDED 1 Inhaler 3    omeprazole (PRILOSEC) 40 MG delayed release capsule Take 1 capsule by mouth daily 30 capsule 5    Elastic Bandages & Supports (JOBST KNEE HIGH COMPRESSION SM) MISC 1 each by Does not apply route daily as needed (swelling) 1 each 0    doxepin (SINEQUAN) 10 MG capsule Take 1 capsule by mouth nightly 30 capsule 3    simvastatin (ZOCOR) 40 MG tablet Take 1 tablet by mouth nightly 90 tablet 3    ranitidine (ZANTAC) 150 MG capsule Take 1 capsule by mouth 2 times daily 180 capsule 3    citalopram (CELEXA) 20 MG tablet Take 1 tablet by mouth 2 times daily 180 tablet 3    propranolol (INDERAL LA) 120 MG extended release capsule Take 1 capsule by mouth daily 90 capsule 3    budesonide-formoterol (SYMBICORT) 160-4.5 MCG/ACT AERO Inhale 2 puffs into the lungs 2 times daily 1 Inhaler 11    nitroGLYCERIN (NITROSTAT) 0.4 MG SL tablet Place 1 tablet under the tongue every 5 minutes as needed for Chest pain 25 tablet 1    Cholecalciferol (VITAMIN D3) 5000 units TABS Take by mouth daily       aspirin 81 MG tablet Take 81 mg by mouth daily      fexofenadine (ALLEGRA) 180 MG tablet Take 180 mg by mouth daily.  Multiple Vitamin (MULTI-VITAMINS PO) Take  by mouth daily.  acetaminophen (TYLENOL) 500 MG tablet Take 500 mg by mouth every 6 hours as needed for Pain       No current facility-administered medications for this visit.       Facility-Administered Medications Ordered in Other Visits   Medication Dose Route Frequency Provider Last Rate Last Dose    cefOXitin (MEFOXIN) 2 g in dextrose 5% 50 mL (mini-bag)  2 g Intravenous Once Cesar Rodgers MD         Past Medical History:   Diagnosis Date    Anxiety     Arthritis     Asthma     [facility]     Depression     Diabetes mellitus [facility]     Headache(784.0)     Hyperlipidemia     Hypertension     Nausea & vomiting     Persistent proteinuria associated with type 2 diabetes mellitus ([facility]     Unspecified cerebral artery occlusion with cerebral infarction       Past Surgical History:   Procedure Laterality Date    BREAST BIOPSY      CARDIAC CATHETERIZATION      CHOLECYSTECTOMY      COLONOSCOPY      ENDOSCOPY, COLON, DIAGNOSTIC      FINGER SURGERY  tumor    HERNIA REPAIR      HIATAL HERNIA REPAIR  7-    stomach repair    PARTIAL NEPHRECTOMY Left 11/21/2014    robotic left partial nephrectomy    TONSILLECTOMY      TUBAL LIGATION       Family History   Problem Relation Age of Onset    Cancer Mother     Heart Disease Mother     High Blood Pressure Mother     High Cholesterol Mother     Stroke Mother     Cancer Father     Heart Disease Father     High Blood Pressure Father     High Cholesterol Father     Cancer Sister      Social History     Tobacco Use    Smoking status: Never Smoker    Smokeless tobacco: Never Used   Substance Use Topics    Alcohol use: No       Subjective:      Review of Systems   Constitutional: Negative for activity change, appetite change, chills, diaphoresis, fatigue, fever and unexpected weight change. HENT: Positive for trouble swallowing. Negative for congestion, dental problem, ear discharge, ear pain, facial swelling, hearing loss, mouth sores, nosebleeds, postnasal drip, rhinorrhea, sinus pressure, sneezing, sore throat, tinnitus and voice change. Eyes: Negative for visual disturbance. Respiratory: Negative for apnea, cough, choking, chest tightness, shortness of breath, wheezing and stridor. Cardiovascular: Negative for chest pain, palpitations and leg swelling. Gastrointestinal: Negative for abdominal pain, diarrhea, nausea and vomiting. Endocrine: Negative for cold intolerance, heat intolerance, polydipsia and polyuria. Genitourinary: Negative for dysuria, enuresis and hematuria. Musculoskeletal: Negative for arthralgias, gait problem, neck pain and neck stiffness. Skin: Negative for color change and rash.    Allergic/Immunologic: Negative for environmental allergies, food allergies and immunocompromised state. Neurological: Negative for dizziness, syncope, facial asymmetry, speech difficulty, light-headedness and headaches. Hematological: Negative for adenopathy. Does not bruise/bleed easily. Psychiatric/Behavioral: Negative for confusion and sleep disturbance. The patient is not nervous/anxious. Objective:   /82 (Site: Left Upper Arm, Position: Sitting)   Pulse 68   Temp 98.4 °F (36.9 °C) (Oral)   Resp 16   Wt 226 lb 1.6 oz (102.6 kg)   BMI 37.63 kg/m²     Physical Exam   Constitutional: She is oriented to person, place, and time. She appears well-developed and well-nourished. She is cooperative. HENT:   Head: Normocephalic and atraumatic. Head is without laceration. Right Ear: Hearing, external ear and ear canal normal. No drainage or swelling. Tympanic membrane is not perforated and not erythematous. No middle ear effusion. Left Ear: Hearing, tympanic membrane, external ear and ear canal normal. No drainage or swelling. Tympanic membrane is not perforated and not erythematous. No middle ear effusion. Nose: Nose normal. No mucosal edema, rhinorrhea or septal deviation. Mouth/Throat: Uvula is midline, oropharynx is clear and moist and mucous membranes are normal. Mucous membranes are not pale and not dry. No oral lesions. No uvula swelling. No oropharyngeal exudate, posterior oropharyngeal edema or posterior oropharyngeal erythema. Turbinates: normal  LIps: lips normal     Mallampati 1  Tonsils:Unremarkable  Base of tongue: symmetric,  Larynx, mirror exam: unable due to gag reflex     Eyes: Right eye exhibits normal extraocular motion and no nystagmus. Left eye exhibits normal extraocular motion and no nystagmus. Conjugate gaze   Neck: Trachea normal and phonation normal. Neck supple. No tracheal deviation present. No thyroid mass and no thyromegaly present. No adenopathy.   Salivary glands not enlarged and normal to palpation     Pulmonary/Chest: Effort normal. No stridor. Neurological: She is alert and oriented to person, place, and time. No cranial nerve deficit (VIIth N function intact bilat). Psychiatric: She has a normal mood and affect. Nursing note and vitals reviewed. PROCEDURE: FIBEROPTIC LARYNGOSCOPY    A fiberoptic laryngoscopy was performed under topical anesthesia, after using Afrin and Lidocaine spray in the nasal fossa. The nasal fossa, nasopharynx, hypopharynx and larynx were carefully examined. Base of tongue was symmetrical. Epiglottis appeared normal and was not retrodisplaced. True vocal cords had normal mobility. There was arytenoid erythema. No mucosal lesions or masses were noted. No pooling in the pyriform sinuses. Data:  All of the past medical history, past surgical history, family history,social history, allergies and current medications were reviewed with the patient. Assessment & Plan   Diagnoses and all orders for this visit:     Diagnosis Orders   1. GERD without esophagitis  [facility] LARYNGOSCOPY FLEXIBLE DIAGNOSTIC    SLP FEES evaluation   2. Pharyngoesophageal dysphagia  [facility] LARYNGOSCOPY FLEXIBLE DIAGNOSTIC    SLP FEES evaluation   3. History of vocal cord paralysis  [place] LARYNGOSCOPY FLEXIBLE DIAGNOSTIC    SLP FEES evaluation    LEFT, RESOLVED       The findings were explained and her questions were answered. I was happy to inform her that her left vocal cord is working very well. Prior swallow showed reflux from 5 years ago as noted above. Presumably this was before her Nissen procedure    I explained an endoscopic evaluation of her swallowing. She would be happy to proceed with that. Her ENT exam otherwise was unremarkable with respect to swallowing. Return in about 1 month (around 7/18/2019) for Imaging Results Review. Khoa Pabon. Malu Malave MD    **This report has been created using voice recognition software.  It may contain minor errors which are inherent in voicerecognition technology. **

## 2019-06-27 ENCOUNTER — HOSPITAL ENCOUNTER (OUTPATIENT)
Dept: SPEECH THERAPY | Age: 74
Setting detail: THERAPIES SERIES
Discharge: HOME | End: 2019-06-27
Payer: MEDICARE

## 2019-06-27 ENCOUNTER — HOSPITAL ENCOUNTER (OUTPATIENT)
Dept: PHYSICAL THERAPY | Age: 74
Setting detail: THERAPIES SERIES
Discharge: HOME | End: 2019-06-27
Payer: MEDICARE

## 2019-06-27 PROCEDURE — 92612 ENDOSCOPY SWALLOW (FEES) VID: CPT | Performed by: SPEECH-LANGUAGE PATHOLOGIST

## 2019-06-27 NOTE — PROGRESS NOTES
[address] [address]  SPEECH THERAPY  Fiberoptic Endoscopic Evaluation of Swallowing  Outpatient [address] [place] [address] IN: 0800  TIME OUT: 9537  UNTIMED TREATMENT: 55  TIMED TREATMENT: 0  TOTAL TIME: 55 minutes       Therapy Whiteboard: swallowing - 6    Date: 2019  Patient Name: Denise Pradhan       CSN: [id number]   : 1945  (69 y.o.)  Gender: female   Referring Physician:  Dr. Pooja Sweeney  Diagnosis: GERD without esophagitis (K21.9); Pharyngoesophageal dysphagia (R13.14); History of vocal cord paralysis (Z87.09)  Secondary Diagnosis:  Dysphagia, dysphonia  Insurance/Certification Information: [employer]  Visit number / total approved visits: 1 - No visit limit, CPT codes 47566, 76055, 55018 are all valid and billable  Visit count since last progress note:  1  Certification Date: n/a  Date of Last MBS:  n/a  Diet:  Regular with thin liquids  History of Present Illness/Injury: Patient reports she has been having difficulty swallowing (mostly pills - they would get caught in her throat) for ~2 months. Patient denies the swallowing difficulty getting any worse since the onset. Patient denies much difficulty swallowing food/liquid. Patient reports her voice has been hoarse for \"years\" (patient is guessing it has more than 10 years). Patient reports she was told she had left vocal cord paralysis years ago, but was just scoped 1-2 weeks ago and the vocal cord paralysis appeared to be resolved. Patient has a history of GERD and fundoplication surgery at [facility] at least 5 years ago. Patient  has a past medical history of Anxiety, Arthritis, Asthma, Cancer ([address] Depression, Diabetes mellitus ([address] Headache(784.0), Hyperlipidemia, Hypertension, Nausea & vomiting, Persistent proteinuria associated with type 2 diabetes mellitus ([address] and Unspecified cerebral artery occlusion with cerebral infarction.       Reason for Study: assess swallow function and visualize the true vocal cords d/t hoarseness  Prior Swallowing Evaluation/Results: n/a  Pain:  0/10. Current Diet:  Regular Diet    Current Liquids: Thin Liquids    Respiratory Status:Independent    Behavioral Observation:alert, oriented, cooperative and compliant      Cognition: Exam not limited by cognition    ORAL MECHANISM EXAM:  Facial Symmetry: No deficits observed  Labial/Facial: No deficits observed  Lingual: No deficits observed  Oral Mucosa: No deficits observed  Mandibular Movement: No deficits observed  Velum: No deficits observed. Patient has a growth along the hard palate  Sensation: No deficits observed  Cough/Reflexes: No deficits observed    FEES PROCEDURE DETAILS:  Procedure performed by: Salas Reynoso M.S. [address] [address]  Feeder/Assistant: Keon Velazquez  Scope/Serial Number: Chip Tip Flex Scope   Topical Anesthetic Used: No  Patient Positioning: Chair/90 degrees    Procedure explained and education provided to patient including purpose, benefits and risks of testing. Understanding and agreement with testing was verbalized. A flexible fiber-optic nasoendoscope was passed transnasally to view the nasopharynx, oropharynx, hypopharynx, larynx through the right nares without difficuty.     Patient tolerance of procedure: No complications or complaints observed or reported    ANATOMIC/PHYSIOLIGIC ASSESSMENT:  Nasal Cavity: No deficits observed  Velopharyngeal Port: No deficits observed  Base of Tongue: No deficits observed  Lingual Tonsils: No deficits observed  Vocal Fold Margin - Right: No deficits observed  Vocal Fold Margin - Left: No deficits observed  Abductory/Adductory Movement: No deficits observed  Ventricular Folds:No deficits observed  Pharyngeal Contraction for Pitch Glide: not tested  Epiglottis: No deficits observed  Secretions - appearance: No secretions noted  Secretions - location: n/a   Valleculae: No deficits observed  Pyriform Sinus: No deficits observed  Glottal Closure: unable to visualize d/t hyperfunction            VLS Completed:  No    PATIENT WAS EVALUATED USING:   Secretions    Ice Chips   x Thin    Nectar    Honey    Pudding Thick   x Puree   x Soft   x Solid         SWALLOW OBSERVATION:  \"X\" indicates positive observation   Delayed oral transit    Nasal Regurgitation   x Premature Spillage - mild    Piecemeal Deglutition    Incomplete Epiglottic Inversion    Residue - Vallecular    Residue - Pyriform    Residue - Base of Tongue    Residue - Posterior Pharyngeal wall    Esophageal Backflow         LARYNGEAL PENETRATION/ASPIRATION:   No evidence of laryngeal penetration   x No evidence of aspiration   x Laryngeal penetration evident during swallow with juices from the fruit and thin liquids via straw with consecutive drinks. Penetration clears with a throat clear. Audible aspiration evident      Silent aspiration evident          PHARYNGEAL PHASE DEJAN SCORE (dysphagia outcome and severity score)  5 = Mild dysphagia:  May need one consistency restricted; May have one or more of the following:  Aspiration with thin  cough to clear; Airway penetration midway to the vocal cords with one or more consistency or to the vocal folds with one consistency, but clears spontaneously; Residue in the pharynx clears spontaneously. PENETRATION-ASPIRATION SCALE (PAS)  2=Material enters the airway, remains above the vocal folds, and is ejected from the airway    ESOPHAGEAL PHASE: No significant findings     ATTEMPTED TECHNIQUES:  Small bolus size Effective   Straw Not Effective   Cup Effective   Chin tuck Not Attempted   Head Turn Not Attempted   Spoon presentations Not Attempted with liquids   Volitional cough Not Attempted   Spontaneous cough Not Attempted         DIAGNOSTIC IMPRESSIONS: Patient presents with mild pharyngeal dysphagia characterized by premature spillage which results in laryngeal penetration with juices from the fruit and thin liquids via straw with consecutive swallows.   No aspiration evident throughout this evaluation. DIET RECOMMENDATIONS: Regular with thin liquids    ADDITIONAL TESTING RECOMMENDATIONS: n/a    STRATEGIES:  Eat with small bites (1/2 tsp; 1 tsp), slow rate, sit upright    EDUCATION:  Education Provided: Reviewed results and recommendations of this evaluation, signs, symptoms, and risk of aspiration, as well as diet recommendations and strategies. Reviewed and discussed goals and plan of care, including will review the FEES with Dr. Jordan Díaz.   Learner:patient  Method:AV materials and discussion  Evaluation:Verbalized understanding      PLAN/TREATMENT RECOMMENDATIONS  No further speech therapy services indicated        Paulina Burgos M.S. [address] [address]

## 2019-07-03 DIAGNOSIS — E78.00 HYPERCHOLESTEROLEMIA: ICD-10-CM

## 2019-07-03 DIAGNOSIS — F32.A DEPRESSION, UNSPECIFIED DEPRESSION TYPE: ICD-10-CM

## 2019-07-03 DIAGNOSIS — K21.9 GASTROESOPHAGEAL REFLUX DISEASE, ESOPHAGITIS PRESENCE NOT SPECIFIED: ICD-10-CM

## 2019-07-03 DIAGNOSIS — I10 BENIGN ESSENTIAL HTN: ICD-10-CM

## 2019-07-03 RX ORDER — SIMVASTATIN 40 MG
TABLET ORAL
Qty: 90 TABLET | Refills: 3 | OUTPATIENT
Start: 2019-07-03

## 2019-07-03 RX ORDER — CITALOPRAM 20 MG/1
TABLET ORAL
Qty: 180 TABLET | Refills: 3 | OUTPATIENT
Start: 2019-07-03

## 2019-07-03 RX ORDER — RANITIDINE 150 MG/1
CAPSULE ORAL
Qty: 180 CAPSULE | Refills: 3 | OUTPATIENT
Start: 2019-07-03

## 2019-07-03 RX ORDER — HYDROCHLOROTHIAZIDE 25 MG/1
TABLET ORAL
Qty: 90 TABLET | Refills: 3 | OUTPATIENT
Start: 2019-07-03

## 2019-07-03 RX ORDER — PROPRANOLOL HYDROCHLORIDE 120 MG/1
CAPSULE, EXTENDED RELEASE ORAL
Qty: 90 CAPSULE | Refills: 3 | OUTPATIENT
Start: 2019-07-03

## 2019-07-09 ENCOUNTER — OFFICE VISIT (OUTPATIENT)
Dept: FAMILY MEDICINE CLINIC | Age: 74
End: 2019-07-09
Payer: MEDICARE

## 2019-07-09 VITALS
BODY MASS INDEX: 37.75 KG/M2 | RESPIRATION RATE: 14 BRPM | WEIGHT: 226.6 LBS | SYSTOLIC BLOOD PRESSURE: 132 MMHG | HEIGHT: 65 IN | DIASTOLIC BLOOD PRESSURE: 78 MMHG | HEART RATE: 80 BPM

## 2019-07-09 DIAGNOSIS — I10 BENIGN ESSENTIAL HTN: ICD-10-CM

## 2019-07-09 DIAGNOSIS — E11.29 TYPE 2 DIABETES MELLITUS WITH MICROALBUMINURIA, WITHOUT LONG-TERM CURRENT USE OF INSULIN (HCC): ICD-10-CM

## 2019-07-09 DIAGNOSIS — E78.00 HYPERCHOLESTEROLEMIA: ICD-10-CM

## 2019-07-09 DIAGNOSIS — K21.9 GASTROESOPHAGEAL REFLUX DISEASE, ESOPHAGITIS PRESENCE NOT SPECIFIED: ICD-10-CM

## 2019-07-09 DIAGNOSIS — F32.A DEPRESSION, UNSPECIFIED DEPRESSION TYPE: ICD-10-CM

## 2019-07-09 DIAGNOSIS — Z12.39 SCREENING FOR BREAST CANCER: ICD-10-CM

## 2019-07-09 DIAGNOSIS — J45.40 MODERATE PERSISTENT ASTHMA WITHOUT COMPLICATION: ICD-10-CM

## 2019-07-09 DIAGNOSIS — R60.9 PERIPHERAL EDEMA: ICD-10-CM

## 2019-07-09 DIAGNOSIS — R53.83 FATIGUE, UNSPECIFIED TYPE: ICD-10-CM

## 2019-07-09 DIAGNOSIS — Z00.00 ANNUAL PHYSICAL EXAM: Primary | ICD-10-CM

## 2019-07-09 DIAGNOSIS — F51.01 PRIMARY INSOMNIA: ICD-10-CM

## 2019-07-09 DIAGNOSIS — R80.9 TYPE 2 DIABETES MELLITUS WITH MICROALBUMINURIA, WITHOUT LONG-TERM CURRENT USE OF INSULIN (HCC): ICD-10-CM

## 2019-07-09 PROCEDURE — 99397 PER PM REEVAL EST PAT 65+ YR: CPT | Performed by: FAMILY MEDICINE

## 2019-07-09 RX ORDER — BUDESONIDE AND FORMOTEROL FUMARATE DIHYDRATE 160; 4.5 UG/1; UG/1
2 AEROSOL RESPIRATORY (INHALATION) 2 TIMES DAILY
Qty: 1 INHALER | Refills: 11 | Status: SHIPPED | OUTPATIENT
Start: 2019-07-09 | End: 2020-06-10 | Stop reason: SDUPTHER

## 2019-07-09 RX ORDER — BUMETANIDE 0.5 MG/1
0.5 TABLET ORAL DAILY
Qty: 30 TABLET | Refills: 11 | Status: SHIPPED | OUTPATIENT
Start: 2019-07-09 | End: 2020-06-10 | Stop reason: SDUPTHER

## 2019-07-09 RX ORDER — ALBUTEROL SULFATE 90 UG/1
AEROSOL, METERED RESPIRATORY (INHALATION)
Qty: 1 INHALER | Refills: 11 | Status: SHIPPED | OUTPATIENT
Start: 2019-07-09 | End: 2020-06-10

## 2019-07-09 RX ORDER — RANITIDINE 150 MG/1
150 CAPSULE ORAL 2 TIMES DAILY
Qty: 60 CAPSULE | Refills: 11 | Status: SHIPPED | OUTPATIENT
Start: 2019-07-09 | End: 2019-11-13 | Stop reason: ALTCHOICE

## 2019-07-09 RX ORDER — SIMVASTATIN 40 MG
40 TABLET ORAL NIGHTLY
Qty: 30 TABLET | Refills: 11 | Status: SHIPPED | OUTPATIENT
Start: 2019-07-09 | End: 2020-06-10 | Stop reason: SDUPTHER

## 2019-07-09 RX ORDER — CITALOPRAM 20 MG/1
20 TABLET ORAL 2 TIMES DAILY
Qty: 60 TABLET | Refills: 11 | Status: SHIPPED | OUTPATIENT
Start: 2019-07-09 | End: 2020-06-10 | Stop reason: SDUPTHER

## 2019-07-09 RX ORDER — DOXEPIN HYDROCHLORIDE 10 MG/1
10 CAPSULE ORAL NIGHTLY
Qty: 30 CAPSULE | Refills: 11 | Status: SHIPPED | OUTPATIENT
Start: 2019-07-09 | End: 2020-06-10 | Stop reason: SDUPTHER

## 2019-07-09 RX ORDER — PROPRANOLOL HYDROCHLORIDE 120 MG/1
120 CAPSULE, EXTENDED RELEASE ORAL DAILY
Qty: 30 CAPSULE | Refills: 11 | Status: SHIPPED | OUTPATIENT
Start: 2019-07-09 | End: 2020-06-10 | Stop reason: SDUPTHER

## 2019-07-09 RX ORDER — LACTOBACILLUS COMBINATION NO.4 3B CELL
CAPSULE ORAL
COMMUNITY

## 2019-07-09 RX ORDER — OMEPRAZOLE 40 MG/1
40 CAPSULE, DELAYED RELEASE ORAL DAILY
Qty: 30 CAPSULE | Refills: 11 | Status: SHIPPED | OUTPATIENT
Start: 2019-07-09 | End: 2020-06-10 | Stop reason: SDUPTHER

## 2019-07-09 ASSESSMENT — ENCOUNTER SYMPTOMS
DIARRHEA: 0
RHINORRHEA: 0
VOICE CHANGE: 1
SORE THROAT: 0
VOMITING: 0
COUGH: 0
NAUSEA: 0
CHEST TIGHTNESS: 0
EYE PAIN: 0
ABDOMINAL PAIN: 0
CONSTIPATION: 0
BACK PAIN: 0
SHORTNESS OF BREATH: 0
BLOOD IN STOOL: 0
WHEEZING: 0

## 2019-07-09 NOTE — PATIENT INSTRUCTIONS
for you. A heart-healthy lifestyle along with medicines can help lower your cholesterol and your risk. The way you choose to lower your risk will depend on how high your risk is for heart attack and stroke. It will also depend on how you feel about taking medicines. Follow-up care is a key part of your treatment and safety. Be sure to make and go to all appointments, and call your doctor if you are having problems. It's also a good idea to know your test results and keep a list of the medicines you take. How can you care for yourself at home? · Eat a variety of foods every day. Good choices include fruits, vegetables, whole grains (like oatmeal), dried beans and peas, nuts and seeds, soy products (like tofu), and fat-free or low-fat dairy products. · Replace butter, margarine, and hydrogenated or partially hydrogenated oils with olive and canola oils. (Canola oil margarine without trans fat is fine.)  · Replace red meat with fish, poultry, and soy protein (like tofu). · Limit processed and packaged foods like chips, crackers, and cookies. · Bake, broil, or steam foods. Don't sevilla them. · Be physically active. Get at least 30 minutes of exercise on most days of the week. Walking is a good choice. You also may want to do other activities, such as running, swimming, cycling, or playing tennis or team sports. · Stay at a healthy weight or lose weight by making the changes in eating and physical activity listed above. Losing just a small amount of weight, even 5 to 10 pounds, can reduce your risk for having a heart attack or stroke. · Do not smoke. When should you call for help? Watch closely for changes in your health, and be sure to contact your doctor if:    · You need help making lifestyle changes.     · You have questions about your medicine. Where can you learn more? Go to https://petereb.[employer]. org and sign in to your [employer] account.  Enter T572 in the "[employer] box to [employer], [facility] disclaims any warranty or liability for your use of this information. Patient Education        Fatigue: Care Instructions  Your Care Instructions    Fatigue is a feeling of tiredness, exhaustion, or lack of energy. You may feel fatigue because of too much or not enough activity. It can also come from stress, lack of sleep, boredom, and poor diet. Many medical problems, such as viral infections, can cause fatigue. Emotional problems, especially depression, are often the cause of fatigue. Fatigue is most often a symptom of another problem. Treatment for fatigue depends on the cause. For example, if you have fatigue because you have a certain health problem, treating this problem also treats your fatigue. If depression or anxiety is the cause, treatment may help. Follow-up care is a key part of your treatment and safety. Be sure to make and go to all appointments, and call your doctor if you are having problems. It's also a good idea to know your test results and keep a list of the medicines you take. How can you care for yourself at home? · Get regular exercise. But don't overdo it. Go back and forth between rest and exercise. · Get plenty of rest.  · Eat a healthy diet. Do not skip meals, especially breakfast.  · Reduce your use of caffeine, tobacco, and alcohol. Caffeine is most often found in coffee, tea, cola drinks, and chocolate. · Limit medicines that can cause fatigue. This includes tranquilizers and cold and allergy medicines. When should you call for help? Watch closely for changes in your health, and be sure to contact your doctor if:    · You have new symptoms such as fever or a rash.     · Your fatigue gets worse.     · You have been feeling down, depressed, or hopeless. Or you may have lost interest in things that you usually enjoy.     · You are not getting better as expected. Where can you learn more? Go to https://chkrisseb.[employer]. org and sign in

## 2019-07-10 ENCOUNTER — OFFICE VISIT (OUTPATIENT)
Dept: CARDIOLOGY CLINIC | Age: 74
End: 2019-07-10
Payer: MEDICARE

## 2019-07-10 VITALS
DIASTOLIC BLOOD PRESSURE: 68 MMHG | SYSTOLIC BLOOD PRESSURE: 134 MMHG | HEART RATE: 76 BPM | HEIGHT: 66 IN | BODY MASS INDEX: 36.48 KG/M2 | WEIGHT: 227 LBS

## 2019-07-10 DIAGNOSIS — I25.10 CORONARY ARTERY DISEASE INVOLVING NATIVE CORONARY ARTERY OF NATIVE HEART WITHOUT ANGINA PECTORIS: Primary | ICD-10-CM

## 2019-07-10 DIAGNOSIS — I10 ESSENTIAL HYPERTENSION: ICD-10-CM

## 2019-07-10 DIAGNOSIS — E78.01 FAMILIAL HYPERCHOLESTEROLEMIA: ICD-10-CM

## 2019-07-10 PROCEDURE — 99213 OFFICE O/P EST LOW 20 MIN: CPT | Performed by: NUCLEAR MEDICINE

## 2019-07-10 RX ORDER — NITROGLYCERIN 0.4 MG/1
0.4 TABLET SUBLINGUAL EVERY 5 MIN PRN
Qty: 25 TABLET | Refills: 1 | Status: SHIPPED | OUTPATIENT
Start: 2019-07-10

## 2019-07-13 LAB
ABSOLUTE BASO #: 0.1 K/UL (ref 0–0.1)
ABSOLUTE EOS #: 0.2 K/UL (ref 0.1–0.4)
ABSOLUTE LYMPH #: 2.5 K/UL (ref 0.8–5.2)
ABSOLUTE MONO #: 0.7 K/UL (ref 0.1–0.9)
ABSOLUTE NEUT #: 5.2 K/UL (ref 1.3–9.1)
ALBUMIN SERPL-MCNC: 3.8 G/DL (ref 3.2–5.3)
ALK PHOSPHATASE: 112 U/L (ref 39–130)
ALT SERPL-CCNC: 10 U/L (ref 0–31)
ANION GAP SERPL CALCULATED.3IONS-SCNC: 10 MMOL/L (ref 4–12)
AST SERPL-CCNC: 13 U/L (ref 0–41)
AVERAGE GLUCOSE: 131 MG/DL (ref 66–114)
BASOPHILS RELATIVE PERCENT: 0.8 %
BILIRUB SERPL-MCNC: 0.5 MG/DL (ref 0.3–1.2)
BUN BLDV-MCNC: 11 MG/DL (ref 5–27)
CALCIUM SERPL-MCNC: 8.9 MG/DL (ref 8.5–10.5)
CHLORIDE BLD-SCNC: 105 MMOL/L (ref 98–109)
CHOLESTEROL/HDL RATIO: 3.3 (ref 1–5)
CHOLESTEROL: 183 MG/DL (ref 150–200)
CO2: 29 MMOL/L (ref 22–32)
CREAT SERPL-MCNC: 0.91 MG/DL (ref 0.4–1)
EGFR AFRICAN AMERICAN: >60 ML/MIN/1.73SQ.M
EGFR IF NONAFRICAN AMERICAN: >60 ML/MIN/1.73SQ.M
EOSINOPHILS RELATIVE PERCENT: 1.9 %
FOLATE: 6.4 NG/ML
GLUCOSE: 118 MG/DL (ref 65–99)
HBA1C MFR BLD: 6.2 %
HCT VFR BLD CALC: 36.3 % (ref 36–48)
HDLC SERPL-MCNC: 56 MG/DL
HEMOGLOBIN: 12.1 G/DL (ref 12–16)
LDL CHOLESTEROL CALCULATED: 96 MG/DL
LDL/HDL RATIO: 1.7
LYMPHOCYTE %: 29 %
MCH RBC QN AUTO: 28.3 PG (ref 27–34)
MCHC RBC AUTO-ENTMCNC: 33.3 G/DL (ref 31–36)
MCV RBC AUTO: 85 FL (ref 80–100)
MONOCYTES # BLD: 7.9 %
NEUTROPHILS RELATIVE PERCENT: 60.1 %
PDW BLD-RTO: 15.5 % (ref 10.8–14.8)
PLATELETS: 264 K/UL (ref 150–450)
POTASSIUM SERPL-SCNC: 4 MMOL/L (ref 3.5–5)
RBC: 4.27 M/UL (ref 4–5.5)
SODIUM BLD-SCNC: 144 MMOL/L (ref 134–146)
T3 FREE: 3.23 PG/ML (ref 2.5–3.9)
T4 FREE: 0.82 NG/DL (ref 0.61–1.6)
TOTAL PROTEIN: 6.2 G/DL (ref 6–8)
TRIGL SERPL-MCNC: 156 MG/DL (ref 27–150)
TSH SERPL DL<=0.05 MIU/L-ACNC: 1.1 UIU/ML (ref 0.49–4.67)
VITAMIN B-12: 320 PG/ML (ref 180–914)
VLDLC SERPL CALC-MCNC: 31 MG/DL (ref 0–30)
WBC: 8.7 K/UL (ref 3.7–10.8)

## 2019-07-15 ENCOUNTER — TELEPHONE (OUTPATIENT)
Dept: FAMILY MEDICINE CLINIC | Age: 74
End: 2019-07-15

## 2019-07-15 DIAGNOSIS — E11.29 TYPE 2 DIABETES MELLITUS WITH MICROALBUMINURIA, WITHOUT LONG-TERM CURRENT USE OF INSULIN (HCC): Primary | ICD-10-CM

## 2019-07-15 DIAGNOSIS — R80.9 TYPE 2 DIABETES MELLITUS WITH MICROALBUMINURIA, WITHOUT LONG-TERM CURRENT USE OF INSULIN (HCC): Primary | ICD-10-CM

## 2019-07-16 ENCOUNTER — OFFICE VISIT (OUTPATIENT)
Dept: ENT CLINIC | Age: 74
End: 2019-07-16
Payer: MEDICARE

## 2019-07-16 VITALS
SYSTOLIC BLOOD PRESSURE: 136 MMHG | RESPIRATION RATE: 16 BRPM | BODY MASS INDEX: 36.42 KG/M2 | HEART RATE: 80 BPM | TEMPERATURE: 98.2 F | DIASTOLIC BLOOD PRESSURE: 86 MMHG | HEIGHT: 66 IN | WEIGHT: 226.6 LBS

## 2019-07-16 DIAGNOSIS — Z87.09 HISTORY OF VOCAL CORD PARALYSIS: ICD-10-CM

## 2019-07-16 DIAGNOSIS — R49.0 HOARSENESS: ICD-10-CM

## 2019-07-16 DIAGNOSIS — R13.14 PHARYNGOESOPHAGEAL DYSPHAGIA: Primary | ICD-10-CM

## 2019-07-16 DIAGNOSIS — K21.9 GERD WITHOUT ESOPHAGITIS: ICD-10-CM

## 2019-07-16 LAB
CREATINE, URINE: 202.45 MG/DL
MICROALBUMIN/CREAT 24H UR: <0.7 MG/DL (ref 0–1.9)
MICROALBUMIN/CREAT UR-RTO: <1 MG/G CREAT (ref 0–30)

## 2019-07-16 PROCEDURE — 99213 OFFICE O/P EST LOW 20 MIN: CPT | Performed by: OTOLARYNGOLOGY

## 2019-07-16 ASSESSMENT — ENCOUNTER SYMPTOMS
CHOKING: 0
VOICE CHANGE: 0
COUGH: 0
DIARRHEA: 0
APNEA: 0
STRIDOR: 0
SHORTNESS OF BREATH: 0
ABDOMINAL PAIN: 0
TROUBLE SWALLOWING: 0
WHEEZING: 0
SORE THROAT: 0
COLOR CHANGE: 0
RHINORRHEA: 0
SINUS PRESSURE: 0
VOMITING: 0
NAUSEA: 0
FACIAL SWELLING: 0
CHEST TIGHTNESS: 0

## 2019-07-16 NOTE — PROGRESS NOTES
[address], NOSE AND THROAT  [address] [facility]  Dept: [phone]  Dept Fax: [phone]  Loc: [phone]    Jl Benitez is a 69 y.o. female who was referred byNo ref. provider found for:  Chief Complaint   Patient presents with    1 Month Follow-Up     Patient here to Follow-up for Imaging Results Review completed 06/27/2019   . HPI:     Jl Benitez is a 69 y.o. female who presents today for  evaluation of dysphagia. She has had reflux for years and had a Nissen fundoplication 5 years ago. She has been hoarse for about 8 years but her hoarseness got worse after the surgery 5 years ago. She was told she had a paralyzed left vocal cord from about 10 years ago, but laryngoscopy on her last visit showed the true vocal cords were normally mobile. Her voice is somewhat hoarse but much clearer than one would expect with a paralyzed cord. .   She had declined a barium swallow, so we performed a FEES. Her swallowing had been getting worse, but since the FEES and following the speech pathologist's suggestions, she states her swallowing is improved. I personally reviewed the FEES. Vocal cord function in appearance was normal in general but hyperfunction of the supraglottis prevented evaluation of the adequacy of glottal closure.   Critical components of the evaluation are included below:       ANATOMIC/PHYSIOLIGIC ASSESSMENT:  Nasal Cavity: No deficits observed  Velopharyngeal Port: No deficits observed  Base of Tongue: No deficits observed  Lingual Tonsils: No deficits observed  Vocal Fold Margin - Right: No deficits observed  Vocal Fold Margin - Left: No deficits observed  Abductory/Adductory Movement: No deficits observed  Ventricular Folds:No deficits observed  Pharyngeal Contraction for Pitch Glide: not tested  Epiglottis: No deficits observed  Secretions - appearance: No secretions noted  Secretions - location: n/a

## 2019-07-25 DIAGNOSIS — F41.9 ANXIETY: ICD-10-CM

## 2019-07-25 RX ORDER — LORAZEPAM 0.5 MG/1
0.5 TABLET ORAL EVERY 12 HOURS PRN
Qty: 60 TABLET | Refills: 2 | Status: SHIPPED | OUTPATIENT
Start: 2019-07-25 | End: 2019-11-13 | Stop reason: SDUPTHER

## 2019-07-25 RX ORDER — LORAZEPAM 0.5 MG/1
TABLET ORAL
Refills: 2 | Status: CANCELLED | OUTPATIENT
Start: 2019-07-25

## 2019-07-25 NOTE — TELEPHONE ENCOUNTER
ep'ed ativan to pharm requested      Controlled Substance Monitoring:    Acute and Chronic Pain Monitoring:   RX Monitoring 7/25/2019   Attestation -   Periodic Controlled Substance Monitoring No signs of potential drug abuse or diversion identified.

## 2019-08-07 PROBLEM — R13.14 PHARYNGOESOPHAGEAL DYSPHAGIA: Status: ACTIVE | Noted: 2019-08-07

## 2019-08-07 PROBLEM — R49.0 HOARSENESS: Status: ACTIVE | Noted: 2019-08-07

## 2019-08-08 ENCOUNTER — TELEPHONE (OUTPATIENT)
Dept: ENT CLINIC | Age: 74
End: 2019-08-08

## 2019-08-08 DIAGNOSIS — Z87.09 HISTORY OF VOCAL CORD PARALYSIS: ICD-10-CM

## 2019-08-08 DIAGNOSIS — R49.0 HOARSENESS: ICD-10-CM

## 2019-08-08 DIAGNOSIS — R13.14 PHARYNGOESOPHAGEAL DYSPHAGIA: Primary | ICD-10-CM

## 2019-08-23 ENCOUNTER — HOSPITAL ENCOUNTER (OUTPATIENT)
Dept: WOMENS IMAGING | Age: 74
Discharge: HOME | End: 2019-08-23
Payer: MEDICARE

## 2019-08-23 DIAGNOSIS — Z12.39 SCREENING FOR BREAST CANCER: ICD-10-CM

## 2019-08-23 DIAGNOSIS — Z00.00 ANNUAL PHYSICAL EXAM: ICD-10-CM

## 2019-08-23 PROCEDURE — 77063 BREAST TOMOSYNTHESIS BI: CPT

## 2019-09-05 ENCOUNTER — OFFICE VISIT (OUTPATIENT)
Dept: FAMILY MEDICINE CLINIC | Age: 74
End: 2019-09-05
Payer: MEDICARE

## 2019-09-05 VITALS
BODY MASS INDEX: 35.44 KG/M2 | RESPIRATION RATE: 16 BRPM | TEMPERATURE: 98.2 F | WEIGHT: 219.6 LBS | DIASTOLIC BLOOD PRESSURE: 72 MMHG | HEART RATE: 76 BPM | OXYGEN SATURATION: 93 % | SYSTOLIC BLOOD PRESSURE: 130 MMHG

## 2019-09-05 DIAGNOSIS — J45.41 MODERATE PERSISTENT ASTHMA WITH EXACERBATION: ICD-10-CM

## 2019-09-05 DIAGNOSIS — J40 BRONCHITIS: Primary | ICD-10-CM

## 2019-09-05 PROCEDURE — 99213 OFFICE O/P EST LOW 20 MIN: CPT | Performed by: NURSE PRACTITIONER

## 2019-09-05 RX ORDER — PREDNISONE 20 MG/1
20 TABLET ORAL 2 TIMES DAILY
Qty: 10 TABLET | Refills: 0 | Status: SHIPPED | OUTPATIENT
Start: 2019-09-05 | End: 2019-09-10

## 2019-09-05 RX ORDER — LORAZEPAM 0.5 MG/1
TABLET ORAL
Refills: 2 | COMMUNITY
Start: 2019-08-27 | End: 2019-11-13 | Stop reason: ALTCHOICE

## 2019-09-05 RX ORDER — AZITHROMYCIN 250 MG/1
250 TABLET, FILM COATED ORAL DAILY
Qty: 1 PACKET | Refills: 0 | Status: SHIPPED | OUTPATIENT
Start: 2019-09-05 | End: 2019-12-16 | Stop reason: ALTCHOICE

## 2019-09-05 ASSESSMENT — ENCOUNTER SYMPTOMS
EYE ITCHING: 0
SINUS PRESSURE: 0
CONSTIPATION: 0
BACK PAIN: 0
EYE DISCHARGE: 0
DIARRHEA: 0
COUGH: 1
RHINORRHEA: 1
SORE THROAT: 1
WHEEZING: 1
EYE PAIN: 0
ABDOMINAL PAIN: 0
SHORTNESS OF BREATH: 1

## 2019-09-05 NOTE — PROGRESS NOTES
[address] [place] [address]  Dept: [phone]  Dept Fax: [phone]: [phone]     Visit Date:  9/5/2019    Provider: NASRIN Sweet CNP        Patient:  Adrian Hernandez  YOB: 1945    HPI:     Chief Complaint   Patient presents with    Asthma     used her daughters atrovent nebulizer to help wiht her breathing for last 2 days       HPI    Adrian Hernandez is being seen today for asthma exacerbation. Body mass index is 35.44 kg/m². reviewed with patient. Started 3-4 days ago. Not getting any better or worse. Frequent coughing. Mild SOB. Able to speak full sentences. Was using ipratropium bromide . 02% nebulizer that belongs to her daughter. Is also using her albuterol 4x daily. Typically only uses it twice. Using symbicort as well, 4 times daily per her typical schedule. States she felt warm two nights ago but did not check her temperature. Sore throat started 3-4 days ago. States she wakes up during the night because of wheezing. Medications    Current Outpatient Medications:     azithromycin (ZITHROMAX) 250 MG tablet, Take 1 tablet by mouth daily Day 1 take 2 tablets.  Days 2-5 take 1 tablet., Disp: 1 packet, Rfl: 0    predniSONE (DELTASONE) 20 MG tablet, Take 1 tablet by mouth 2 times daily for 5 days, Disp: 10 tablet, Rfl: 0    ipratropium (ATROVENT) 0.02 % nebulizer solution, Take 2.5 mLs by nebulization 4 times daily, Disp: 300 mL, Rfl: 3    nitroGLYCERIN (NITROSTAT) 0.4 MG SL tablet, Place 1 tablet under the tongue every 5 minutes as needed for Chest pain, Disp: 25 tablet, Rfl: 1    Probiotic Product ([employer] PROBIOTIC) CAPS, Take by mouth, Disp: , Rfl:     budesonide-formoterol (SYMBICORT) 160-4.5 MCG/ACT AERO, Inhale 2 puffs into the lungs 2 times daily, Disp: 1 Inhaler, Rfl: 11    propranolol (INDERAL LA) 120 MG extended release capsule, Take 1 capsule by mouth daily, Disp:

## 2019-09-05 NOTE — PATIENT INSTRUCTIONS
Patient Education        Learning About Asthma  What is asthma? Asthma is a long-term condition that affects your breathing. It causes the airways that lead to the lungs to swell. People with asthma may have asthma attacks. During an asthma attack, the airways tighten and become narrower. This makes it hard to breathe, and you may wheeze or cough. If you have a bad asthma attack, you may need emergency care. Asthma affects people in different ways. Some people only have asthma attacks during allergy season, or when they breathe in cold air, or when they exercise. Others have many bad attacks that send them to the doctor often. What are the symptoms? Symptoms of asthma can be mild or severe. You may have mild attacks now and then, you may have severe symptoms every day, or you may have something in between. How often you have symptoms can also change. When you have asthma, you may:  · Wheeze, making a loud or soft whistling noise when you breathe in and out. · Cough a lot. · Feel tightness in your chest.  · Feel short of breath. · Have trouble sleeping because of coughing or having a hard time breathing. · Get tired quickly during exercise. Your symptoms may be worse at night. How can you prevent asthma attacks? Certain things can make asthma symptoms worse. These are called triggers. When you are around a trigger, an asthma attack is more likely. Common triggers include:  · Cigarette smoke or air pollution. · Things you are allergic to, such as:  ? Pollen, mold, or dust mites. ? Pet hair, skin, or saliva. · Illnesses, like colds, flu, or pneumonia. · Exercise. · Dry, cold air. Here are some ways to avoid a few common triggers:  · Do not smoke or allow others to smoke around you. If you need help quitting, talk to your doctor about stop-smoking programs and medicines. These can increase your chances of quitting for good.   · If there is a lot of pollution, pollen, or dust outside, stay at home medicines. These can increase your chances of quitting for good. When should you call for help? Call 911 anytime you think you may need emergency care. For example, call if:    · You have severe trouble breathing.    Call your doctor now or seek immediate medical care if:    · You have new or worse trouble breathing.     · You cough up dark brown or bloody mucus (sputum).     · You have a new or higher fever.     · You have a new rash.    Watch closely for changes in your health, and be sure to contact your doctor if:    · You cough more deeply or more often, especially if you notice more mucus or a change in the color of your mucus.     · You are not getting better as expected. Where can you learn more? Go to https://Travel Distribution SystemspeSynackeb.Sell My Timeshare NOW. org and sign in to your Qmerce account. Enter H333 in the Lumiy box to learn more about \"Bronchitis: Care Instructions. \"     If you do not have an account, please click on the \"Sign Up Now\" link. Current as of: September 5, 2018  Content Version: 12.1  © 8423-8715 Healthwise, Incorporated. Care instructions adapted under license by [facility] ([facility]). If you have questions about a medical condition or this instruction, always ask your healthcare professional. [address] any warranty or liability for your use of this information. Patient Education        Asthma: Your Action Plan  Sample Action Plan    Controller medicine action plan  Fill in the blank spaces and boxes that apply for all sections. · Name of your controller medicine:  ? ____________________________________________  · How much of this medicine do you take? ? ____________________________________________  · How often do you take this medicine? ? ____________________________________________  · Other instructions?   ? ____________________________________________  Quick-relief medicine action plan  · Name of your quick-relief

## 2019-11-13 DIAGNOSIS — F41.9 ANXIETY: ICD-10-CM

## 2019-11-13 RX ORDER — LORAZEPAM 0.5 MG/1
0.5 TABLET ORAL EVERY 12 HOURS PRN
Qty: 60 TABLET | Refills: 2 | Status: SHIPPED | OUTPATIENT
Start: 2019-11-13 | End: 2020-06-01 | Stop reason: SDUPTHER

## 2019-11-13 RX ORDER — LORAZEPAM 0.5 MG/1
TABLET ORAL
Refills: 2 | Status: CANCELLED | OUTPATIENT
Start: 2019-11-13

## 2019-12-07 DIAGNOSIS — J45.40 MODERATE PERSISTENT ASTHMA WITHOUT COMPLICATION: ICD-10-CM

## 2019-12-12 ENCOUNTER — HOSPITAL ENCOUNTER (OUTPATIENT)
Age: 74
Discharge: HOME | End: 2019-12-12
Payer: MEDICARE

## 2019-12-12 ENCOUNTER — HOSPITAL ENCOUNTER (OUTPATIENT)
Dept: CT IMAGING | Age: 74
Discharge: HOME | End: 2019-12-12
Payer: MEDICARE

## 2019-12-12 ENCOUNTER — HOSPITAL ENCOUNTER (OUTPATIENT)
Dept: GENERAL RADIOLOGY | Age: 74
Discharge: HOME | End: 2019-12-12
Payer: MEDICARE

## 2019-12-12 DIAGNOSIS — C64.2 RENAL CELL CARCINOMA OF LEFT KIDNEY (HCC): ICD-10-CM

## 2019-12-12 LAB
ALBUMIN SERPL-MCNC: 3.9 G/DL (ref 3.5–5.1)
ALP BLD-CCNC: 119 U/L (ref 38–126)
ALT SERPL-CCNC: 12 U/L (ref 11–66)
ANION GAP SERPL CALCULATED.3IONS-SCNC: 13 MEQ/L (ref 8–16)
AST SERPL-CCNC: 16 U/L (ref 5–40)
BASOPHILS # BLD: 0.7 %
BASOPHILS ABSOLUTE: 0.1 THOU/MM3 (ref 0–0.1)
BILIRUB SERPL-MCNC: 0.3 MG/DL (ref 0.3–1.2)
BUN BLDV-MCNC: 9 MG/DL (ref 7–22)
CALCIUM SERPL-MCNC: 8.9 MG/DL (ref 8.5–10.5)
CHLORIDE BLD-SCNC: 103 MEQ/L (ref 98–111)
CO2: 26 MEQ/L (ref 23–33)
CREAT SERPL-MCNC: 0.7 MG/DL (ref 0.4–1.2)
EOSINOPHIL # BLD: 2.4 %
EOSINOPHILS ABSOLUTE: 0.2 THOU/MM3 (ref 0–0.4)
ERYTHROCYTE [DISTWIDTH] IN BLOOD BY AUTOMATED COUNT: 15.9 % (ref 11.5–14.5)
ERYTHROCYTE [DISTWIDTH] IN BLOOD BY AUTOMATED COUNT: 52.2 FL (ref 35–45)
GFR SERPL CREATININE-BSD FRML MDRD: 82 ML/MIN/1.73M2
GLUCOSE BLD-MCNC: 122 MG/DL (ref 70–108)
HCT VFR BLD CALC: 38.6 % (ref 37–47)
HEMOGLOBIN: 12 GM/DL (ref 12–16)
IMMATURE GRANS (ABS): 0.03 THOU/MM3 (ref 0–0.07)
IMMATURE GRANULOCYTES: 0.3 %
LD: 168 U/L (ref 100–190)
LYMPHOCYTES # BLD: 33.1 %
LYMPHOCYTES ABSOLUTE: 3.1 THOU/MM3 (ref 1–4.8)
MCH RBC QN AUTO: 28.5 PG (ref 26–33)
MCHC RBC AUTO-ENTMCNC: 31.1 GM/DL (ref 32.2–35.5)
MCV RBC AUTO: 91.7 FL (ref 81–99)
MONOCYTES # BLD: 7.6 %
MONOCYTES ABSOLUTE: 0.7 THOU/MM3 (ref 0.4–1.3)
NUCLEATED RED BLOOD CELLS: 0 /100 WBC
PLATELET # BLD: 302 THOU/MM3 (ref 130–400)
PMV BLD AUTO: 11 FL (ref 9.4–12.4)
POC CREATININE WHOLE BLOOD: 0.9 MG/DL (ref 0.5–1.2)
POTASSIUM SERPL-SCNC: 3.7 MEQ/L (ref 3.5–5.2)
RBC # BLD: 4.21 MILL/MM3 (ref 4.2–5.4)
SEG NEUTROPHILS: 55.9 %
SEGMENTED NEUTROPHILS ABSOLUTE COUNT: 5.3 THOU/MM3 (ref 1.8–7.7)
SODIUM BLD-SCNC: 142 MEQ/L (ref 135–145)
TOTAL PROTEIN: 6.6 G/DL (ref 6.1–8)
WBC # BLD: 9.5 THOU/MM3 (ref 4.8–10.8)

## 2019-12-12 PROCEDURE — 80053 COMPREHEN METABOLIC PANEL: CPT

## 2019-12-12 PROCEDURE — 6360000004 HC RX CONTRAST MEDICATION: Performed by: PHYSICIAN ASSISTANT

## 2019-12-12 PROCEDURE — 83615 LACTATE (LD) (LDH) ENZYME: CPT

## 2019-12-12 PROCEDURE — 74178 CT ABD&PLV WO CNTR FLWD CNTR: CPT

## 2019-12-12 PROCEDURE — 36415 COLL VENOUS BLD VENIPUNCTURE: CPT

## 2019-12-12 PROCEDURE — 71046 X-RAY EXAM CHEST 2 VIEWS: CPT

## 2019-12-12 PROCEDURE — 85025 COMPLETE CBC W/AUTO DIFF WBC: CPT

## 2019-12-12 PROCEDURE — 82565 ASSAY OF CREATININE: CPT

## 2019-12-12 RX ADMIN — IOPAMIDOL 80 ML: 755 INJECTION, SOLUTION INTRAVENOUS at 09:44

## 2019-12-16 ENCOUNTER — OFFICE VISIT (OUTPATIENT)
Dept: UROLOGY | Age: 74
End: 2019-12-16
Payer: MEDICARE

## 2019-12-16 VITALS
DIASTOLIC BLOOD PRESSURE: 78 MMHG | WEIGHT: 219 LBS | HEIGHT: 66 IN | BODY MASS INDEX: 35.2 KG/M2 | SYSTOLIC BLOOD PRESSURE: 124 MMHG

## 2019-12-16 DIAGNOSIS — C64.2 RENAL CELL CARCINOMA OF LEFT KIDNEY (HCC): Primary | ICD-10-CM

## 2019-12-16 LAB
BILIRUBIN URINE: NEGATIVE
BLOOD URINE, POC: NEGATIVE
CHARACTER, URINE: CLEAR
COLOR, URINE: YELLOW
GLUCOSE URINE: NEGATIVE MG/DL
KETONES, URINE: NEGATIVE
LEUKOCYTE CLUMPS, URINE: ABNORMAL
NITRITE, URINE: NEGATIVE
PH, URINE: 6.5 (ref 5–9)
PROTEIN, URINE: NEGATIVE MG/DL
SPECIFIC GRAVITY, URINE: 1.01 (ref 1–1.03)
UROBILINOGEN, URINE: 0.2 EU/DL (ref 0–1)

## 2019-12-16 PROCEDURE — 81003 URINALYSIS AUTO W/O SCOPE: CPT | Performed by: PHYSICIAN ASSISTANT

## 2019-12-16 PROCEDURE — 99213 OFFICE O/P EST LOW 20 MIN: CPT | Performed by: PHYSICIAN ASSISTANT

## 2020-01-20 ENCOUNTER — TELEPHONE (OUTPATIENT)
Dept: FAMILY MEDICINE CLINIC | Age: 75
End: 2020-01-20

## 2020-01-20 RX ORDER — RANITIDINE 150 MG/1
150 TABLET ORAL 2 TIMES DAILY
Qty: 60 TABLET | Refills: 5 | Status: SHIPPED | OUTPATIENT
Start: 2020-01-20 | End: 2020-05-13 | Stop reason: ALTCHOICE

## 2020-02-06 ENCOUNTER — TELEPHONE (OUTPATIENT)
Dept: FAMILY MEDICINE CLINIC | Age: 75
End: 2020-02-06

## 2020-02-06 LAB
AVERAGE GLUCOSE: 128 MG/DL
HBA1C MFR BLD: 6.1 % (ref 4.4–6.4)

## 2020-02-06 NOTE — TELEPHONE ENCOUNTER
----- Message from Leroy Orourke MD sent at 2/6/2020  9:46 AM EST -----  a1c is great at 6.1. Continue present and recheck a1c in 6 months with yearly labs.

## 2020-03-20 DIAGNOSIS — Z85.528 HISTORY OF RENAL CELL CANCER: ICD-10-CM

## 2020-05-13 RX ORDER — FAMOTIDINE 20 MG/1
20 TABLET, FILM COATED ORAL 2 TIMES DAILY
Qty: 60 TABLET | Refills: 2 | Status: SHIPPED | OUTPATIENT
Start: 2020-05-13 | End: 2020-06-10 | Stop reason: SDUPTHER

## 2020-06-01 RX ORDER — LORAZEPAM 0.5 MG/1
0.5 TABLET ORAL EVERY 12 HOURS PRN
Qty: 5 TABLET | Refills: 0 | Status: SHIPPED | OUTPATIENT
Start: 2020-06-01 | End: 2020-06-04

## 2020-06-01 NOTE — TELEPHONE ENCOUNTER
Pt notified and states she wants to wait to schedule something after tomorrow bc Cherie Bone is having surgery. Will call back to schedule an appt.

## 2020-06-05 RX ORDER — FAMOTIDINE 20 MG/1
TABLET, FILM COATED ORAL
Qty: 60 TABLET | Refills: 2 | OUTPATIENT
Start: 2020-06-05

## 2020-06-05 NOTE — TELEPHONE ENCOUNTER
Hira Osorio needs refill of   Requested Prescriptions     Pending Prescriptions Disp Refills    famotidine (PEPCID) 20 MG tablet [Pharmacy Med Name: FAMOTIDINE 20 MG TABLET] 60 tablet 2     Sig: TAKE 1 TABLET BY MOUTH TWICE A DAY       Last Filled on:  07/09/2019     Last Visit Date:  7/9/2019    Next Visit Date:    Visit date not found    Annual appointment scheduled

## 2020-06-10 ENCOUNTER — OFFICE VISIT (OUTPATIENT)
Dept: FAMILY MEDICINE CLINIC | Age: 75
End: 2020-06-10
Payer: MEDICARE

## 2020-06-10 VITALS
TEMPERATURE: 98 F | HEART RATE: 68 BPM | HEIGHT: 65 IN | WEIGHT: 220.4 LBS | BODY MASS INDEX: 36.72 KG/M2 | SYSTOLIC BLOOD PRESSURE: 128 MMHG | RESPIRATION RATE: 14 BRPM | DIASTOLIC BLOOD PRESSURE: 74 MMHG

## 2020-06-10 LAB
ALBUMIN SERPL-MCNC: 3.8 G/DL (ref 3.5–5.1)
ALP BLD-CCNC: 117 U/L (ref 38–126)
ALT SERPL-CCNC: 8 U/L (ref 11–66)
ANION GAP SERPL CALCULATED.3IONS-SCNC: 11 MEQ/L (ref 8–16)
AST SERPL-CCNC: 15 U/L (ref 5–40)
AVERAGE GLUCOSE: 126 MG/DL (ref 70–126)
BASOPHILS # BLD: 0.6 %
BASOPHILS ABSOLUTE: 0.1 THOU/MM3 (ref 0–0.1)
BILIRUB SERPL-MCNC: 0.4 MG/DL (ref 0.3–1.2)
BUN BLDV-MCNC: 12 MG/DL (ref 7–22)
CALCIUM SERPL-MCNC: 9.1 MG/DL (ref 8.5–10.5)
CHLORIDE BLD-SCNC: 104 MEQ/L (ref 98–111)
CHOLESTEROL, TOTAL: 183 MG/DL (ref 100–199)
CO2: 27 MEQ/L (ref 23–33)
CREAT SERPL-MCNC: 0.8 MG/DL (ref 0.4–1.2)
EOSINOPHIL # BLD: 1.8 %
EOSINOPHILS ABSOLUTE: 0.2 THOU/MM3 (ref 0–0.4)
ERYTHROCYTE [DISTWIDTH] IN BLOOD BY AUTOMATED COUNT: 15.4 % (ref 11.5–14.5)
ERYTHROCYTE [DISTWIDTH] IN BLOOD BY AUTOMATED COUNT: 51.1 FL (ref 35–45)
GFR SERPL CREATININE-BSD FRML MDRD: 70 ML/MIN/1.73M2
GLUCOSE BLD-MCNC: 116 MG/DL (ref 70–108)
HBA1C MFR BLD: 6.2 % (ref 4.4–6.4)
HCT VFR BLD CALC: 38.4 % (ref 37–47)
HDLC SERPL-MCNC: 55 MG/DL
HEMOGLOBIN: 11.8 GM/DL (ref 12–16)
IMMATURE GRANS (ABS): 0.03 THOU/MM3 (ref 0–0.07)
IMMATURE GRANULOCYTES: 0.3 %
LDL CHOLESTEROL CALCULATED: 98 MG/DL
LYMPHOCYTES # BLD: 36.6 %
LYMPHOCYTES ABSOLUTE: 3.4 THOU/MM3 (ref 1–4.8)
MCH RBC QN AUTO: 28.1 PG (ref 26–33)
MCHC RBC AUTO-ENTMCNC: 30.7 GM/DL (ref 32.2–35.5)
MCV RBC AUTO: 91.4 FL (ref 81–99)
MICROALB/CREAT RATIO POC: ABNORMAL MG/G
MICROALBUMIN/CREAT UR-RTO: 150 MG/L
MONOCYTES # BLD: 6.9 %
MONOCYTES ABSOLUTE: 0.6 THOU/MM3 (ref 0.4–1.3)
NUCLEATED RED BLOOD CELLS: 0 /100 WBC
PLATELET # BLD: 266 THOU/MM3 (ref 130–400)
PMV BLD AUTO: 11.3 FL (ref 9.4–12.4)
POC CREATININE: 300 MG/DL
POTASSIUM SERPL-SCNC: 3.4 MEQ/L (ref 3.5–5.2)
RBC # BLD: 4.2 MILL/MM3 (ref 4.2–5.4)
SEG NEUTROPHILS: 53.8 %
SEGMENTED NEUTROPHILS ABSOLUTE COUNT: 5 THOU/MM3 (ref 1.8–7.7)
SODIUM BLD-SCNC: 142 MEQ/L (ref 135–145)
TOTAL PROTEIN: 6.3 G/DL (ref 6.1–8)
TRIGL SERPL-MCNC: 150 MG/DL (ref 0–199)
WBC # BLD: 9.3 THOU/MM3 (ref 4.8–10.8)

## 2020-06-10 PROCEDURE — 99214 OFFICE O/P EST MOD 30 MIN: CPT | Performed by: FAMILY MEDICINE

## 2020-06-10 PROCEDURE — 82044 UR ALBUMIN SEMIQUANTITATIVE: CPT | Performed by: FAMILY MEDICINE

## 2020-06-10 PROCEDURE — 99397 PER PM REEVAL EST PAT 65+ YR: CPT | Performed by: FAMILY MEDICINE

## 2020-06-10 RX ORDER — DOXEPIN HYDROCHLORIDE 10 MG/1
10 CAPSULE ORAL NIGHTLY
Qty: 30 CAPSULE | Refills: 11 | Status: SHIPPED | OUTPATIENT
Start: 2020-06-10 | End: 2021-06-29 | Stop reason: SDUPTHER

## 2020-06-10 RX ORDER — SIMVASTATIN 40 MG
40 TABLET ORAL NIGHTLY
Qty: 30 TABLET | Refills: 11 | Status: SHIPPED | OUTPATIENT
Start: 2020-06-10 | End: 2021-06-29 | Stop reason: SDUPTHER

## 2020-06-10 RX ORDER — CITALOPRAM 20 MG/1
20 TABLET ORAL 2 TIMES DAILY
Qty: 60 TABLET | Refills: 11 | Status: SHIPPED | OUTPATIENT
Start: 2020-06-10 | End: 2021-06-29 | Stop reason: SDUPTHER

## 2020-06-10 RX ORDER — FAMOTIDINE 20 MG/1
20 TABLET, FILM COATED ORAL 2 TIMES DAILY
Qty: 60 TABLET | Refills: 11 | Status: SHIPPED | OUTPATIENT
Start: 2020-06-10 | End: 2021-06-29 | Stop reason: SDUPTHER

## 2020-06-10 RX ORDER — NYSTATIN 100000 [USP'U]/G
POWDER TOPICAL
Qty: 1 BOTTLE | Refills: 1 | Status: SHIPPED | OUTPATIENT
Start: 2020-06-10 | End: 2020-08-26

## 2020-06-10 RX ORDER — LORAZEPAM 0.5 MG/1
0.5 TABLET ORAL EVERY 12 HOURS PRN
Qty: 60 TABLET | Refills: 2 | Status: SHIPPED | OUTPATIENT
Start: 2020-06-10 | End: 2021-04-23 | Stop reason: SDUPTHER

## 2020-06-10 RX ORDER — BUDESONIDE AND FORMOTEROL FUMARATE DIHYDRATE 160; 4.5 UG/1; UG/1
2 AEROSOL RESPIRATORY (INHALATION) 2 TIMES DAILY
Qty: 1 INHALER | Refills: 11 | Status: SHIPPED | OUTPATIENT
Start: 2020-06-10 | End: 2021-06-29 | Stop reason: SDUPTHER

## 2020-06-10 RX ORDER — OMEPRAZOLE 40 MG/1
40 CAPSULE, DELAYED RELEASE ORAL DAILY
Qty: 30 CAPSULE | Refills: 11 | Status: SHIPPED | OUTPATIENT
Start: 2020-06-10 | End: 2021-06-29 | Stop reason: SDUPTHER

## 2020-06-10 RX ORDER — POLYMYXIN B SULFATE AND TRIMETHOPRIM 1; 10000 MG/ML; [USP'U]/ML
1 SOLUTION OPHTHALMIC EVERY 6 HOURS
Qty: 1 BOTTLE | Refills: 0 | Status: SHIPPED | OUTPATIENT
Start: 2020-06-10 | End: 2020-06-20

## 2020-06-10 RX ORDER — PROPRANOLOL HYDROCHLORIDE 120 MG/1
120 CAPSULE, EXTENDED RELEASE ORAL DAILY
Qty: 30 CAPSULE | Refills: 11 | Status: SHIPPED | OUTPATIENT
Start: 2020-06-10 | End: 2021-06-29 | Stop reason: SDUPTHER

## 2020-06-10 RX ORDER — BUMETANIDE 0.5 MG/1
0.5 TABLET ORAL DAILY
Qty: 30 TABLET | Refills: 11 | Status: SHIPPED | OUTPATIENT
Start: 2020-06-10 | End: 2021-06-29 | Stop reason: SDUPTHER

## 2020-06-10 ASSESSMENT — ENCOUNTER SYMPTOMS
SORE THROAT: 0
EYE PAIN: 0
CHEST TIGHTNESS: 0
SHORTNESS OF BREATH: 0
COUGH: 0
RHINORRHEA: 0
DIARRHEA: 0
WHEEZING: 0
BACK PAIN: 0
ABDOMINAL PAIN: 0
EYE DISCHARGE: 1
CONSTIPATION: 0
NAUSEA: 0
VOMITING: 0
BLOOD IN STOOL: 0

## 2020-06-10 ASSESSMENT — PATIENT HEALTH QUESTIONNAIRE - PHQ9
SUM OF ALL RESPONSES TO PHQ QUESTIONS 1-9: 0
SUM OF ALL RESPONSES TO PHQ9 QUESTIONS 1 & 2: 0
1. LITTLE INTEREST OR PLEASURE IN DOING THINGS: 0
2. FEELING DOWN, DEPRESSED OR HOPELESS: 0
SUM OF ALL RESPONSES TO PHQ QUESTIONS 1-9: 0

## 2020-06-10 NOTE — PATIENT INSTRUCTIONS
decide whether to have this test. Some experts say that men ages 79 and older no longer need testing. · Abdominal aortic aneurysm. Ask your doctor whether you should have a test to check for an aneurysm. You may need a test if you ever smoked or if your parent, brother, sister, or child has had an aneurysm. When should you call for help? Watch closely for changes in your health, and be sure to contact your doctor if you have any problems or symptoms that concern you. Where can you learn more? Go to https://GoNoggingpeMobivox.Jaleva Pharmaceuticals. org and sign in to your [employer] account. Enter V993 in the Matchfund box to learn more about \"Well Visit, Over 65: Care Instructions. \"     If you do not have an account, please click on the \"Sign Up Now\" link. Current as of: August 22, 2019               Content Version: 12.5  © 0173-5733 [employer]Mingle - Share Your Passion!. Care instructions adapted under license by [facility] [address] [facility] ([facility]). If you have questions about a medical condition or this instruction, always ask your healthcare professional. [address] any warranty or liability for your use of this information. Patient Education        High Blood Pressure: Care Instructions  Overview     It's normal for blood pressure to go up and down throughout the day. But if it stays up, you have high blood pressure. Another name for high blood pressure is hypertension. Despite what a lot of people think, high blood pressure usually doesn't cause headaches or make you feel dizzy or lightheaded. It usually has no symptoms. But it does increase your risk of stroke, heart attack, and other problems. You and your doctor will talk about your risks of these problems based on your blood pressure. Your doctor will give you a goal for your blood pressure. Your goal will be based on your health and your age.   Lifestyle changes, such as eating healthy and being active, are always important to help lower blood bananas and oranges), vegetables, legumes, whole grains, and low-fat dairy products. · Lower the amount of saturated fat in your diet. Saturated fat is found in animal products such as milk, cheese, and meat. Limiting these foods may help you lose weight and also lower your risk for heart disease. · Do not smoke. Smoking increases your risk for heart attack and stroke. If you need help quitting, talk to your doctor about stop-smoking programs and medicines. These can increase your chances of quitting for good. When should you call for help? Call  911 anytime you think you may need emergency care. This may mean having symptoms that suggest that your blood pressure is causing a serious heart or blood vessel problem. Your blood pressure may be over 180/120. For example, call 911 if:  · You have symptoms of a heart attack. These may include:  ? Chest pain or pressure, or a strange feeling in the chest.  ? Sweating. ? Shortness of breath. ? Nausea or vomiting. ? Pain, pressure, or a strange feeling in the back, neck, jaw, or upper belly or in one or both shoulders or arms. ? Lightheadedness or sudden weakness. ? A fast or irregular heartbeat. · You have symptoms of a stroke. These may include:  ? Sudden numbness, tingling, weakness, or loss of movement in your face, arm, or leg, especially on only one side of your body. ? Sudden vision changes. ? Sudden trouble speaking. ? Sudden confusion or trouble understanding simple statements. ? Sudden problems with walking or balance. ? A sudden, severe headache that is different from past headaches. · You have severe back or belly pain. Do not wait until your blood pressure comes down on its own. Get help right away. Call your doctor now or seek immediate care if:  · Your blood pressure is much higher than normal (such as 180/120 or higher), but you don't have symptoms.   · You think high blood pressure is causing symptoms, such as:  ? Severe headache.  ? Blurry vision. Watch closely for changes in your health, and be sure to contact your doctor if:  · Your blood pressure measures higher than your doctor recommends at least 2 times. That means the top number is higher or the bottom number is higher, or both. · You think you may be having side effects from your blood pressure medicine. Where can you learn more? Go to https://chpepiceweb.Gateshop. org and sign in to your Tet[employer] account. Enter E151 in the Kredits box to learn more about \"High Blood Pressure: Care Instructions. \"     If you do not have an account, please click on the \"Sign Up Now\" link. Current as of: December 16, 2019               Content Version: 12.5  © 4993-8101 Healthwise, Incorporated. Care instructions adapted under license by [facility] ([facility]). If you have questions about a medical condition or this instruction, always ask your healthcare professional. [place] [address] any warranty or liability for your use of this information. Patient Education        High Cholesterol: Care Instructions  Your Care Instructions     Cholesterol is a type of fat in your blood. It is needed for many body functions, such as making new cells. Cholesterol is made by your body. It also comes from food you eat. High cholesterol means that you have too much of the fat in your blood. This raises your risk of a heart attack and stroke. LDL and HDL are part of your total cholesterol. LDL is the \"bad\" cholesterol. High LDL can raise your risk for heart disease, heart attack, and stroke. HDL is the \"good\" cholesterol. It helps clear bad cholesterol from the body. High HDL is linked with a lower risk of heart disease, heart attack, and stroke. Your cholesterol levels help your doctor find out your risk for having a heart attack or stroke. You and your doctor can talk about whether you need to lower your risk and what treatment is best for you.   A heart-healthy instructions adapted under license by [facility] ([facility]). If you have questions about a medical condition or this instruction, always ask your healthcare professional. [address] any warranty or liability for your use of this information. Patient Education        DASH Diet: Care Instructions  Your Care Instructions     The DASH diet is an eating plan that can help lower your blood pressure. DASH stands for Dietary Approaches to Stop Hypertension. Hypertension is high blood pressure. The DASH diet focuses on eating foods that are high in calcium, potassium, and magnesium. These nutrients can lower blood pressure. The foods that are highest in these nutrients are fruits, vegetables, low-fat dairy products, nuts, seeds, and legumes. But taking calcium, potassium, and magnesium supplements instead of eating foods that are high in those nutrients does not have the same effect. The DASH diet also includes whole grains, fish, and poultry. The DASH diet is one of several lifestyle changes your doctor may recommend to lower your high blood pressure. Your doctor may also want you to decrease the amount of sodium in your diet. Lowering sodium while following the DASH diet can lower blood pressure even further than just the DASH diet alone. Follow-up care is a key part of your treatment and safety. Be sure to make and go to all appointments, and call your doctor if you are having problems. It's also a good idea to know your test results and keep a list of the medicines you take. How can you care for yourself at home? Following the DASH diet  · Eat 4 to 5 servings of fruit each day. A serving is 1 medium-sized piece of fruit, ½ cup chopped or canned fruit, 1/4 cup dried fruit, or 4 ounces (½ cup) of fruit juice. Choose fruit more often than fruit juice. · Eat 4 to 5 servings of vegetables each day.  A serving is 1 cup of lettuce or raw leafy vegetables, ½ cup of chopped or cooked vegetables, or 4 ounces (½ cup) of vegetable juice. Choose vegetables more often than vegetable juice. · Get 2 to 3 servings of low-fat and fat-free dairy each day. A serving is 8 ounces of milk, 1 cup of yogurt, or 1 ½ ounces of cheese. · Eat 6 to 8 servings of grains each day. A serving is 1 slice of bread, 1 ounce of dry cereal, or ½ cup of cooked rice, pasta, or cooked cereal. Try to choose whole-grain products as much as possible. · Limit lean meat, poultry, and fish to 2 servings each day. A serving is 3 ounces, about the size of a deck of cards. · Eat 4 to 5 servings of nuts, seeds, and legumes (cooked dried beans, lentils, and split peas) each week. A serving is 1/3 cup of nuts, 2 tablespoons of seeds, or ½ cup of cooked beans or peas. · Limit fats and oils to 2 to 3 servings each day. A serving is 1 teaspoon of vegetable oil or 2 tablespoons of salad dressing. · Limit sweets and added sugars to 5 servings or less a week. A serving is 1 tablespoon jelly or jam, ½ cup sorbet, or 1 cup of lemonade. · Eat less than 2,300 milligrams (mg) of sodium a day. If you limit your sodium to 1,500 mg a day, you can lower your blood pressure even more. Tips for success  · Start small. Do not try to make dramatic changes to your diet all at once. You might feel that you are missing out on your favorite foods and then be more likely to not follow the plan. Make small changes, and stick with them. Once those changes become habit, add a few more changes. · Try some of the following:  ? Make it a goal to eat a fruit or vegetable at every meal and at snacks. This will make it easy to get the recommended amount of fruits and vegetables each day. ? Try yogurt topped with fruit and nuts for a snack or healthy dessert. ? Add lettuce, tomato, cucumber, and onion to sandwiches. ? Combine a ready-made pizza crust with low-fat mozzarella cheese and lots of vegetable toppings.  Try using tomatoes, squash, spinach, broccoli, carrots, medicine. Follow-up care is a key part of your treatment and safety. Be sure to make and go to all appointments. Call your doctor if you are having problems. It's also a good idea to know your test results and keep a list of the medicines you take. How can you care for yourself at home? · Keep your blood sugar at a target level (which you set with your doctor). ? Eat a good diet that spreads carbohydrate throughout the day. Carbohydrate--the body's main source of fuel--affects blood sugar more than any other nutrient. Carbohydrate is in fruits, vegetables, milk, and yogurt. It also is in breads, cereals, vegetables such as potatoes and corn, and sugary foods such as candy and cakes. ? Aim for 30 minutes of exercise on most, preferably all, days of the week. Walking is a good choice. You also may want to do other activities, such as running, swimming, cycling, or playing tennis or team sports. If your doctor says it's okay, do muscle-strengthening exercises at least 2 times a week. ? Take your medicines exactly as prescribed. Call your doctor if you think you are having a problem with your medicine. You will get more details on the specific medicines your doctor prescribes. · Check your blood sugar as often as your doctor recommends. It is important to keep track of any symptoms you have, such as low blood sugar. Also tell your doctor if you have any changes in your activities, diet, or insulin use. · Talk to your doctor before you start taking aspirin every day. Aspirin can help certain people lower their risk of a heart attack or stroke. But taking aspirin isn't right for everyone, because it can cause serious bleeding. · Do not smoke. If you need help quitting, talk to your doctor about stop-smoking programs and medicines. These can increase your chances of quitting for good. · Keep your cholesterol and blood pressure at normal levels. You may need to take one or more medicines to reach your goals.  Take

## 2020-06-11 ENCOUNTER — TELEPHONE (OUTPATIENT)
Dept: FAMILY MEDICINE CLINIC | Age: 75
End: 2020-06-11

## 2020-06-11 NOTE — TELEPHONE ENCOUNTER
Patient notified of lab results, to increase potassium in diet and recheck K in 2 weeks, if still low, will add supplement. pt scheduled to recheck K level in 2 weeks.   Lab order mailed to pt to recheck a1c in 6 months

## 2020-06-24 ENCOUNTER — NURSE ONLY (OUTPATIENT)
Dept: FAMILY MEDICINE CLINIC | Age: 75
End: 2020-06-24
Payer: MEDICARE

## 2020-06-24 LAB — POTASSIUM SERPL-SCNC: 3.6 MEQ/L (ref 3.5–5.2)

## 2020-06-24 PROCEDURE — 36415 COLL VENOUS BLD VENIPUNCTURE: CPT | Performed by: FAMILY MEDICINE

## 2020-06-25 ENCOUNTER — TELEPHONE (OUTPATIENT)
Dept: FAMILY MEDICINE CLINIC | Age: 75
End: 2020-06-25

## 2020-07-10 ENCOUNTER — OFFICE VISIT (OUTPATIENT)
Dept: CARDIOLOGY CLINIC | Age: 75
End: 2020-07-10
Payer: MEDICARE

## 2020-07-10 VITALS
WEIGHT: 221 LBS | DIASTOLIC BLOOD PRESSURE: 76 MMHG | SYSTOLIC BLOOD PRESSURE: 138 MMHG | BODY MASS INDEX: 36.82 KG/M2 | HEART RATE: 75 BPM | HEIGHT: 65 IN

## 2020-07-10 PROCEDURE — 99213 OFFICE O/P EST LOW 20 MIN: CPT | Performed by: NUCLEAR MEDICINE

## 2020-07-10 PROCEDURE — 93000 ELECTROCARDIOGRAM COMPLETE: CPT | Performed by: NUCLEAR MEDICINE

## 2020-07-10 RX ORDER — POLYMYXIN B SULFATE AND TRIMETHOPRIM 1; 10000 MG/ML; [USP'U]/ML
1 SOLUTION OPHTHALMIC EVERY 4 HOURS
COMMUNITY
End: 2020-08-28

## 2020-07-10 NOTE — PROGRESS NOTES
[address] [facility],[place] [id number]  Dept: [phone]  Dept Fax: [phone]  Loc: [phone]    Visit Date: 7/10/2020    Darrick Coyle is a 77 y.o. female who presents todayfor:  Chief Complaint   Patient presents with    Check-Up    Coronary Artery Disease    Hypertension    Hyperlipidemia   known mild CAD  No chest pain   Some more dyspnea than usual   Cath a while back   Bs is so so   BP is stable  Some eye issues  Does have renal cancer  In remission   No other issues       HPI:  HPI  Past Medical History:   Diagnosis Date    Anxiety     Arthritis     Asthma     Cancer ([address]     Depression     Diabetes mellitus ([address]     Headache(784.0)     Hyperlipidemia     Hypertension     Nausea & vomiting     Persistent proteinuria associated with type 2 diabetes mellitus ([address]     Renal cell carcinoma of left kidney ([facility] [address] [facility] 12/6/2014    Unspecified cerebral artery occlusion with cerebral infarction       Past Surgical History:   Procedure Laterality Date    BREAST BIOPSY      CARDIAC CATHETERIZATION      CHOLECYSTECTOMY      COLONOSCOPY      ENDOSCOPY, COLON, DIAGNOSTIC      FINGER SURGERY  tumor    HERNIA REPAIR      HIATAL HERNIA REPAIR  7-    stomach repair    PARTIAL NEPHRECTOMY Left 11/21/2014    robotic left partial nephrectomy    TONSILLECTOMY      TUBAL LIGATION       Family History   Problem Relation Age of Onset    Cancer Mother     Heart Disease Mother     High Blood Pressure Mother     High Cholesterol Mother     Stroke Mother     Cancer Father     Heart Disease Father     High Blood Pressure Father     High Cholesterol Father     Cancer Sister      Social History     Tobacco Use    Smoking status: Never Smoker    Smokeless tobacco: Never Used   Substance Use Topics    Alcohol use: No      Current Outpatient Medications   Medication Sig Dispense Refill    trimethoprim-polymyxin b fexofenadine (ALLEGRA) 180 MG tablet Take 180 mg by mouth daily.  Multiple Vitamin (MULTI-VITAMINS PO) Take  by mouth daily. No current facility-administered medications for this visit. Facility-Administered Medications Ordered in Other Visits   Medication Dose Route Frequency Provider Last Rate Last Dose    cefOXitin (MEFOXIN) 2 g in dextrose 5% 50 mL (mini-bag)  2 g Intravenous Once Chevy Mendoza MD         Allergies   Allergen Reactions    Latex Hives     Patient noted in preop that she might be allergic - maybe breaking out when exposed.     Demerol Hcl [Meperidine] Nausea And Vomiting     confusion    Propoxyphene     Tape Lowanda [place] Tape]      Health Maintenance   Topic Date Due    DTaP/Tdap/Td vaccine (1 - Tdap) 01/31/1965    Shingles Vaccine (1 of 2) 01/31/1996    Annual Wellness Visit (AWV)  05/29/2019    Diabetic retinal exam  10/20/2019    Flu vaccine (1) 09/01/2020    Colon cancer screen colonoscopy  10/15/2020    Diabetic foot exam  06/10/2021    A1C test (Diabetic or Prediabetic)  06/10/2021    Diabetic microalbuminuria test  06/10/2021    Lipid screen  06/10/2021    Creatinine monitoring  06/10/2021    Potassium monitoring  06/24/2021    Breast cancer screen  08/23/2021    DEXA (modify frequency per FRAX score)  Completed    Pneumococcal 65+ years Vaccine  Completed    Hepatitis C screen  Completed    Hepatitis A vaccine  Aged Out    Hib vaccine  Aged Out    Meningococcal (ACWY) vaccine  Aged Out       Subjective:  Review of Systems  General:   No fever, no chills, some fatigue or weight loss  Pulmonary:    some more dyspnea, no wheezing  Cardiac:    Denies recent chest pain,   GI:     No nausea or vomiting, no abdominal pain  Neuro:    No dizziness or light headedness,   Musculoskeletal:  No recent active issues  Extremities:   No edema, no obvious claudication       Objective:  Physical Exam  /76   Pulse 75   Ht 5' 5\" (1.651 m)   Wt 221 lb (100.2

## 2020-07-23 ENCOUNTER — HOSPITAL ENCOUNTER (OUTPATIENT)
Dept: NON INVASIVE DIAGNOSTICS | Age: 75
Discharge: HOME | End: 2020-07-23
Payer: MEDICARE

## 2020-07-23 VITALS — HEIGHT: 65 IN | WEIGHT: 221 LBS | BODY MASS INDEX: 36.82 KG/M2

## 2020-07-23 LAB
LV EF: 55 %
LVEF MODALITY: NORMAL

## 2020-07-23 PROCEDURE — 6360000002 HC RX W HCPCS

## 2020-07-23 PROCEDURE — A9500 TC99M SESTAMIBI: HCPCS | Performed by: NUCLEAR MEDICINE

## 2020-07-23 PROCEDURE — 3430000000 HC RX DIAGNOSTIC RADIOPHARMACEUTICAL: Performed by: NUCLEAR MEDICINE

## 2020-07-23 PROCEDURE — 93306 TTE W/DOPPLER COMPLETE: CPT

## 2020-07-23 PROCEDURE — 78452 HT MUSCLE IMAGE SPECT MULT: CPT

## 2020-07-23 PROCEDURE — 93017 CV STRESS TEST TRACING ONLY: CPT | Performed by: NUCLEAR MEDICINE

## 2020-07-23 RX ADMIN — Medication 9.1 MILLICURIE: at 11:18

## 2020-07-23 RX ADMIN — Medication 31.8 MILLICURIE: at 12:06

## 2020-08-24 ENCOUNTER — HOSPITAL ENCOUNTER (OUTPATIENT)
Dept: WOMENS IMAGING | Age: 75
Discharge: HOME | End: 2020-08-24
Payer: MEDICARE

## 2020-08-24 PROCEDURE — 77063 BREAST TOMOSYNTHESIS BI: CPT

## 2020-08-26 RX ORDER — NYSTATIN 100000 [USP'U]/G
POWDER TOPICAL
Qty: 45 G | Refills: 5 | Status: SHIPPED | OUTPATIENT
Start: 2020-08-26 | End: 2020-09-02

## 2020-08-26 NOTE — TELEPHONE ENCOUNTER
Gale Shen needs refill of   Requested Prescriptions     Pending Prescriptions Disp Refills    nystatin (MYCOSTATIN) [id number] UNIT/GM powder [Pharmacy Med Name: Katya Washington 100,000 UNIT/GM POWD] 15 g 1     Sig: APPLY TO AFFECTED AREA 3 TIMES A DAY       Last Filled on:  6/10/20    Last Visit Date:  6/10/2020-annual     Next Visit Date:    9/9/2020

## 2020-08-28 ENCOUNTER — HOSPITAL ENCOUNTER (EMERGENCY)
Age: 75
Discharge: HOME | End: 2020-08-28
Attending: EMERGENCY MEDICINE
Payer: MEDICARE

## 2020-08-28 VITALS
WEIGHT: 221 LBS | HEIGHT: 65 IN | HEART RATE: 77 BPM | OXYGEN SATURATION: 96 % | RESPIRATION RATE: 18 BRPM | DIASTOLIC BLOOD PRESSURE: 91 MMHG | SYSTOLIC BLOOD PRESSURE: 159 MMHG | TEMPERATURE: 97.8 F | BODY MASS INDEX: 36.82 KG/M2

## 2020-08-28 PROCEDURE — 99282 EMERGENCY DEPT VISIT SF MDM: CPT

## 2020-08-28 PROCEDURE — 99281 EMR DPT VST MAYX REQ PHY/QHP: CPT

## 2020-08-28 RX ORDER — FLUCONAZOLE 100 MG/1
200 TABLET ORAL WEEKLY
Qty: 8 TABLET | Refills: 0 | Status: SHIPPED | OUTPATIENT
Start: 2020-08-28 | End: 2020-09-02 | Stop reason: SDUPTHER

## 2020-08-28 RX ORDER — SULFACETAMIDE SODIUM 100 MG/ML
2 SOLUTION/ DROPS OPHTHALMIC 4 TIMES DAILY
Qty: 1 BOTTLE | Refills: 0 | Status: SHIPPED | OUTPATIENT
Start: 2020-08-28 | End: 2020-09-07

## 2020-08-28 ASSESSMENT — PAIN DESCRIPTION - DESCRIPTORS: DESCRIPTORS: BURNING

## 2020-08-28 ASSESSMENT — ENCOUNTER SYMPTOMS
COUGH: 0
BLOOD IN STOOL: 0
SINUS PAIN: 0
RECTAL PAIN: 0
SINUS PRESSURE: 0
ABDOMINAL PAIN: 0
EYE PAIN: 0
DIARRHEA: 0
CONSTIPATION: 0
SHORTNESS OF BREATH: 0
CHEST TIGHTNESS: 0
NAUSEA: 0
BACK PAIN: 0

## 2020-08-28 ASSESSMENT — PAIN DESCRIPTION - ORIENTATION: ORIENTATION: RIGHT;LEFT

## 2020-08-28 ASSESSMENT — PAIN SCALES - GENERAL: PAINLEVEL_OUTOF10: 8

## 2020-08-28 ASSESSMENT — PAIN DESCRIPTION - PAIN TYPE: TYPE: ACUTE PAIN

## 2020-08-28 ASSESSMENT — PAIN DESCRIPTION - LOCATION: LOCATION: GROIN

## 2020-08-28 NOTE — ED PROVIDER NOTES
[facility] ENCOUNTER          Pt Name: Anais oFrde  MRN: [id number]  [place] 1945  Date of evaluation: 8/28/2020  Physician: Aurora Fishman MD, Valeriano Saravia, [address] [place] [address]       Chief Complaint   Patient presents with    Groin Pain    Rash     History obtained from chart review and the patient. HISTORY OF PRESENT ILLNESS    HPI  Anais Forde is a 77 y.o. female who presents to the emergency department for evaluation of painful and pruritic rash to bilateral inguinal area. Patient also states she has been waking up with discharge of the left eye. Patient saw her primary care doctor 4 weeks ago for the symptoms and was prescribed nystatin powder, which she has been applying with little relief. The patient has no other acute complaints at this time. REVIEW OF SYSTEMS   Review of Systems   Constitutional: Negative for chills, fever and unexpected weight change. HENT: Negative for congestion, ear pain, nosebleeds, sinus pressure and sinus pain. Eyes: Negative for pain. Respiratory: Negative for cough, chest tightness and shortness of breath. Cardiovascular: Negative for chest pain. Gastrointestinal: Negative for abdominal pain, blood in stool, constipation, diarrhea, nausea and rectal pain. Endocrine: Negative for polydipsia and polyuria. Genitourinary: Negative for dysuria and flank pain. Musculoskeletal: Negative for arthralgias, back pain, myalgias and neck pain. Skin: Positive for rash. Neurological: Negative for tremors, seizures, weakness and headaches. All other systems reviewed and are negative.         PAST MEDICAL AND SURGICAL HISTORY     Past Medical History:   Diagnosis Date    Anxiety     Arthritis     Asthma     Cancer ([facility]     Depression     Diabetes mellitus ([facility]     Headache(784.0)     Hyperlipidemia     Hypertension     Nausea & vomiting     Persistent proteinuria associated with type 2 diabetes mellitus [facility]     Renal cell carcinoma of left kidney ([facility] 12/6/2014    Unspecified cerebral artery occlusion with cerebral infarction      Past Surgical History:   Procedure Laterality Date    BREAST BIOPSY      CARDIAC CATHETERIZATION      CHOLECYSTECTOMY      COLONOSCOPY      ENDOSCOPY, COLON, DIAGNOSTIC      FINGER SURGERY  tumor    HERNIA REPAIR      HIATAL HERNIA REPAIR  7-    stomach repair    PARTIAL NEPHRECTOMY Left 11/21/2014    robotic left partial nephrectomy    TONSILLECTOMY      TUBAL LIGATION           MEDICATIONS   No current facility-administered medications for this encounter.      Current Outpatient Medications:     sulfacetamide (BLEPH-10) 10 % ophthalmic solution, Place 2 drops into the left eye 4 times daily for 10 days, Disp: 1 Bottle, Rfl: 0    fluconazole (DIFLUCAN) 100 MG tablet, Take 2 tablets by mouth once a week for 4 doses, Disp: 8 tablet, Rfl: 0    nystatin (MYCOSTATIN) 834763 UNIT/GM powder, APPLY TO AFFECTED AREA 3 TIMES A DAY, Disp: 45 g, Rfl: 5    bumetanide (BUMEX) 0.5 MG tablet, Take 1 tablet by mouth daily, Disp: 30 tablet, Rfl: 11    omeprazole (PRILOSEC) 40 MG delayed release capsule, Take 1 capsule by mouth daily, Disp: 30 capsule, Rfl: 11    doxepin (SINEQUAN) 10 MG capsule, Take 1 capsule by mouth nightly, Disp: 30 capsule, Rfl: 11    simvastatin (ZOCOR) 40 MG tablet, Take 1 tablet by mouth nightly, Disp: 30 tablet, Rfl: 11    citalopram (CELEXA) 20 MG tablet, Take 1 tablet by mouth 2 times daily, Disp: 60 tablet, Rfl: 11    propranolol (INDERAL LA) 120 MG extended release capsule, Take 1 capsule by mouth daily, Disp: 30 capsule, Rfl: 11    budesonide-formoterol (SYMBICORT) 160-4.5 MCG/ACT AERO, Inhale 2 puffs into the lungs 2 times daily, Disp: 1 Inhaler, Rfl: 11    ipratropium (ATROVENT) 0.02 % nebulizer solution, Take 2.5 mLs by nebulization 4 times daily, Disp: 300 mL, Rfl: 3    VENTOLIN  (90 Base) MCG/ACT inhaler, INHALE 2 PUFFS BY MOUTH 4 TIMES A DAY AS NEEDED, Disp: 1 Inhaler, Rfl: 11    famotidine (PEPCID) 20 MG tablet, Take 1 tablet by mouth 2 times daily, Disp: 60 tablet, Rfl: 11    nystatin (MYCOSTATIN) 851660 UNIT/ML suspension, Take 1 tsp by mouth swish and swallow four times daily for 10 days. , Disp: 200 mL, Rfl: 0    nitroGLYCERIN (NITROSTAT) 0.4 MG SL tablet, Place 1 tablet under the tongue every 5 minutes as needed for Chest pain, Disp: 25 tablet, Rfl: 1    Probiotic Product ([employer] PROBIOTIC) CAPS, Take by mouth, Disp: , Rfl:     oxybutynin (DITROPAN-XL) 5 MG extended release tablet, Take 5 mg by mouth daily, Disp: , Rfl:     diclofenac sodium 1 % GEL, Apply 2 g topically 4 times daily, Disp: 2 Tube, Rfl: 1    acetaminophen (TYLENOL) 500 MG tablet, Take 500 mg by mouth every 6 hours as needed for Pain, Disp: , Rfl:     Cholecalciferol (VITAMIN D3) 5000 units TABS, Take by mouth daily , Disp: , Rfl:     aspirin 81 MG tablet, Take 81 mg by mouth daily, Disp: , Rfl:     fexofenadine (ALLEGRA) 180 MG tablet, Take 180 mg by mouth daily. , Disp: , Rfl:     Multiple Vitamin (MULTI-VITAMINS PO), Take  by mouth daily. , Disp: , Rfl:     Facility-Administered Medications Ordered in Other Encounters:     cefOXitin (MEFOXIN) 2 g in dextrose 5% 50 mL (mini-bag), 2 g, Intravenous, Once, Duane Mood, MD      SOCIAL HISTORY     Social History     Social History Narrative    Not on file     Social History     Tobacco Use    Smoking status: Never Smoker    Smokeless tobacco: Never Used   Substance Use Topics    Alcohol use: No    Drug use: No         ALLERGIES     Allergies   Allergen Reactions    Latex Hives     Patient noted in preop that she might be allergic - maybe breaking out when exposed.     Demerol Hcl [Meperidine] Nausea And Vomiting     confusion    Propoxyphene     Tape Yvonnie Paci Tape]          FAMILY HISTORY     Family History   Problem Relation Age of Onset    Cancer Mother symptoms. .  Plan: Systemic antifungals, topical antibiotics on left eye, PCP follow-up. ED RESULTS   Laboratory results:  Labs Reviewed - No data to display    Radiologic studies results:  No orders to display       ED Medications administered this visit: Medications - No data to display      ED COURSE      Strict return precautions and follow up instructions were discussed with the patient prior to discharge, with which the patient agrees. MEDICATION CHANGES     New Prescriptions    FLUCONAZOLE (DIFLUCAN) 100 MG TABLET    Take 2 tablets by mouth once a week for 4 doses    SULFACETAMIDE (BLEPH-10) 10 % OPHTHALMIC SOLUTION    Place 2 drops into the left eye 4 times daily for 10 days         FINAL DISPOSITION     Final diagnoses:   Tinea cruris   Acute bacterial conjunctivitis of left eye     Condition: condition: good  Dispo: Discharge to home      This transcription was electronically signed. It was dictated by use of voice recognition software and electronically transcribed. The transcription may contain errors not detected in proofreading.         Iker Duenas MD  08/28/20 1670

## 2020-08-29 ENCOUNTER — HOSPITAL ENCOUNTER (EMERGENCY)
Age: 75
Discharge: HOME | End: 2020-08-29
Attending: EMERGENCY MEDICINE
Payer: MEDICARE

## 2020-08-29 VITALS
OXYGEN SATURATION: 95 % | TEMPERATURE: 97.8 F | RESPIRATION RATE: 19 BRPM | SYSTOLIC BLOOD PRESSURE: 164 MMHG | HEART RATE: 69 BPM | DIASTOLIC BLOOD PRESSURE: 84 MMHG

## 2020-08-29 PROCEDURE — 6370000000 HC RX 637 (ALT 250 FOR IP): Performed by: STUDENT IN AN ORGANIZED HEALTH CARE EDUCATION/TRAINING PROGRAM

## 2020-08-29 PROCEDURE — 99282 EMERGENCY DEPT VISIT SF MDM: CPT

## 2020-08-29 PROCEDURE — 99283 EMERGENCY DEPT VISIT LOW MDM: CPT

## 2020-08-29 RX ORDER — NYSTATIN 100000 U/G
CREAM TOPICAL 2 TIMES DAILY
Status: DISCONTINUED | OUTPATIENT
Start: 2020-08-29 | End: 2020-08-29 | Stop reason: HOSPADM

## 2020-08-29 RX ORDER — ACETAMINOPHEN 325 MG/1
650 TABLET ORAL ONCE
Status: COMPLETED | OUTPATIENT
Start: 2020-08-29 | End: 2020-08-29

## 2020-08-29 RX ADMIN — ACETAMINOPHEN 650 MG: 325 TABLET ORAL at 21:16

## 2020-08-29 ASSESSMENT — ENCOUNTER SYMPTOMS
DIARRHEA: 0
SHORTNESS OF BREATH: 0
COUGH: 0
SINUS PAIN: 0
NAUSEA: 0
VOMITING: 0
EYE REDNESS: 0
SORE THROAT: 0
RHINORRHEA: 0
BACK PAIN: 0
ABDOMINAL PAIN: 0

## 2020-08-29 ASSESSMENT — PAIN DESCRIPTION - PAIN TYPE: TYPE: ACUTE PAIN

## 2020-08-29 ASSESSMENT — PAIN DESCRIPTION - DESCRIPTORS: DESCRIPTORS: BURNING

## 2020-08-29 ASSESSMENT — PAIN SCALES - GENERAL: PAINLEVEL_OUTOF10: 3

## 2020-08-29 ASSESSMENT — PAIN DESCRIPTION - LOCATION: LOCATION: GROIN

## 2020-08-29 NOTE — ED PROVIDER NOTES
Psychiatric/Behavioral: Negative for agitation.          PAST MEDICAL AND SURGICAL HISTORY     Past Medical History:   Diagnosis Date    Anxiety     Arthritis     Asthma     [facility] ([facility]     Depression     Diabetes mellitus ([facility] [address] [facility]     Headache(784.0)     Hyperlipidemia     Hypertension     Nausea & vomiting     Persistent proteinuria associated with type 2 diabetes mellitus ([facility] [address] [facility]     Renal cell carcinoma of left kidney ([facility] [address] [facility] 12/6/2014    Unspecified cerebral artery occlusion with cerebral infarction      Past Surgical History:   Procedure Laterality Date    BREAST BIOPSY      CARDIAC CATHETERIZATION      CHOLECYSTECTOMY      COLONOSCOPY      ENDOSCOPY, COLON, DIAGNOSTIC      FINGER SURGERY  tumor    HERNIA REPAIR      HIATAL HERNIA REPAIR  7-    stomach repair    PARTIAL NEPHRECTOMY Left 11/21/2014    robotic left partial nephrectomy    TONSILLECTOMY      TUBAL LIGATION           MEDICATIONS     Current Facility-Administered Medications:     nystatin (MYCOSTATIN) cream, , Topical, BID, Antonette Nj MD    Current Outpatient Medications:     sulfacetamide (BLEPH-10) 10 % ophthalmic solution, Place 2 drops into the left eye 4 times daily for 10 days, Disp: 1 Bottle, Rfl: 0    fluconazole (DIFLUCAN) 100 MG tablet, Take 2 tablets by mouth once a week for 4 doses, Disp: 8 tablet, Rfl: 0    nystatin (MYCOSTATIN) 832729 UNIT/GM powder, APPLY TO AFFECTED AREA 3 TIMES A DAY, Disp: 45 g, Rfl: 5    bumetanide (BUMEX) 0.5 MG tablet, Take 1 tablet by mouth daily, Disp: 30 tablet, Rfl: 11    omeprazole (PRILOSEC) 40 MG delayed release capsule, Take 1 capsule by mouth daily, Disp: 30 capsule, Rfl: 11    doxepin (SINEQUAN) 10 MG capsule, Take 1 capsule by mouth nightly, Disp: 30 capsule, Rfl: 11    simvastatin (ZOCOR) 40 MG tablet, Take 1 tablet by mouth nightly, Disp: 30 tablet, Rfl: 11    citalopram (CELEXA) 20 MG tablet, Take 1 tablet by mouth 2 times daily, Disp: 60 tablet, Rfl: 11    Alcohol use: No    Drug use: No         ALLERGIES     Allergies   Allergen Reactions    Latex Hives     Patient noted in preop that she might be allergic - maybe breaking out when exposed.  Demerol Hcl [Meperidine] Nausea And Vomiting     confusion    Propoxyphene     Tape Linden Cork Tape]          FAMILY HISTORY     Family History   Problem Relation Age of Onset    Cancer Mother     Heart Disease Mother     High Blood Pressure Mother     High Cholesterol Mother     Stroke Mother     Cancer Father     Heart Disease Father     High Blood Pressure Father     High Cholesterol Father     Cancer Sister          PREVIOUS RECORDS   Previous records reviewed: Patient was seen here yesterday for similar complaints      PHYSICAL EXAM     ED Triage Vitals   BP Temp Temp src Pulse Resp SpO2 Height Weight   08/29/20 1847 08/29/20 1846 -- 08/29/20 1846 08/29/20 1846 08/29/20 1846 -- --   (!) 180/83 97.8 °F (36.6 °C)  71 20 95 %       Initial vital signs and nursing assessment reviewed and normal. Pulsoximetry is normal per my interpretation. Additional Vital Signs:  Vitals:    08/29/20 2058   BP: (!) 164/84   Pulse: 69   Resp: 19   Temp:    SpO2: 95%       Physical Exam  Vitals signs and nursing note reviewed. Constitutional:       General: She is not in acute distress. Appearance: Normal appearance. She is not ill-appearing. HENT:      Head: Normocephalic and atraumatic. Right Ear: External ear normal.      Left Ear: External ear normal.      Nose: Nose normal.      Mouth/Throat:      Mouth: Mucous membranes are moist.      Pharynx: Oropharynx is clear. Eyes:      General: No scleral icterus. Conjunctiva/sclera: Conjunctivae normal.   Neck:      Musculoskeletal: Normal range of motion and neck supple. Cardiovascular:      Rate and Rhythm: Normal rate and regular rhythm. Pulses: Normal pulses. Heart sounds: Normal heart sounds.    Pulmonary:      Effort: Pulmonary effort is normal.      Breath sounds: Normal breath sounds. Abdominal:      General: Abdomen is flat. There is no distension. Palpations: Abdomen is soft. Tenderness: There is abdominal tenderness. There is no guarding or rebound. Comments: Some tenderness to palpation of right lower abdomen over her rash. Musculoskeletal: Normal range of motion. Skin:     General: Skin is warm. Capillary Refill: Capillary refill takes less than 2 seconds. Comments: Erythematous excoriated rash extending from the right groin upwards to right lower abdomen in the intra-abdominal fold. Nystatin powder noted across rash. Tender to the touch. No discharge or fluctuance noted   Neurological:      General: No focal deficit present. Mental Status: She is alert and oriented to person, place, and time. Psychiatric:         Mood and Affect: Mood normal.         Behavior: Behavior normal.             MEDICAL DECISION MAKING   Initial Assessment: This is a 79-year-old female with a candidiasis rash that was diagnosed by her primary care physician 4 weeks ago and intermittently improved upon nystatin and has returned over the past 3 days. Patient was seen yesterday and was discharged home on fluconazole orally and presents today because the rash worsened minimally. Differential Diagnosis: Candidal rash, tinea cruris, dermatitis    Plan: We will have patient continue the medications were prescribed to her yesterday. Will advise patient to keep area dry and exposed area.   Will discharge her home for further follow-up with her primary care physician the next week        ED RESULTS   Laboratory results:  Labs Reviewed - No data to display    Radiologic studies results:  No orders to display       ED Medications administered this visit:   Medications   nystatin (MYCOSTATIN) cream (has no administration in time range)   acetaminophen (TYLENOL) tablet 650 mg (650 mg Oral Given 8/29/20 2116)         ED COURSE Strict return precautions and follow up instructions were discussed with the patient prior to discharge, with which the patient agrees. MEDICATION CHANGES     New Prescriptions    No medications on file         FINAL DISPOSITION     Final diagnoses:   Candida rash of groin     Condition: condition: good  Dispo: Discharge to home      This transcription was electronically signed. Parts of this transcriptions may have been dictated by use of voice recognition software and electronically transcribed, and parts may have been transcribed with the assistance of an ED scribe. The transcription may contain errors not detected in proofreading. Please refer to my supervising physician's documentation if my documentation differs.     Electronically Signed: Casey Batista, 08/29/20, 9:29 PM         Casey Batista MD  Resident  08/29/20 4413

## 2020-08-31 ENCOUNTER — TELEPHONE (OUTPATIENT)
Dept: FAMILY MEDICINE CLINIC | Age: 75
End: 2020-08-31

## 2020-08-31 NOTE — LETTER
August 31, 2020    [place] [facility] [id number]    Dear Kaye Caputo,    This letter is regarding your Emergency Department (ED) visit at [address] [address] on 8/29/20. Dr.Ronald AMARI Giron wanted to make sure that you understand your discharge instructions and that you were able to fill any prescriptions that may have been ordered for you. Please contact the office at the above phone number if the ED advised you to follow up with Wilian Echevarria, or if you have any further questions or needs. Also did you know -   *Visiting the ED for a non-emergency could result in higher co-pays than you would normally be subject to paying? [facility]) practices can often offer you an appointment on the same day that you call for acute issues. *We have some [facility] offices that offer Walk-in appointments; check our website for availability in your community, [email or web address].      *Evisits are now available for patients for through [address] If you do not have MyChart and are interested, please contact the office and a staff member may assist you or go to [email or web address].     Sincerely,   Shonna Valle MD and your [facility]

## 2020-09-02 ENCOUNTER — OFFICE VISIT (OUTPATIENT)
Dept: FAMILY MEDICINE CLINIC | Age: 75
End: 2020-09-02
Payer: MEDICARE

## 2020-09-02 VITALS
TEMPERATURE: 97.9 F | SYSTOLIC BLOOD PRESSURE: 132 MMHG | HEART RATE: 84 BPM | DIASTOLIC BLOOD PRESSURE: 86 MMHG | RESPIRATION RATE: 16 BRPM | BODY MASS INDEX: 36.79 KG/M2 | HEIGHT: 65 IN | WEIGHT: 220.8 LBS

## 2020-09-02 PROCEDURE — G0438 PPPS, INITIAL VISIT: HCPCS | Performed by: FAMILY MEDICINE

## 2020-09-02 PROCEDURE — 99213 OFFICE O/P EST LOW 20 MIN: CPT | Performed by: FAMILY MEDICINE

## 2020-09-02 RX ORDER — NYSTATIN 100000 U/G
CREAM TOPICAL
Qty: 30 G | Refills: 2 | Status: SHIPPED | OUTPATIENT
Start: 2020-09-02 | End: 2021-01-08 | Stop reason: SDUPTHER

## 2020-09-02 RX ORDER — FLUCONAZOLE 100 MG/1
100 TABLET ORAL DAILY
Qty: 15 TABLET | Refills: 0 | Status: SHIPPED | OUTPATIENT
Start: 2020-09-02 | End: 2020-10-16

## 2020-09-02 ASSESSMENT — PATIENT HEALTH QUESTIONNAIRE - PHQ9
SUM OF ALL RESPONSES TO PHQ QUESTIONS 1-9: 0
SUM OF ALL RESPONSES TO PHQ QUESTIONS 1-9: 0

## 2020-09-02 ASSESSMENT — ENCOUNTER SYMPTOMS
CONSTIPATION: 0
COUGH: 0
ABDOMINAL PAIN: 0
RHINORRHEA: 0
EYE PAIN: 0
CHEST TIGHTNESS: 0
SHORTNESS OF BREATH: 0
DIARRHEA: 0
BACK PAIN: 0
VOMITING: 0
BLOOD IN STOOL: 0
SORE THROAT: 0
WHEEZING: 0
NAUSEA: 0

## 2020-09-02 ASSESSMENT — LIFESTYLE VARIABLES: HOW OFTEN DO YOU HAVE A DRINK CONTAINING ALCOHOL: 0

## 2020-09-02 NOTE — PATIENT INSTRUCTIONS
Personalized Preventive Plan for Yamileth Veloz - 9/2/2020  Medicare offers a range of preventive health benefits. Some of the tests and screenings are paid in full while other may be subject to a deductible, co-insurance, and/or copay. Some of these benefits include a comprehensive review of your medical history including lifestyle, illnesses that may run in your family, and various assessments and screenings as appropriate. After reviewing your medical record and screening and assessments performed today your provider may have ordered immunizations, labs, imaging, and/or referrals for you. A list of these orders (if applicable) as well as your Preventive Care list are included within your After Visit Summary for your review. Other Preventive Recommendations:    · A preventive eye exam performed by an eye specialist is recommended every 1-2 years to screen for glaucoma; cataracts, macular degeneration, and other eye disorders. · A preventive dental visit is recommended every 6 months. · Try to get at least 150 minutes of exercise per week or 10,000 steps per day on a pedometer . · Order or download the FREE \"Exercise & Physical Activity: Your Everyday Guide\" from [employer]. Call 1-170.897.7784 or search [employer] online. · You need 2785-4384 mg of calcium and 5392-8063 IU of vitamin D per day. It is possible to meet your calcium requirement with diet alone, but a vitamin D supplement is usually necessary to meet this goal.  · When exposed to the sun, use a sunscreen that protects against both UVA and UVB radiation with an SPF of 30 or greater. Reapply every 2 to 3 hours or after sweating, drying off with a towel, or swimming. · Always wear a seat belt when traveling in a car. Always wear a helmet when riding a bicycle or motorcycle.   Patient Education        Well Visit, Over 72: Care Instructions  Your Care Instructions     Physical exams can help you stay healthy. Your doctor has checked your overall health and may have suggested ways to take good care of yourself. He or she also may have recommended tests. At home, you can help prevent illness with healthy eating, regular exercise, and other steps. Follow-up care is a key part of your treatment and safety. Be sure to make and go to all appointments, and call your doctor if you are having problems. It's also a good idea to know your test results and keep a list of the medicines you take. How can you care for yourself at home? Reach and stay at a healthy weight. This will lower your risk for many problems, such as obesity, diabetes, heart disease, and high blood pressure. Get at least 30 minutes of exercise on most days of the week. Walking is a good choice. You also may want to do other activities, such as running, swimming, cycling, or playing tennis or team sports. Do not smoke. Smoking can make health problems worse. If you need help quitting, talk to your doctor about stop-smoking programs and medicines. These can increase your chances of quitting for good. Protect your skin from too much sun. When you're outdoors from 10 a.m. to 4 p.m., stay in the shade or cover up with clothing and a hat with a wide brim. Wear sunglasses that block UV rays. Even when it's cloudy, put broad-spectrum sunscreen (SPF 30 or higher) on any exposed skin. See a dentist one or two times a year for checkups and to have your teeth cleaned. Wear a seat belt in the car. Follow your doctor's advice about when to have certain tests. These tests can spot problems early. For men and women  Cholesterol. Your doctor will tell you how often to have this done based on your overall health and other things that can increase your risk for heart attack and stroke. Blood pressure. Have your blood pressure checked during a routine doctor visit.  Your doctor will tell you how often to check your blood pressure based on your age, your blood pressure results, and other factors. Diabetes. Ask your doctor whether you should have tests for diabetes. Vision. Experts recommend that you have yearly exams for glaucoma and other age-related eye problems. Hearing. Tell your doctor if you notice any change in your hearing. You can have tests to find out how well you hear. Colon cancer tests. Keep having colon cancer tests as your doctor recommends. You can have one of several types of tests. Heart attack and stroke risk. At least every 4 to 6 years, you should have your risk for heart attack and stroke assessed. Your doctor uses factors such as your age, blood pressure, cholesterol, and whether you smoke or have diabetes to show what your risk for a heart attack or stroke is over the next 10 years. Osteoporosis. Talk to your doctor about whether you should have a bone density test to find out whether you have thinning bones. Ask your doctor if you need to take a calcium plus vitamin D supplement. You may be able to get enough calcium and vitamin D through your diet. For women  Pap test and pelvic exam. You may no longer need a Pap test. Talk with your doctor about whether to stop or continue to have Pap tests. Breast exam and mammogram. Ask how often you should have a mammogram, which is an X-ray of your breasts. A mammogram can spot breast cancer before it can be felt and when it is easiest to treat. Thyroid disease. Talk to your doctor about whether to have your thyroid checked as part of a regular physical exam. Women have an increased chance of a thyroid problem. For men  Prostate exam. Talk to your doctor about whether you should have a blood test (called a PSA test) for prostate cancer. Experts recommend that you discuss the benefits and risks of the test with your doctor before you decide whether to have this test. Some experts say that men ages 79 and older no longer need testing. Abdominal aortic aneurysm.  Ask your doctor whether you should have a test to check for an aneurysm. You may need a test if you ever smoked or if your parent, brother, sister, or child has had an aneurysm. When should you call for help? Watch closely for changes in your health, and be sure to contact your doctor if you have any problems or symptoms that concern you. Where can you learn more? Go to https://chpepiceweb.Ready. org and sign in to your [employer] account. Enter G015 in the [employer] box to learn more about \"Well Visit, Over 65: Care Instructions. \"     If you do not have an account, please click on the \"Sign Up Now\" link. Current as of: August 22, 2019               Content Version: 12.5  © 1030-2915 Healthwise, Incorporated. Care instructions adapted under license by [facility] ([facility]). If you have questions about a medical condition or this instruction, always ask your healthcare professional. [place] [address] any warranty or liability for your use of this information. Patient Education        Jock Itch: Care Instructions  Your Care Instructions  Jock itch is a fungal infection of the groin. The fungus that causes jock itch lives on your skin. It often affects male athletes, but anyone can get jock itch. You may get an itchy rash on your inner thighs and rear end (buttocks). It spreads and starts to itch when you sweat or are in steamy showers or locker rooms. Jock itch should end soon if you keep your skin dry after you clean it. You can treat jock itch at home with antifungal creams and powders that you can buy without a prescription. Follow-up care is a key part of your treatment and safety. Be sure to make and go to all appointments, and call your doctor if you are having problems. It's also a good idea to know your test results and keep a list of the medicines you take. How can you care for yourself at home? Wash the rash with soap and water.   Wet a soft cloth with cool water alone or mixed with baking soda and sign in to your [employer] account. Enter G303 in the [employer] box to learn more about \"Jock Itch: Care Instructions. \"     If you do not have an account, please click on the \"Sign Up Now\" link. Current as of: October 31, 2019               Content Version: 12.5  © 5684-6357 [employer]. Care instructions adapted under license by [facility] ([facility]). If you have questions about a medical condition or this instruction, always ask your healthcare professional. [address] any warranty or liability for your use of this information. Patient Education        Learning About Living [place]  What is a living will? A living will, also called a declaration, is a legal form. It tells your family and your doctor your wishes when you can't speak for yourself. It's used by the health professionals who will treat you as you near the end of your life or if you get seriously hurt or ill. If you put your wishes in writing, your loved ones and others will know what kind of care you want. They won't need to guess. This can ease your mind and be helpful to others. And you can change or cancel your living will at any time. A living will is not the same as an estate or property will. An estate will explains what you want to happen with your money and property after you die. How do you use it? A living will is used to describe the kinds of treatment or life support you want as you near the end of your life or if you get seriously hurt or ill. Keep these facts in mind about living quinteros. Your living will is used only if you can't speak or make decisions for yourself. Most often, one or more doctors must certify that you can't speak or decide for yourself before your living will takes effect. If you get better and can speak for yourself again, you can accept or refuse any treatment. It doesn't matter what you said in your living will.   Some states may limit your right to refuse things would you still want to be able to do after you receive life-support methods? Would you want to be able to walk? To speak? To eat on your own? To live without the help of machines? Do you want certain Mormonism practices performed if you become very ill? If you have a choice, where do you want to be cared for? In your home? At a hospital or nursing home? If you have a choice at the end of your life, where would you prefer to die? At home? In a hospital or nursing home? Somewhere else? Would you prefer to be buried or cremated? Do you want your organs to be donated after you die? What should you do with your living will? Make sure that your family members and your health care agent have copies of your living will (also called a declaration). Give your doctor a copy of your living will. Ask him or her to keep it as part of your medical record. If you have more than one doctor, make sure that each one has a copy. Put a copy of your living will where it can be easily found. For example, some people may put a copy on their refrigerator door. If you are using a digital copy, be sure your doctor, family members, and health care agent know how to find and access it. Where can you learn more? Go to https://Application CraftpeVisualneteb.admetricks. org and sign in to your [employer] account. Enter B521 in the [employer] box to learn more about \"Learning About Living Colon Hazy. \"     If you do not have an account, please click on the \"Sign Up Now\" link. Current as of: December 9, 2019               Content Version: 12.5  © 5090-9045 Healthwise, Incorporated. Care instructions adapted under license by [facility] ([facility]). If you have questions about a medical condition or this instruction, always ask your healthcare professional. [place] [address] any warranty or liability for your use of this information. Patient Education        Learning About Physical Activity  What is physical activity? Physical activity is any kind of activity that gets your body moving. The types of physical activity that can help you get fit and stay healthy include:  Aerobic or \"cardio\" activities that make your heart beat faster and make you breathe harder, such as brisk walking, riding a bike, or running. Aerobic activities strengthen your heart and lungs and build up your endurance. Strength training activities that make your muscles work against, or \"resist,\" something, such as lifting weights or doing push-ups. These activities help tone and strengthen your muscles. Stretches that allow you to move your joints and muscles through their full range of motion. Stretching helps you be more flexible and avoid injury. What are the benefits of physical activity? Being active is one of the best things you can do for your health. It helps you to:  Feel stronger and have more energy to do all the things you like to do. Focus better at school or work. Feel, think, and sleep better. Reach and stay at a healthy weight. Lose fat and build lean muscle. Lower your risk for serious health problems. Keep your bones, muscles, and joints strong. How can you make physical activity part of your life? Get at least 30 minutes of exercise on most days of the week. Walking is a good choice. You also may want to do other activities, such as running, swimming, cycling, or playing tennis or team sports. Pick activities that you like--ones that make your heart beat faster, your muscles stronger, and your muscles and joints more flexible. If you find more than one thing you like doing, do them all. You don't have to do the same thing every day. Get your heart pumping every day. Any activity that makes your heart beat faster and keeps it at that rate for a while counts. Here are some great ways to get your heart beating faster:  Go for a brisk walk, run, or bike ride. Go for a hike or swim. Go in-line skating.   Play a game of touch football, basketball, or soccer. Ride a bike. Play tennis or racquetball. Climb stairs. Even some household chores can be aerobic--just do them at a faster pace. Vacuuming, raking or mowing the lawn, sweeping the garage, and washing and waxing the car all can help get your heart rate up. Strengthen your muscles during the week. You don't have to lift heavy weights or grow big, bulky muscles to get stronger. Doing a few simple activities that make your muscles work against, or \"resist,\" something can help you get stronger. For example, you can:  Do push-ups or sit-ups, which use your own body weight as resistance. Lift weights or dumbbells or use stretch bands at home or in a gym or community center. Stretch your muscles often. Stretching will help you as you become more active. It can help you stay flexible, loosen tight muscles, and avoid injury. It can also help improve your balance and posture and can be a great way to relax. Be sure to stretch the muscles you'll be using when you work out. It's best to warm your muscles slightly before you stretch them. Walk or do some other light aerobic activity for a few minutes, and then start stretching. When you stretch your muscles:  Do it slowly. Stretching is not about going fast or making sudden movements. Don't push or bounce during a stretch. Hold each stretch for at least 15 to 30 seconds, if you can. You should feel a stretch in the muscle, but not pain. Breathe out as you do the stretch. Then breathe in as you hold the stretch. Don't hold your breath. If you're worried about how more activity might affect your health, have a checkup before you start. Follow any special advice your doctor gives you for getting a smart start. Where can you learn more? Go to https://joselin.Cytoo. org and sign in to your [employer] account. Enter M677 in the [employer] box to learn more about \"Learning About Physical Activity. \"     If you do not have an account, please click on the \"Sign Up Now\" link. Current as of: January 16, 2020               Content Version: 12.5  © 2006-2020 [employer]. Care instructions adapted under license by [facility] [address] If you have questions about a medical condition or this instruction, always ask your healthcare professional. [address] any warranty or liability for your use of this information. Patient Education        Learning About How to Make a Home Safe  Making your home safe: Overview  You can help protect the person in your care by making the home safe. Here are some general tips for how to lower the chance of getting injured in the home. Pad sharp corners on furniture and counter tops. Keep objects that are used often within easy reach. Install handrails around the toilet and in the shower. Use a tub mat to prevent slipping. Use a shower chair or bath bench when the person bathes. Provide good lighting inside and outside the home. Put night-lights in bedrooms, hallways, and bathrooms. Have light at the top and bottom of stairways. Have a first aid kit. It is also important to be aware of safe temperatures in the home. When helping someone bathe, use the back of your hand to test the water to make sure it's not too hot. Lower the temperature setting in the hot water heater to 120°F or lower to avoid burns. And make sure other liquids (such as coffee, tea, or soup) are not too hot. How can you protect from fire and carbon monoxide? Home safety alarms are very important. A smoke alarm can detect small amounts of smoke. This can allow time to escape from a fire. And a carbon monoxide alarm can let people know about this deadly gas before it starts to make them sick. Put smoke alarms: On each level of the home, in the hallway outside sleeping areas, and inside each bedroom. In the center of a ceiling, or on a wall 6 to 12 inches from the ceiling.  This is where smoke goes first. Avoid places near doors, windows, or air ducts. Put a carbon monoxide alarm in the hallway outside of the bedrooms in each sleeping area of the house. The alarm should be placed high on the wall. Make sure that the alarm can't be covered up by furniture or drapes. Test alarms regularly by pressing the test button. Replace non-lithium batteries in alarms twice a year. Have a plan for getting out of the home if there is a fire. Practice by having a fire drill. Keep a fire extinguisher in the kitchen. What can you do to prevent falls? You can help prevent falls by keeping rooms uncluttered, with clear walkways around furniture. Keep electrical cords off the floor, and remove throw rugs to prevent tripping. If there are steps in the home, make sure they all have handrails, and always use the handrails. Don't leave items on the steps, and be sure to fix any that are loose, broken, or uneven. How can you increase safety for people with dementia? If you are caring for someone who has dementia, you may need to make some extra changes to create a safe home. People with dementia have a loss of mental skills, such as memory, problem solving, and learning. So things that might not have been a danger to them before can cause safety problems now. Here are some things to consider:  Don't move furniture around. The person may become confused. Use locks on doors and cupboards. Lock up knives, scissors, medicines, cleaning supplies, and other dangerous items. Use hidden switches or controls for the stove, thermostat, water heater, and other appliances. If your loved one is still cooking, think about whether that is safe. It may be okay with some help, depending on your loved one's condition. But for people who have memory or thinking problems, it's best to avoid any activities that might not be safe.   If the person tends to wander or to try to leave the home, install motion-sensor lights on all doors

## 2020-09-02 NOTE — PROGRESS NOTES
Subjective:      Patient ID: Stacey Leon is a 77 y.o. female. HPI  Pt here for a follow up from ED for tinea cruris. Was on nystatin powder, not cleaning the rash. Placed on diflucan tabs 2 tabs weekly, then nystatin cream.  Reviewed BMI of 37. Encouraged diet, exercise and weight loss. Reviewed health maintenance, up to date, upcoming colonoscopy. , non smoker, pmh reviewed. Review of Systems   Constitutional: Negative for chills, fatigue, fever and unexpected weight change. HENT: Negative for congestion, ear pain, rhinorrhea and sore throat. Eyes: Negative for pain and visual disturbance. Respiratory: Negative for cough, chest tightness, shortness of breath and wheezing. Cardiovascular: Negative for chest pain and palpitations. Gastrointestinal: Negative for abdominal pain, blood in stool, constipation, diarrhea, nausea and vomiting. Genitourinary: Negative for difficulty urinating, frequency, hematuria and urgency. Musculoskeletal: Negative for back pain, joint swelling, myalgias and neck pain. Skin: Positive for rash. Neurological: Negative for dizziness and headaches. Hematological: Negative for adenopathy. Does not bruise/bleed easily. Psychiatric/Behavioral: Negative for behavioral problems and sleep disturbance. The patient is not nervous/anxious. Objective:   Physical Exam  Vitals signs and nursing note reviewed. Constitutional:       Appearance: She is well-developed. HENT:      Head: Normocephalic and atraumatic. Right Ear: External ear normal.      Left Ear: External ear normal.      Nose: Nose normal.      Mouth/Throat:      Mouth: Mucous membranes are moist.   Eyes:      Pupils: Pupils are equal, round, and reactive to light. Neck:      Musculoskeletal: Neck supple. Thyroid: No thyromegaly. Cardiovascular:      Rate and Rhythm: Normal rate and regular rhythm. Heart sounds: Normal heart sounds.    Pulmonary:      Breath sounds: other patient data from the Health Risk Assessment form, are documented in Flowsheets linked to this Encounter. Allergies   Allergen Reactions    Latex Hives     Patient noted in preop that she might be allergic - maybe breaking out when exposed.  Demerol Hcl [Meperidine] Nausea And Vomiting     confusion    Propoxyphene     Tape Nigel Cork Tape]        Prior to Visit Medications    Medication Sig Taking?  Authorizing Provider   sulfacetamide (BLEPH-10) 10 % ophthalmic solution Place 2 drops into the left eye 4 times daily for 10 days Yes Iker Duenas MD   fluconazole (DIFLUCAN) 100 MG tablet Take 2 tablets by mouth once a week for 4 doses Yes Iker Duenas MD   nystatin (MYCOSTATIN) 307905 UNIT/GM powder APPLY TO AFFECTED AREA 3 TIMES A DAY Yes Elan Willard MD   bumetanide (BUMEX) 0.5 MG tablet Take 1 tablet by mouth daily Yes Elan Willard MD   omeprazole (PRILOSEC) 40 MG delayed release capsule Take 1 capsule by mouth daily Yes Elan Willard MD   doxepin (SINEQUAN) 10 MG capsule Take 1 capsule by mouth nightly Yes Elan Willard MD   simvastatin (ZOCOR) 40 MG tablet Take 1 tablet by mouth nightly Yes Elan Willard MD   citalopram (CELEXA) 20 MG tablet Take 1 tablet by mouth 2 times daily Yes Elan Willard MD   propranolol (INDERAL LA) 120 MG extended release capsule Take 1 capsule by mouth daily Yes Elan Willard MD   budesonide-formoterol (SYMBICORT) 160-4.5 MCG/ACT AERO Inhale 2 puffs into the lungs 2 times daily Yes Elan Willard MD   ipratropium (ATROVENT) 0.02 % nebulizer solution Take 2.5 mLs by nebulization 4 times daily Yes Elan Willard MD   VENTOLIN  (90 Base) MCG/ACT inhaler INHALE 2 PUFFS BY MOUTH 4 TIMES A DAY AS NEEDED Yes Elan Willard MD   famotidine (PEPCID) 20 MG tablet Take 1 tablet by mouth 2 times daily Yes Elan Willard MD   nystatin (MYCOSTATIN) 874888 UNIT/ML suspension Take 1 tsp by mouth swish and swallow No  Body mass index is 36.74 kg/m².   Health Habits/Nutrition Interventions:  · Inadequate physical activity:  educational materials provided to promote increased physical activity  · Dental exam overdue:  patient encouraged to make appointment with his/her dentist    Hearing/Vision:  No exam data present  Hearing/Vision  Do you or your family notice any trouble with your hearing?: No  Do you have difficulty driving, watching TV, or doing any of your daily activities because of your eyesight?: No  Have you had an eye exam within the past year?: (!) No  Hearing/Vision Interventions:  · Vision concerns:  patient encouraged to make appointment with his/her eye specialist    Safety:  Safety  Do you have working smoke detectors?: Yes  Have all throw rugs been removed or fastened?: Yes  Do you have non-slip mats or surfaces in all bathtubs/showers?: Yes  Do all of your stairways have a railing or banister?: (!) No  Are your doorways, halls and stairs free of clutter?: Yes  Do you always fasten your seatbelt when you are in a car?: Yes  Safety Interventions:  · Home safety tips provided    Personalized Preventive Plan   Current Health Maintenance Status  Immunization History   Administered Date(s) Administered    Influenza Virus Vaccine 12/04/2014, 10/02/2015, 10/05/2016    Influenza, High Dose (Fluzone 65 yrs and older) 10/02/2017, 10/10/2018    Influenza, Triv, inactivated, subunit, adjuvanted, IM (Fluad 65 yrs and older) 10/11/2019    Pneumococcal Conjugate 13-valent (Nunez Rota) 01/28/2015    Pneumococcal Polysaccharide ([id number]) 07/24/2018        Health Maintenance   Topic Date Due    DTaP/Tdap/Td vaccine (1 - Tdap) 01/31/1965    Shingles Vaccine (1 of 2) 01/31/1996    Annual Wellness Visit (AWV)  05/29/2019    Diabetic retinal exam  10/20/2019    Flu vaccine (1) 09/01/2020    A1C test (Diabetic or Prediabetic)  09/10/2020    Colon cancer screen colonoscopy  10/15/2020    Diabetic foot exam  06/10/2021

## 2020-10-05 ENCOUNTER — HOSPITAL ENCOUNTER (OUTPATIENT)
Dept: ULTRASOUND IMAGING | Age: 75
Discharge: HOME | End: 2020-10-05
Payer: MEDICARE

## 2020-10-05 PROCEDURE — 76770 US EXAM ABDO BACK WALL COMP: CPT

## 2020-10-12 ENCOUNTER — HOSPITAL ENCOUNTER (OUTPATIENT)
Age: 75
Discharge: HOME | End: 2020-10-12
Payer: MEDICARE

## 2020-10-12 ENCOUNTER — HOSPITAL ENCOUNTER (OUTPATIENT)
Dept: GENERAL RADIOLOGY | Age: 75
Discharge: HOME | End: 2020-10-12
Payer: MEDICARE

## 2020-10-12 LAB
ALBUMIN SERPL-MCNC: 4 G/DL (ref 3.5–5.1)
ALP BLD-CCNC: 134 U/L (ref 38–126)
ALT SERPL-CCNC: 10 U/L (ref 11–66)
ANION GAP SERPL CALCULATED.3IONS-SCNC: 11 MEQ/L (ref 8–16)
AST SERPL-CCNC: 14 U/L (ref 5–40)
BASOPHILS # BLD: 0.7 %
BASOPHILS ABSOLUTE: 0.1 THOU/MM3 (ref 0–0.1)
BILIRUB SERPL-MCNC: 0.4 MG/DL (ref 0.3–1.2)
BUN BLDV-MCNC: 10 MG/DL (ref 7–22)
CALCIUM SERPL-MCNC: 8.9 MG/DL (ref 8.5–10.5)
CHLORIDE BLD-SCNC: 102 MEQ/L (ref 98–111)
CO2: 29 MEQ/L (ref 23–33)
CREAT SERPL-MCNC: 0.7 MG/DL (ref 0.4–1.2)
EOSINOPHIL # BLD: 2.3 %
EOSINOPHILS ABSOLUTE: 0.2 THOU/MM3 (ref 0–0.4)
ERYTHROCYTE [DISTWIDTH] IN BLOOD BY AUTOMATED COUNT: 15.6 % (ref 11.5–14.5)
ERYTHROCYTE [DISTWIDTH] IN BLOOD BY AUTOMATED COUNT: 51.2 FL (ref 35–45)
GFR SERPL CREATININE-BSD FRML MDRD: 82 ML/MIN/1.73M2
GLUCOSE BLD-MCNC: 142 MG/DL (ref 70–108)
HCT VFR BLD CALC: 41.1 % (ref 37–47)
HEMOGLOBIN: 12.9 GM/DL (ref 12–16)
IMMATURE GRANS (ABS): 0.04 THOU/MM3 (ref 0–0.07)
IMMATURE GRANULOCYTES: 0.4 %
LD: 156 U/L (ref 100–190)
LYMPHOCYTES # BLD: 28.4 %
LYMPHOCYTES ABSOLUTE: 2.6 THOU/MM3 (ref 1–4.8)
MCH RBC QN AUTO: 28.7 PG (ref 26–33)
MCHC RBC AUTO-ENTMCNC: 31.4 GM/DL (ref 32.2–35.5)
MCV RBC AUTO: 91.3 FL (ref 81–99)
MONOCYTES # BLD: 6.6 %
MONOCYTES ABSOLUTE: 0.6 THOU/MM3 (ref 0.4–1.3)
NUCLEATED RED BLOOD CELLS: 0 /100 WBC
PLATELET # BLD: 278 THOU/MM3 (ref 130–400)
PMV BLD AUTO: 11 FL (ref 9.4–12.4)
POTASSIUM SERPL-SCNC: 3.6 MEQ/L (ref 3.5–5.2)
RBC # BLD: 4.5 MILL/MM3 (ref 4.2–5.4)
SEG NEUTROPHILS: 61.6 %
SEGMENTED NEUTROPHILS ABSOLUTE COUNT: 5.7 THOU/MM3 (ref 1.8–7.7)
SODIUM BLD-SCNC: 142 MEQ/L (ref 135–145)
TOTAL PROTEIN: 6.9 G/DL (ref 6.1–8)
WBC # BLD: 9.2 THOU/MM3 (ref 4.8–10.8)

## 2020-10-12 PROCEDURE — 71046 X-RAY EXAM CHEST 2 VIEWS: CPT

## 2020-10-12 PROCEDURE — 80053 COMPREHEN METABOLIC PANEL: CPT

## 2020-10-12 PROCEDURE — 36415 COLL VENOUS BLD VENIPUNCTURE: CPT

## 2020-10-12 PROCEDURE — 83615 LACTATE (LD) (LDH) ENZYME: CPT

## 2020-10-12 PROCEDURE — 85025 COMPLETE CBC W/AUTO DIFF WBC: CPT

## 2020-10-13 ENCOUNTER — HOSPITAL ENCOUNTER (OUTPATIENT)
Age: 75
Discharge: HOME | End: 2020-10-13
Payer: MEDICARE

## 2020-10-13 PROCEDURE — 88112 CYTOPATH CELL ENHANCE TECH: CPT

## 2020-10-15 ENCOUNTER — OFFICE VISIT (OUTPATIENT)
Dept: UROLOGY | Age: 75
End: 2020-10-15
Payer: MEDICARE

## 2020-10-15 VITALS — BODY MASS INDEX: 42.48 KG/M2 | WEIGHT: 225 LBS | TEMPERATURE: 97.2 F | HEIGHT: 61 IN

## 2020-10-15 LAB
AMORPHOUS: ABNORMAL
BACTERIA: ABNORMAL
BILIRUBIN URINE: NEGATIVE
BILIRUBIN URINE: NEGATIVE
BLOOD URINE, POC: ABNORMAL
BLOOD, URINE: ABNORMAL
CASTS: ABNORMAL /LPF
CHARACTER, URINE: ABNORMAL
CHARACTER, URINE: CLEAR
COLOR, URINE: YELLOW
COLOR: YELLOW
CRYSTALS: ABNORMAL
EPITHELIAL CELLS, UA: ABNORMAL /HPF
GLUCOSE URINE: NEGATIVE MG/DL
GLUCOSE, URINE: NEGATIVE MG/DL
KETONES, URINE: NEGATIVE
KETONES, URINE: NEGATIVE
LEUKOCYTE CLUMPS, URINE: ABNORMAL
LEUKOCYTE ESTERASE, URINE: ABNORMAL
MUCUS: ABNORMAL
NITRITE, URINE: NEGATIVE
NITRITE, URINE: NEGATIVE
PH UA: 6 (ref 5–9)
PH, URINE: 6 (ref 5–9)
PROTEIN UA: NEGATIVE MG/DL
PROTEIN, URINE: NEGATIVE MG/DL
RBC URINE: ABNORMAL /HPF
RENAL EPITHELIAL, UA: ABNORMAL
SPECIFIC GRAVITY UA: 1.02 (ref 1–1.03)
SPECIFIC GRAVITY, URINE: 1.02 (ref 1–1.03)
UROBILINOGEN, URINE: 0.2 EU/DL (ref 0–1)
UROBILINOGEN, URINE: 0.2 EU/DL (ref 0–1)
WBC UA: ABNORMAL /HPF
YEAST: ABNORMAL

## 2020-10-15 PROCEDURE — 81003 URINALYSIS AUTO W/O SCOPE: CPT | Performed by: PHYSICIAN ASSISTANT

## 2020-10-15 PROCEDURE — 99213 OFFICE O/P EST LOW 20 MIN: CPT | Performed by: PHYSICIAN ASSISTANT

## 2020-10-15 NOTE — PROGRESS NOTES
Ms. 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 Her serial imaging and labs have been negative for metastatic disease. She did undergo an office cystoscopy with Dr. Anurag Wade in January 2019 for an elevated Bladder Cx of 0.24. Her cystoscopy was unremarkable. Repeat testing in May 2019 was within normal limits at 0.11. Her cytoscopy was found to be normal.  She denies HA, chest pain, fever/chills, night sweats, irregular bone pain, N/V, leg pain, hemoptysis, and lightheadedness.  In regards to her urinary health, she denies dysuria, hematuria, weakened stream, urinary retention, or flank pain.  She reports mild frequency and urgency but is not taking any medication for her irritative symptoms. She is having regular bowel movements and her appetite is normal. She presents for further evaluation.           Past Medical History:   Diagnosis Date    Anxiety     Arthritis     Asthma     [facility]     Depression     Diabetes mellitus ([facility] [address] [facility]     Headache(784.0)     Hyperlipidemia     Hypertension     Nausea & vomiting     Persistent proteinuria associated with type 2 diabetes mellitus ([address]     Renal cell carcinoma of left kidney ([facility] [address] 12/6/2014    Unspecified cerebral artery occlusion with cerebral infarction        Past Surgical History:   Procedure Laterality Date    BREAST BIOPSY      CARDIAC CATHETERIZATION      CHOLECYSTECTOMY      COLONOSCOPY      ENDOSCOPY, COLON, DIAGNOSTIC      FINGER SURGERY  tumor    HERNIA REPAIR      HIATAL HERNIA REPAIR  7-    stomach repair    PARTIAL NEPHRECTOMY Left 11/21/2014    robotic left partial nephrectomy    TONSILLECTOMY      TUBAL LIGATION         Current Outpatient Medications on File Prior to Visit   Medication Sig Dispense Refill    nystatin (MYCOSTATIN) 051877 UNIT/GM cream Apply topically 2 times daily. 30 g 2    bumetanide (BUMEX) 0.5 MG tablet Take 1 tablet by mouth daily 30 tablet 11    omeprazole (PRILOSEC) 40 MG delayed release capsule Take 1 capsule by mouth daily 30 capsule 11    doxepin (SINEQUAN) 10 MG capsule Take 1 capsule by mouth nightly 30 capsule 11    simvastatin (ZOCOR) 40 MG tablet Take 1 tablet by mouth nightly 30 tablet 11    citalopram (CELEXA) 20 MG tablet Take 1 tablet by mouth 2 times daily 60 tablet 11    propranolol (INDERAL LA) 120 MG extended release capsule Take 1 capsule by mouth daily 30 capsule 11    budesonide-formoterol (SYMBICORT) 160-4.5 MCG/ACT AERO Inhale 2 puffs into the lungs 2 times daily 1 Inhaler 11    ipratropium (ATROVENT) 0.02 % nebulizer solution Take 2.5 mLs by nebulization 4 times daily 300 mL 3    VENTOLIN  (90 Base) MCG/ACT inhaler INHALE 2 PUFFS BY MOUTH 4 TIMES A DAY AS NEEDED 1 Inhaler 11    famotidine (PEPCID) 20 MG tablet Take 1 tablet by mouth 2 times daily 60 tablet 11    nystatin (MYCOSTATIN) 205468 UNIT/ML suspension Take 1 tsp by mouth swish and swallow four times daily for 10 days. 200 mL 0    nitroGLYCERIN (NITROSTAT) 0.4 MG SL tablet Place 1 tablet under the tongue every 5 minutes as needed for Chest pain 25 tablet 1    Probiotic Product ([employer] PROBIOTIC) CAPS Take by mouth      oxybutynin (DITROPAN-XL) 5 MG extended release tablet Take 5 mg by mouth daily      diclofenac sodium 1 % GEL Apply 2 g topically 4 times daily 2 Tube 1    acetaminophen (TYLENOL) 500 MG tablet Take 500 mg by mouth every 6 hours as needed for Pain      Cholecalciferol (VITAMIN D3) 5000 units TABS Take by mouth daily       aspirin 81 MG tablet Take 81 mg by mouth daily      fexofenadine (ALLEGRA) 180 MG tablet Take 180 mg by mouth daily.  Multiple Vitamin (MULTI-VITAMINS PO) Take  by mouth daily.        Current Facility-Administered Medications on File Prior to Visit   Medication Dose Route Frequency Provider Last Rate Last Dose    cefOXitin (MEFOXIN) 2 g in dextrose 5% 50 mL (mini-bag)  2 g Intravenous Once Eusebio Moran MD           Allergies   Allergen Reactions    Latex Hives     Patient noted in preop that she might be allergic - maybe breaking out when exposed.     Demerol Hcl [Meperidine] Nausea And Vomiting     confusion    Propoxyphene     Tape Astoria Mahesh Tape]        Family History   Problem Relation Age of Onset    Cancer Mother     Heart Disease Mother     High Blood Pressure Mother     High Cholesterol Mother     Stroke Mother     Cancer Father     Heart Disease Father     High Blood Pressure Father     High Cholesterol Father     Cancer Sister        Social History     Socioeconomic History    Marital status:      Spouse name: Not on file    Number of children: Not on file    Years of education: Not on file    Highest education level: Not on file   Occupational History    Not on file   Social Needs    Financial resource strain: Not on file    Food insecurity     Worry: Not on file     Inability: Not on file    Transportation needs     Medical: Not on file     Non-medical: Not on file   Tobacco Use    Smoking status: Never Smoker    Smokeless tobacco: Never Used   Substance and Sexual Activity    Alcohol use: No    Drug use: No    Sexual activity: Not on file   Lifestyle    Physical activity     Days per week: Not on file     Minutes per session: Not on file    Stress: Not on file   Relationships    Social connections     Talks on phone: Not on file     Gets together: Not on file     Attends Orthodoxy service: Not on file     Active member of club or organization: Not on file     Attends meetings of clubs or organizations: Not on file     Relationship status: Not on file    Intimate partner violence     Fear of current or ex partner: Not on file     Emotionally abused: Not on file     Physically abused: Not on file     Forced sexual activity: Not on file   Other Topics Concern    Not on file 5.0 - 9.0    Protein, Urine Negative NEGATIVE mg/dl    Urobilinogen, Urine 0.20 0.0 - 1.0 eu/dl    Nitrite, Urine Negative NEGATIVE    Leukocyte Clumps, Urine Small (A) NEGATIVE    Color, Urine Yellow YELLOW-STRAW    Character, Urine Clear [id number].[id number]       Lab Results   Component Value Date    CREATININE 0.7 10/12/2020    BUN 10 10/12/2020     10/12/2020    K 3.6 10/12/2020     10/12/2020    CO2 29 10/12/2020     Radiology: Renal US (10/12/20)  FINDINGS:    Kidneys are normal in appearance without definite focal lesion identified. Scarring of the left kidney at the superior pole is better visualized on prior CT. No hydronephrosis is present. The bladder is nondistended. RIGHT KIDNEY - 10.4 x 5.4 x 5.1 cm    Resistive Index - 0.61    Cortical Thickness - 0.93 cm         LEFT KIDNEY - 9.4 x 4.0 x 4.2 cm    Resistive Index - 0.67    Cortical Thickness - 1.0 cm         URINARY BLADDER    Pre-Void - PATIENT VOIDED PRIOR TO EXAM    Post-Void - 11.43 mL              Impression     No focal renal lesion identified.                Plan:  1. Left renal cell carcinoma:  Renal US unremarkable for metastatic disease.  CXR normal. Obtain Bladder Cx today. CT scan abdomen and pelvis in one year. Will repeat alk phosphatase level in six weeks. Call if any questions or concerns in.       2. Mild diverticulosis- Patient follows with Dr. Juliet Wagner. Asymptomatic.     3. Microscopic hematuria/Abnormal Bladder Cx (12/18)- Bladder Cx today. .      4. Left superficial cervical/supraclavicular lymphadenopathy- Resolved.

## 2020-10-16 ENCOUNTER — TELEPHONE (OUTPATIENT)
Dept: UROLOGY | Age: 75
End: 2020-10-16

## 2020-10-16 LAB
ORGANISM: ABNORMAL
URINE CULTURE, ROUTINE: ABNORMAL

## 2020-10-16 RX ORDER — FLUCONAZOLE 100 MG/1
TABLET ORAL
Qty: 15 TABLET | Refills: 0 | Status: SHIPPED | OUTPATIENT
Start: 2020-10-16 | End: 2021-01-08 | Stop reason: SDUPTHER

## 2020-10-16 NOTE — TELEPHONE ENCOUNTER
Salima Dolan needs refill of   Requested Prescriptions     Pending Prescriptions Disp Refills    fluconazole (DIFLUCAN) 100 MG tablet [Pharmacy Med Name: FLUCONAZOLE 100 MG TABLET] 15 tablet 0     Sig: TAKE 1 TABLET BY MOUTH EVERY DAY       Last Filled on:  9/2/20    Last Visit Date:  9/2/2020    Next Visit Date:    Visit date not found      Called patient, stated that her rash is getting better but still thinks she needs more.

## 2020-10-23 ENCOUNTER — TELEPHONE (OUTPATIENT)
Dept: UROLOGY | Age: 75
End: 2020-10-23

## 2020-10-23 NOTE — TELEPHONE ENCOUNTER
I called and notified the patient that her Bladder Cx is within normal limits at 0.09. No further testing needs to be obtained at this time. Patient voiced understanding.

## 2020-10-23 NOTE — TELEPHONE ENCOUNTER
Patient's Bladder Cx is within normal limits at 0.09. No further testing needs to be obtained at this time.

## 2020-10-23 NOTE — ED PROVIDER NOTES
Nutrition Care Plan    Nutrition Diagnosis:   Inadequate intake related to decreased responsiveness and history of Alzheimer's dementia as evidenced by weight loss of 9.7 kg (15.7%) in the last 5 months.    Intervention:  Commercial beverage: Will order Ensure Enlive 8 oz once daily to provide 350 kcal, 20 grams protein.  Commerical food: Will order Magic Cup once daily to provide 290 kcal, 9 grams protein.     Monitoring and Evaluation:  Amount of food: Goal: Will consume >75% of meals.    Type of food/meals: Goal: Will consume >50% of supplements.        TONSILLECTOMY      TUBAL LIGATION         CURRENT MEDICATIONS       Previous Medications    ACETAMINOPHEN (TYLENOL) 500 MG TABLET    Take 500 mg by mouth every 6 hours as needed for Pain    ALBUTEROL SULFATE HFA (PROAIR HFA) 108 (90 BASE) MCG/ACT INHALER    INHALE 2 PUFFS BY MOUTH 4 TIMES A DAY AS NEEDED    ASPIRIN 81 MG TABLET    Take 81 mg by mouth daily    BUDESONIDE-FORMOTEROL (SYMBICORT) 160-4.5 MCG/ACT AERO    Inhale 2 puffs into the lungs 2 times daily    BUMETANIDE (BUMEX) 0.5 MG TABLET    Take 1 tablet by mouth daily    CALCIUM CARBONATE (OSCAL) 500 MG TABS TABLET    Take 500 mg by mouth daily    CHOLECALCIFEROL (VITAMIN D3) 5000 UNITS TABS    Take by mouth daily     CITALOPRAM (CELEXA) 20 MG TABLET    Take 1 tablet by mouth 2 times daily    DOXEPIN (SINEQUAN) 10 MG CAPSULE    Take 1 capsule by mouth nightly    FEXOFENADINE (ALLEGRA) 180 MG TABLET    Take 180 mg by mouth daily. HYDROCHLOROTHIAZIDE (HYDRODIURIL) 25 MG TABLET    Take 1 tablet by mouth daily    LORAZEPAM (ATIVAN) 0.5 MG TABLET    Take 1 tablet by mouth every 12 hours as needed for Anxiety for up to 30 days. F41.9    MULTIPLE VITAMIN (MULTI-VITAMINS PO)    Take  by mouth daily. NITROGLYCERIN (NITROSTAT) 0.4 MG SL TABLET    Place 1 tablet under the tongue every 5 minutes as needed for Chest pain    OMEPRAZOLE (PRILOSEC) 40 MG DELAYED RELEASE CAPSULE    Take 1 capsule by mouth daily    PROPRANOLOL (INDERAL LA) 120 MG EXTENDED RELEASE CAPSULE    Take 1 capsule by mouth daily    RANITIDINE (ZANTAC) 150 MG CAPSULE    Take 1 capsule by mouth 2 times daily    SIMVASTATIN (ZOCOR) 40 MG TABLET    Take 1 tablet by mouth nightly       ALLERGIES     Latex; Demerol hcl [meperidine];  Propoxyphene; and Tape Robles Driver tape]    FAMILY HISTORY       Family History   Problem Relation Age of Onset    Cancer Mother     Heart Disease Mother     High Blood Pressure Mother     High Cholesterol Mother     Stroke Mother     Cancer Father     Heart Disease rebound and no guarding. Musculoskeletal: She exhibits edema (2+ pitting, BLE). She exhibits no tenderness. Negative Patrick's Sign bilaterally   Lymphadenopathy:     She has no cervical adenopathy. Neurological: She is alert and oriented to person, place, and time. No cranial nerve deficit. She exhibits normal muscle tone. Coordination normal.   Skin: Skin is warm and dry. No rash noted. She is not diaphoretic. No erythema. No pallor. Psychiatric: She has a normal mood and affect. Her behavior is normal. Thought content normal.   Nursing note and vitals reviewed. DIAGNOSTIC RESULTS     EKG: (none if blank)  All EKG's are interpreted by the Emergency Department Physician who either signs or Co-signs this chart in the absence of a cardiologist.    EKG shows a normal sinus rhythm normal ND, QRS, and QTc intervals. No ectopy and no acute ischemic changes. RADIOLOGY: (none if blank)  Interpretation per the Radiologist below, if available at the time of this note:    XR CHEST STANDARD (2 VW)   Final Result   1. There is mild linear platelike atelectasis at the right lung base. **This report has been created using voice recognition software. It may contain minor errors which are inherent in voice recognition technology. **      Final report electronically signed by Dr. Tanisha Stockton on 4/12/2019 2:40 PM      CTA CHEST W WO CONTRAST    (Results Pending)       LABS:  Labs Reviewed   CBC WITH AUTO DIFFERENTIAL - Abnormal; Notable for the following components:       Result Value    RBC 4.08 (*)     Hemoglobin 11.7 (*)     Hematocrit 36.1 (*)     RDW-CV 15.1 (*)     RDW-SD 48.3 (*)     All other components within normal limits   BRAIN NATRIURETIC PEPTIDE - Abnormal; Notable for the following components:    Pro-BNP 1374.0 (*)     All other components within normal limits   HEPATIC FUNCTION PANEL - Abnormal; Notable for the following components:    ALT 7 (*)     All other components within normal limits HIGH COMPRESSION SM) MISC    1 each by Does not apply route daily as needed (swelling)    PREDNISONE (DELTASONE) 50 MG TABLET    Take 1 tablet by mouth daily              (Pleasenote that portions of this note were completed with a voice recognition program.  Efforts were made to edit the dictations but occasionally words are mis-transcribed.)    Scribe:  Loli Signs 4/12/19 2:13 PM Scribing for and in the presence of Lisa Rodriguez DO. Signed by:Estephanie John, 04/12/19 4:47 PM    Provider:  I personally performed the services described in the documentation, reviewedand edited thedocumentation which was dictated to the scribe in my presence, and it accurately records my words and actions.     Lisa Rdoriguez DO 4/12/19 4:47 PM         Lisa Rodriguez DO, FACEP (electronically signed)  Attending Emergency Physician        Lisa Rodriguez DO  04/12/19 [id number]

## 2020-11-05 ENCOUNTER — TELEPHONE (OUTPATIENT)
Dept: UROLOGY | Age: 75
End: 2020-11-05

## 2020-12-21 ENCOUNTER — TELEPHONE (OUTPATIENT)
Dept: FAMILY MEDICINE CLINIC | Age: 75
End: 2020-12-21

## 2020-12-21 NOTE — LETTER
[address] [facility] [address]  Dept: [phone]  Loc: [facility] [place]        12/21/2020    Malaika Bello  [address] [facility] [address]      Dear Megan Bui: This letter is a reminder that your tests are due. Please take this lab slip that is attached to any lab of your choosing. As of June 1, 2020 we are requiring appointments for lab work in the [facility] office. Please call us at [phone] to schedule an appointment. We are no longer accepting walk-in visits. If you've already underwent or scheduled these procedures, please disregard this notice.       Sincerely,        Lorenzo Thomas MD

## 2021-01-04 ENCOUNTER — NURSE ONLY (OUTPATIENT)
Dept: LAB | Age: 76
End: 2021-01-04

## 2021-01-08 DIAGNOSIS — B35.6 TINEA CRURIS: ICD-10-CM

## 2021-01-08 RX ORDER — FLUCONAZOLE 100 MG/1
TABLET ORAL
Qty: 15 TABLET | Refills: 0 | Status: SHIPPED | OUTPATIENT
Start: 2021-01-08 | End: 2021-04-23 | Stop reason: SDUPTHER

## 2021-01-08 RX ORDER — NYSTATIN 100000 U/G
CREAM TOPICAL
Qty: 30 G | Refills: 2 | Status: SHIPPED | OUTPATIENT
Start: 2021-01-08 | End: 2021-04-23 | Stop reason: ALTCHOICE

## 2021-01-08 NOTE — TELEPHONE ENCOUNTER
These are not continuing meds. These are for issues. Is she requesting these refills?   What's going on?

## 2021-01-08 NOTE — TELEPHONE ENCOUNTER
Spoke with pt and states she has \"jock itch\" and she likes to use both the nystatin and fluconazole to get rid of it. \"It's driving my crazy. \"  USes Padinmotion.

## 2021-01-08 NOTE — TELEPHONE ENCOUNTER
Feng Solis needs refill of   Requested Prescriptions     Pending Prescriptions Disp Refills    nystatin (MYCOSTATIN) 002114 UNIT/GM cream 30 g 2     Sig: Apply topically 2 times daily.     fluconazole (DIFLUCAN) 100 MG tablet 15 tablet 0     Sig: TAKE 1 TABLET BY MOUTH EVERY DAY       Last Filled on:  9/2/20    Last Visit Date:  9/2/2020    Next Visit Date:    Visit date not found

## 2021-01-22 ENCOUNTER — TELEPHONE (OUTPATIENT)
Dept: FAMILY MEDICINE CLINIC | Age: 76
End: 2021-01-22

## 2021-01-22 LAB
AVERAGE GLUCOSE: 120
HBA1C MFR BLD: 5.8 %

## 2021-03-11 ENCOUNTER — OFFICE VISIT (OUTPATIENT)
Dept: CARDIOLOGY CLINIC | Age: 76
End: 2021-03-11
Payer: MEDICARE

## 2021-03-11 VITALS
BODY MASS INDEX: 38.99 KG/M2 | SYSTOLIC BLOOD PRESSURE: 152 MMHG | HEIGHT: 65 IN | DIASTOLIC BLOOD PRESSURE: 82 MMHG | HEART RATE: 84 BPM | WEIGHT: 234 LBS

## 2021-03-11 DIAGNOSIS — I25.10 CORONARY ARTERY DISEASE INVOLVING NATIVE CORONARY ARTERY OF NATIVE HEART WITHOUT ANGINA PECTORIS: Primary | ICD-10-CM

## 2021-03-11 DIAGNOSIS — I10 ESSENTIAL HYPERTENSION: ICD-10-CM

## 2021-03-11 DIAGNOSIS — E78.01 FAMILIAL HYPERCHOLESTEROLEMIA: ICD-10-CM

## 2021-03-11 PROCEDURE — 99213 OFFICE O/P EST LOW 20 MIN: CPT | Performed by: NUCLEAR MEDICINE

## 2021-03-11 NOTE — PROGRESS NOTES
[id number] [facility] .  [address] [address]  Dept: [phone]  Dept Fax: [phone]  Loc: [phone]    Visit Date: 3/11/2021    Bipin Beltran is a 77 y.o. female who presents todayfor:  Chief Complaint   Patient presents with    Check-Up    Coronary Artery Disease    Hypertension    Hyperlipidemia     Previous cath   Mild CAD  No chest pain  No changes in breathing  Baseline dyspnea  BP is stable  No dizziness  No syncope      HPI:  HPI  Past Medical History:   Diagnosis Date    Anxiety     Arthritis     Asthma     Cancer ([address]     Depression     Diabetes mellitus ([address]     Headache(784.0)     Hyperlipidemia     Hypertension     Nausea & vomiting     Persistent proteinuria associated with type 2 diabetes mellitus [facility]     Renal cell carcinoma of left kidney ([facility] 12/6/2014    Unspecified cerebral artery occlusion with cerebral infarction       Past Surgical History:   Procedure Laterality Date    BREAST BIOPSY      CARDIAC CATHETERIZATION      CHOLECYSTECTOMY      COLONOSCOPY      ENDOSCOPY, COLON, DIAGNOSTIC      FINGER SURGERY  tumor    HERNIA REPAIR      HIATAL HERNIA REPAIR  7-    stomach repair    PARTIAL NEPHRECTOMY Left 11/21/2014    robotic left partial nephrectomy    TONSILLECTOMY      TUBAL LIGATION       Family History   Problem Relation Age of Onset    Cancer Mother     Heart Disease Mother     High Blood Pressure Mother     High Cholesterol Mother     Stroke Mother     Cancer Father     Heart Disease Father     High Blood Pressure Father     High Cholesterol Father     Cancer Sister      Social History     Tobacco Use    Smoking status: Never Smoker    Smokeless tobacco: Never Used   Substance Use Topics    Alcohol use: No      Current Outpatient Medications   Medication Sig Dispense Refill    nystatin (MYCOSTATIN) 000079 UNIT/GM cream Apply topically 2 times daily.  30 g Facility-Administered Medications Ordered in Other Visits   Medication Dose Route Frequency Provider Last Rate Last Admin    cefOXitin (MEFOXIN) 2 g in dextrose 5% 50 mL (mini-bag)  2 g Intravenous Once Norberto Hastings MD         Allergies   Allergen Reactions    Latex Hives     Patient noted in preop that she might be allergic - maybe breaking out when exposed.  Demerol Hcl [Meperidine] Nausea And Vomiting     confusion    Propoxyphene     Tape [place] Clos Tape]      Health Maintenance   Topic Date Due    DTaP/Tdap/Td vaccine (1 - Tdap) Never done    Shingles Vaccine (1 of 2) Never done    Diabetic retinal exam  10/20/2019    COVID-19 Vaccine (2 of 2 - Moderna series) 04/05/2021    A1C test (Diabetic or Prediabetic)  04/21/2021    Diabetic foot exam  06/10/2021    Lipid screen  06/10/2021    Annual Wellness Visit (AWV)  09/03/2021    Potassium monitoring  10/12/2021    Creatinine monitoring  10/12/2021    Colon cancer screen colonoscopy  01/05/2026    DEXA (modify frequency per FRAX score)  Completed    Flu vaccine  Completed    Pneumococcal 65+ years Vaccine  Completed    Hepatitis C screen  Completed    Hepatitis A vaccine  Aged Out    Hib vaccine  Aged Out    Meningococcal (ACWY) vaccine  Aged Out       Subjective:  Review of Systems  General:   No fever, no chills, some fatigue or weight loss  Pulmonary:    some dyspnea, no wheezing  Cardiac:    Denies recent chest pain,   GI:     No nausea or vomiting, no abdominal pain  Neuro:    No dizziness or light headedness,   Musculoskeletal:  No recent active issues  Extremities:   No edema, no obvious claudication       Objective:  Physical Exam  BP (!) 152/82   Pulse 84   Ht 5' 5\" (1.651 m)   Wt 234 lb (106.1 kg)   BMI 38.94 kg/m²   General:   Well developed, well nourished  Lungs:   Clear to auscultation  Heart:    Normal S1 S2, Slight murmur.  no rubs, no gallops  Abdomen:   Soft, non tender, no organomegalies, positive bowel sounds  Extremities:   No edema, no cyanosis, good peripheral pulses  Neurological:   Awake, alert, oriented. No obvious focal deficits  Musculoskelatal:  No obvious deformities    Assessment:      Diagnosis Orders   1. Coronary artery disease involving native coronary artery of native heart without angina pectoris     2. Essential hypertension     3. Familial hypercholesterolemia     as above  Cardiac fair       Plan:  No follow-ups on file. As above  Continue risk factor modification and medical management,  Thank you for allowing me to participate in the care of your patient. Please don't hesitate to contact me regarding any further issues related to the patient care    Orders Placed:  No orders of the defined types were placed in this encounter. Medications Prescribed:  No orders of the defined types were placed in this encounter. Discussed use, benefit, and side effects of prescribed medications. All patient questions answered. Pt voicedunderstanding. Instructed to continue current medications, diet and exercise. Continue risk factor modification and medical management. Patient agreed with treatment plan. Follow up as directed.     Electronically signedby James Bear MD on 3/11/2021 at 12:39 PM

## 2021-04-01 ENCOUNTER — OFFICE VISIT (OUTPATIENT)
Dept: FAMILY MEDICINE CLINIC | Age: 76
End: 2021-04-01
Payer: MEDICARE

## 2021-04-01 VITALS
HEART RATE: 72 BPM | BODY MASS INDEX: 37.67 KG/M2 | SYSTOLIC BLOOD PRESSURE: 134 MMHG | RESPIRATION RATE: 14 BRPM | TEMPERATURE: 96.6 F | WEIGHT: 226.4 LBS | DIASTOLIC BLOOD PRESSURE: 86 MMHG

## 2021-04-01 DIAGNOSIS — E11.29 TYPE 2 DIABETES MELLITUS WITH MICROALBUMINURIA, WITHOUT LONG-TERM CURRENT USE OF INSULIN (HCC): ICD-10-CM

## 2021-04-01 DIAGNOSIS — A04.72 C. DIFFICILE DIARRHEA: Primary | ICD-10-CM

## 2021-04-01 DIAGNOSIS — R80.9 TYPE 2 DIABETES MELLITUS WITH MICROALBUMINURIA, WITHOUT LONG-TERM CURRENT USE OF INSULIN (HCC): ICD-10-CM

## 2021-04-01 DIAGNOSIS — I10 BENIGN ESSENTIAL HTN: ICD-10-CM

## 2021-04-01 PROCEDURE — 99214 OFFICE O/P EST MOD 30 MIN: CPT | Performed by: FAMILY MEDICINE

## 2021-04-01 RX ORDER — VANCOMYCIN HYDROCHLORIDE 250 MG/1
250 CAPSULE ORAL 4 TIMES DAILY
Qty: 40 CAPSULE | Refills: 0 | Status: SHIPPED | OUTPATIENT
Start: 2021-04-01 | End: 2021-04-11

## 2021-04-01 ASSESSMENT — PATIENT HEALTH QUESTIONNAIRE - PHQ9
SUM OF ALL RESPONSES TO PHQ9 QUESTIONS 1 & 2: 0
SUM OF ALL RESPONSES TO PHQ QUESTIONS 1-9: 0
2. FEELING DOWN, DEPRESSED OR HOPELESS: 0
SUM OF ALL RESPONSES TO PHQ QUESTIONS 1-9: 0

## 2021-04-01 ASSESSMENT — ENCOUNTER SYMPTOMS
ABDOMINAL PAIN: 0
WHEEZING: 0
CHEST TIGHTNESS: 0
VOMITING: 0
DIARRHEA: 1
RHINORRHEA: 0
EYE PAIN: 0
SHORTNESS OF BREATH: 0
ABDOMINAL DISTENTION: 1
CONSTIPATION: 0
BLOOD IN STOOL: 0
SORE THROAT: 0
NAUSEA: 0
COUGH: 0
BACK PAIN: 0

## 2021-04-01 NOTE — PROGRESS NOTES
PA required for Vancomycin, completed through Cover My Meds.   Approvedtoday  PA Case: [id number], Status: Approved, Coverage Starts on: 12/31/2020 12:00:00 AM, Coverage Ends on: 4/15/2021 12:00:00 AM.

## 2021-04-01 NOTE — PATIENT INSTRUCTIONS
Patient Education        Clostridium Difficile Colitis: Care Instructions  Your Care Instructions     Clostridium difficile (also called C. difficile) are bacteria that can cause swelling and irritation of the large intestine, or colon. This inflammation is also called colitis. It can cause diarrhea, fever, and belly cramps. You may get C. difficile colitis if you take antibiotics. The infection is most common in people who are taking antibiotics while in the hospital. It is also common in older people in hospitals and nursing homes. Severe disease could cause the colon to swell to many times its normal size (toxic megacolon). This can cause death and needs emergency treatment. You may have a swollen belly that is painful or tender, a rapid heartbeat, and a fever. Follow-up care is a key part of your treatment and safety. Be sure to make and go to all appointments, and call your doctor if you are having problems. It's also a good idea to know your test results and keep a list of the medicines you take. How can you care for yourself at home? · Your doctor may give you antibiotics to treat C. difficile colitis. If your doctor prescribes an antibiotic, you will be given a different antibiotic than the one that caused your infection. Take your antibiotics as directed. Do not stop taking them just because you feel better. You need to take the full course of antibiotics. · To prevent dehydration, drink plenty of fluids. Choose water and other caffeine-free clear liquids until you feel better. If you have kidney, heart, or liver disease and have to limit fluids, talk with your doctor before you increase the amount of fluids you drink. · Begin eating small amounts of mild foods, if you feel like it. Try yogurt that has live cultures of lactobacillus (check the label). ? Avoid spicy foods, fruits, alcohol, and caffeine until 48 hours after all symptoms go away. ? Avoid chewing gum that contains sorbitol. ?  Avoid dairy products (except for yogurt with lactobacillus) while you have diarrhea and for 3 days after symptoms go away. · To prevent the spread of C. difficile, practice good hygiene. Keep your hands clean by washing them well and often with soap and clean, running water. This is most important after you use the bathroom and before you make and eat food. Remind everyone in your home to also wash their hands often. Alcohol-based hand sanitizers do not kill C. difficile. · After the diarrhea is better, you are much less likely to spread C. difficile. But you still need to take extra care to keep your home clean. ? Clean bathroom surfaces with a bleach solution to kill any C. difficile spores. To dilute household bleach, follow the directions on the label. When should you call for help? Call 911 if:     · You passed out (lost consciousness). Call your doctor now or seek immediate medical care if:    · You have a fever over 101°F or shaking chills.     · You feel lightheaded or have a fast heart rate.     · You pass stools that are almost always bloody.     · You have signs of needing more fluids. You have sunken eyes, a dry mouth, and pass only a little urine.     · You have severe belly pain with or without bloating.     · You have severe vomiting and cannot keep down liquids.     · You are not passing any stools or gas. Watch closely for changes in your health, and be sure to contact your doctor if:    · You do not get better as expected. Where can you learn more? Go to https://Nopsec.Supersolid. org and sign in to your Dealer.com account. Enter [phone] in the [facility] box to learn more about \"Clostridium Difficile Colitis: Care Instructions. \"     If you do not have an account, please click on the \"Sign Up Now\" link. Current as of: September 23, 2020               Content Version: 12.8  © 5855-3122 Healthwise, Incorporated. Care instructions adapted under license by [facility] ([facility]).  If you have questions about a medical condition or this instruction, always ask your healthcare professional. [address] any warranty or liability for your use of this information. Patient Education        Type 2 Diabetes: Care Instructions  Your Care Instructions     Type 2 diabetes is a disease that develops when the body's tissues cannot use insulin properly. Over time, the pancreas cannot make enough insulin. Insulin is a hormone that helps the body's cells use sugar (glucose) for energy. It also helps the body store extra sugar in muscle, fat, and liver cells. Without insulin, the sugar cannot get into the cells to do its work. It stays in the blood instead. This can cause high blood sugar levels. A person has diabetes when the blood sugar stays too high too much of the time. Over time, diabetes can lead to diseases of the heart, blood vessels, nerves, kidneys, and eyes. You may be able to control your blood sugar by losing weight, eating a healthy diet, and getting daily exercise. You may also have to take insulin or other diabetes medicine. Follow-up care is a key part of your treatment and safety. Be sure to make and go to all appointments. Call your doctor if you are having problems. It's also a good idea to know your test results and keep a list of the medicines you take. How can you care for yourself at home? · Keep your blood sugar at a target level (which you set with your doctor). ? Carbohydrate--the body's main source of fuel--affects blood sugar more than any other nutrient. Carbohydrate is in fruits, vegetables, milk, and yogurt. It also is in breads, cereals, vegetables such as potatoes and corn, and sugary foods such as candy and cakes. Follow your meal plan to know how much carbohydrate to eat at each meal and snack. ? Aim for 30 minutes of exercise on most, preferably all, days of the week. Walking is a good choice.  You also may want to do other activities, such as running, swimming, cycling, or playing tennis or team sports. Try to do muscle-strengthening exercises at least 2 times a week. ? Take your medicines exactly as prescribed. Call your doctor if you think you are having a problem with your medicine. You will get more details on the specific medicines your doctor prescribes. · Check your blood sugar as often as your doctor recommends. It is important to keep track of any symptoms you have, such as low blood sugar. Also tell your doctor if you have any changes in your activities, diet, or insulin use. · Talk to your doctor before you start taking aspirin every day. Aspirin can help certain people lower their risk of a heart attack or stroke. But taking aspirin isn't right for everyone, because it can cause serious bleeding. · Do not smoke. If you need help quitting, talk to your doctor about stop-smoking programs and medicines. These can increase your chances of quitting for good. · Keep your cholesterol and blood pressure at normal levels. You may need to take one or more medicines to reach your goals. Take them exactly as directed. Do not stop or change a medicine without talking to your doctor first.  When should you call for help? Call 911 anytime you think you may need emergency care. For example, call if:    · You passed out (lost consciousness), or you suddenly become very sleepy or confused. (You may have very low blood sugar.)   Call your doctor now or seek immediate medical care if:    · Your blood sugar is 300 mg/dL or is higher than the level your doctor has set for you.     · You have symptoms of low blood sugar, such as:  ? Sweating. ? Feeling nervous, shaky, and weak. ? Extreme hunger and slight nausea. ? Dizziness and headache.  ? Blurred vision. ? Confusion.    Watch closely for changes in your health, and be sure to contact your doctor if:    · You often have problems controlling your blood sugar.     · You have symptoms of long-term diabetes problems, such as:  ? New vision changes. ? New pain, numbness, or tingling in your hands or feet. ? Skin problems. Where can you learn more? Go to https://Cognitive Securitylincolnwilfredo.Game Nation. org and sign in to your [employer] account. Enter C553 in the [employer] box to learn more about \"Type 2 Diabetes: Care Instructions. \"     If you do not have an account, please click on the \"Sign Up Now\" link. Current as of: August 31, 2020               Content Version: 12.8  © 2006-2021 [employer]. Care instructions adapted under license by [facility] ([facility]). If you have questions about a medical condition or this instruction, always ask your healthcare professional. [address] any warranty or liability for your use of this information. Patient Education        High Blood Pressure: Care Instructions  Overview     It's normal for blood pressure to go up and down throughout the day. But if it stays up, you have high blood pressure. Another name for high blood pressure is hypertension. Despite what a lot of people think, high blood pressure usually doesn't cause headaches or make you feel dizzy or lightheaded. It usually has no symptoms. But it does increase your risk of stroke, heart attack, and other problems. You and your doctor will talk about your risks of these problems based on your blood pressure. Your doctor will give you a goal for your blood pressure. Your goal will be based on your health and your age. Lifestyle changes, such as eating healthy and being active, are always important to help lower blood pressure. You might also take medicine to reach your blood pressure goal.  Follow-up care is a key part of your treatment and safety. Be sure to make and go to all appointments, and call your doctor if you are having problems. It's also a good idea to know your test results and keep a list of the medicines you take.   How can you care for yourself at home?  Medical treatment  · If you stop taking your medicine, your blood pressure will go back up. You may take one or more types of medicine to lower your blood pressure. Be safe with medicines. Take your medicine exactly as prescribed. Call your doctor if you think you are having a problem with your medicine. · Talk to your doctor before you start taking aspirin every day. Aspirin can help certain people lower their risk of a heart attack or stroke. But taking aspirin isn't right for everyone, because it can cause serious bleeding. · See your doctor regularly. You may need to see the doctor more often at first or until your blood pressure comes down. · If you are taking blood pressure medicine, talk to your doctor before you take decongestants or anti-inflammatory medicine, such as ibuprofen. Some of these medicines can raise blood pressure. · Learn how to check your blood pressure at home. Lifestyle changes  · Stay at a healthy weight. This is especially important if you put on weight around the waist. Losing even 10 pounds can help you lower your blood pressure. · If your doctor recommends it, get more exercise. Walking is a good choice. Bit by bit, increase the amount you walk every day. Try for at least 30 minutes on most days of the week. You also may want to swim, bike, or do other activities. · Avoid or limit alcohol. Talk to your doctor about whether you can drink any alcohol. · Try to limit how much sodium you eat to less than 2,300 milligrams (mg) a day. Your doctor may ask you to try to eat less than 1,500 mg a day. · Eat plenty of fruits (such as bananas and oranges), vegetables, legumes, whole grains, and low-fat dairy products. · Lower the amount of saturated fat in your diet. Saturated fat is found in animal products such as milk, cheese, and meat. Limiting these foods may help you lose weight and also lower your risk for heart disease. · Do not smoke.  Smoking increases your risk for heart attack and stroke. If you need help quitting, talk to your doctor about stop-smoking programs and medicines. These can increase your chances of quitting for good. When should you call for help? Call  911 anytime you think you may need emergency care. This may mean having symptoms that suggest that your blood pressure is causing a serious heart or blood vessel problem. Your blood pressure may be over 180/120. For example, call 911 if:    · You have symptoms of a heart attack. These may include:  ? Chest pain or pressure, or a strange feeling in the chest.  ? Sweating. ? Shortness of breath. ? Nausea or vomiting. ? Pain, pressure, or a strange feeling in the back, neck, jaw, or upper belly or in one or both shoulders or arms. ? Lightheadedness or sudden weakness. ? A fast or irregular heartbeat.     · You have symptoms of a stroke. These may include:  ? Sudden numbness, tingling, weakness, or loss of movement in your face, arm, or leg, especially on only one side of your body. ? Sudden vision changes. ? Sudden trouble speaking. ? Sudden confusion or trouble understanding simple statements. ? Sudden problems with walking or balance. ? A sudden, severe headache that is different from past headaches.     · You have severe back or belly pain. Do not wait until your blood pressure comes down on its own. Get help right away. Call your doctor now or seek immediate care if:    · Your blood pressure is much higher than normal (such as 180/120 or higher), but you don't have symptoms.     · You think high blood pressure is causing symptoms, such as:  ? Severe headache.  ? Blurry vision. Watch closely for changes in your health, and be sure to contact your doctor if:    · Your blood pressure measures higher than your doctor recommends at least 2 times.  That means the top number is higher or the bottom number is higher, or both.     · You think you may be having side effects from your blood pressure medicine. Where can you learn more? Go to https://chpepiceweb.Betify. org and sign in to your ScanDigital account. Enter N776 in the [employer] box to learn more about \"High Blood Pressure: Care Instructions. \"     If you do not have an account, please click on the \"Sign Up Now\" link. Current as of: August 31, 2020               Content Version: 12.8  © 2006-2021 Healthwise, Incorporated. Care instructions adapted under license by [facility] ([facility]). If you have questions about a medical condition or this instruction, always ask your healthcare professional. [address] any warranty or liability for your use of this information.

## 2021-04-01 NOTE — PROGRESS NOTES
Benita Zheng (:  1945) is a 77 y.o. female,Established patient, here for evaluation of the following chief complaint(s):  Diarrhea (chele has c-diff)      ASSESSMENT/PLAN:  1. C. difficile diarrhea  -     vancomycin (VANCOCIN) 250 MG capsule; Take 1 capsule by mouth 4 times daily for 10 days, Disp-40 capsule, R-0Normal  -new problem, treat with vanco, monitor diarrhea sxs for resolution, bland diet  2. Type 2 diabetes mellitus with microalbuminuria, without long-term current use of insulin (HCC)   -stable on diet/exercise control,   Lab Results   Component Value Date    LABA1C 5.8 2021     No results found for: EAG    3. Benign essential HTN   -stable on propranolol. No follow-ups on file. SUBJECTIVE/OBJECTIVE:  HPI   Pt here for check up. Reviewed BMI of 38. Encouraged diet, exercise and weight loss. Has 2 weeks of diarrhea, foul smelling. [address] daughter had c diff.  has similar sxs. Otherwise is doing well. No fever or chills. , nonsmoker, pmh reviewed. Review of Systems   Constitutional: Negative for chills, fatigue, fever and unexpected weight change. HENT: Negative for congestion, ear pain, rhinorrhea and sore throat. Eyes: Negative for pain and visual disturbance. Respiratory: Negative for cough, chest tightness, shortness of breath and wheezing. Cardiovascular: Negative for chest pain and palpitations. Gastrointestinal: Positive for abdominal distention and diarrhea. Negative for abdominal pain, blood in stool, constipation, nausea and vomiting. Genitourinary: Negative for difficulty urinating, frequency, hematuria and urgency. Musculoskeletal: Negative for back pain, joint swelling, myalgias and neck pain. Skin: Negative for rash. Neurological: Negative for dizziness and headaches. Hematological: Negative for adenopathy. Does not bruise/bleed easily. Psychiatric/Behavioral: Negative for behavioral problems and sleep disturbance. The patient is not nervous/anxious. Physical Exam  Vitals signs and nursing note reviewed. Constitutional:       Appearance: She is well-developed. HENT:      Head: Normocephalic and atraumatic. Right Ear: External ear normal.      Left Ear: External ear normal.      Nose: Nose normal.      Mouth/Throat:      Mouth: Mucous membranes are moist.   Eyes:      Pupils: Pupils are equal, round, and reactive to light. Neck:      Musculoskeletal: Neck supple. Thyroid: No thyromegaly. Vascular: No carotid bruit. Cardiovascular:      Rate and Rhythm: Normal rate and regular rhythm. Heart sounds: Normal heart sounds. Pulmonary:      Breath sounds: Normal breath sounds. No wheezing or rales. Abdominal:      General: Bowel sounds are normal. There is distension. Palpations: Abdomen is soft. Tenderness: There is no abdominal tenderness. There is no guarding or rebound. Musculoskeletal: Normal range of motion. Lymphadenopathy:      Cervical: No cervical adenopathy. Skin:     General: Skin is warm and dry. Findings: No rash. Neurological:      Mental Status: She is alert and oriented to person, place, and time. Cranial Nerves: No cranial nerve deficit. Deep Tendon Reflexes: Reflexes are normal and symmetric. An electronic signature was used to authenticate this note.     --Inessa Lubin MD

## 2021-04-23 ENCOUNTER — OFFICE VISIT (OUTPATIENT)
Dept: FAMILY MEDICINE CLINIC | Age: 76
End: 2021-04-23
Payer: MEDICARE

## 2021-04-23 VITALS
OXYGEN SATURATION: 97 % | BODY MASS INDEX: 37.08 KG/M2 | SYSTOLIC BLOOD PRESSURE: 124 MMHG | DIASTOLIC BLOOD PRESSURE: 74 MMHG | TEMPERATURE: 97.7 F | WEIGHT: 222.8 LBS | RESPIRATION RATE: 16 BRPM | HEART RATE: 76 BPM

## 2021-04-23 DIAGNOSIS — B35.6 TINEA CRURIS: Primary | ICD-10-CM

## 2021-04-23 DIAGNOSIS — R80.9 TYPE 2 DIABETES MELLITUS WITH MICROALBUMINURIA, WITHOUT LONG-TERM CURRENT USE OF INSULIN (HCC): ICD-10-CM

## 2021-04-23 DIAGNOSIS — F41.9 ANXIETY: ICD-10-CM

## 2021-04-23 DIAGNOSIS — E11.29 TYPE 2 DIABETES MELLITUS WITH MICROALBUMINURIA, WITHOUT LONG-TERM CURRENT USE OF INSULIN (HCC): ICD-10-CM

## 2021-04-23 PROCEDURE — 99214 OFFICE O/P EST MOD 30 MIN: CPT | Performed by: FAMILY MEDICINE

## 2021-04-23 RX ORDER — LORAZEPAM 0.5 MG/1
0.5 TABLET ORAL EVERY 12 HOURS PRN
Qty: 60 TABLET | Refills: 2 | Status: SHIPPED | OUTPATIENT
Start: 2021-04-23 | End: 2021-07-22

## 2021-04-23 RX ORDER — FLUCONAZOLE 100 MG/1
TABLET ORAL
Qty: 30 TABLET | Refills: 0 | Status: SHIPPED | OUTPATIENT
Start: 2021-04-23 | End: 2021-06-29 | Stop reason: SDUPTHER

## 2021-04-23 ASSESSMENT — ENCOUNTER SYMPTOMS
CONSTIPATION: 0
BACK PAIN: 0
WHEEZING: 0
COUGH: 0
SORE THROAT: 0
EYE PAIN: 0
ABDOMINAL PAIN: 0
DIARRHEA: 0
CHEST TIGHTNESS: 0
BLOOD IN STOOL: 0
VOMITING: 0
NAUSEA: 0
SHORTNESS OF BREATH: 0
RHINORRHEA: 0

## 2021-04-23 NOTE — PROGRESS NOTES
for abdominal pain, blood in stool, constipation, diarrhea, nausea and vomiting. Genitourinary: Negative for difficulty urinating, frequency, hematuria and urgency. Musculoskeletal: Negative for back pain, joint swelling, myalgias and neck pain. Skin: Positive for rash. Neurological: Negative for dizziness and headaches. Hematological: Negative for adenopathy. Does not bruise/bleed easily. Psychiatric/Behavioral: Negative for behavioral problems and sleep disturbance. The patient is nervous/anxious. Physical Exam  Vitals signs and nursing note reviewed. Constitutional:       Appearance: She is well-developed. HENT:      Head: Normocephalic and atraumatic. Right Ear: External ear normal.      Left Ear: External ear normal.      Nose: Nose normal.      Mouth/Throat:      Mouth: Mucous membranes are moist.   Eyes:      Pupils: Pupils are equal, round, and reactive to light. Neck:      Musculoskeletal: Neck supple. Thyroid: No thyromegaly. Cardiovascular:      Rate and Rhythm: Normal rate and regular rhythm. Heart sounds: Normal heart sounds. Pulmonary:      Breath sounds: Normal breath sounds. No wheezing or rales. Abdominal:      General: Bowel sounds are normal.      Palpations: Abdomen is soft. Tenderness: There is no abdominal tenderness. There is no guarding or rebound. Musculoskeletal: Normal range of motion. Lymphadenopathy:      Cervical: No cervical adenopathy. Skin:     General: Skin is warm and dry. Findings: No rash. Neurological:      Mental Status: She is alert and oriented to person, place, and time. Cranial Nerves: No cranial nerve deficit. Deep Tendon Reflexes: Reflexes are normal and symmetric. An electronic signature was used to authenticate this note.     --Shila Walker MD

## 2021-04-23 NOTE — PATIENT INSTRUCTIONS
use.  · Keep your groin, inner thighs, and buttocks clean and dry, especially after you exercise and shower. · Do not borrow or lend clothing, sports equipment, towels, or sheets. · Wear slippers or sandals in locker rooms, showers, and public bathing areas. When should you call for help? Call your doctor now or seek immediate medical care if:    · You have signs of infection, such as:  ? Increased pain, swelling, warmth, or redness. ? Red streaks leading from the rash. ? Pus draining from the rash. ? A fever. Watch closely for changes in your health, and be sure to contact your doctor if:    · You do not get better as expected. Where can you learn more? Go to https://Meshfire.Jobspot. org and sign in to your Interviewstreet account. Enter G303 in the Bycler box to learn more about \"Jock Itch: Care Instructions. \"     If you do not have an account, please click on the \"Sign Up Now\" link. Current as of: July 2, 2020               Content Version: 12.8  © 2006-2021 [employer]. Care instructions adapted under license by [facility] ([facility]). If you have questions about a medical condition or this instruction, always ask your healthcare professional. [address] any warranty or liability for your use of this information. Patient Education        Type 2 Diabetes: Care Instructions  Your Care Instructions     Type 2 diabetes is a disease that develops when the body's tissues cannot use insulin properly. Over time, the pancreas cannot make enough insulin. Insulin is a hormone that helps the body's cells use sugar (glucose) for energy. It also helps the body store extra sugar in muscle, fat, and liver cells. Without insulin, the sugar cannot get into the cells to do its work. It stays in the blood instead. This can cause high blood sugar levels. A person has diabetes when the blood sugar stays too high too much of the time.  Over time, diabetes can Instructions  Your Care Instructions     Anxiety is a normal reaction to stress. Difficult situations can cause you to have symptoms such as sweaty palms and a nervous feeling. In an anxiety disorder, the symptoms are far more severe. Constant worry, muscle tension, trouble sleeping, nausea and diarrhea, and other symptoms can make normal daily activities difficult or impossible. These symptoms may occur for no reason, and they can affect your work, school, or social life. Medicines, counseling, and self-care can all help. Follow-up care is a key part of your treatment and safety. Be sure to make and go to all appointments, and call your doctor if you are having problems. It's also a good idea to know your test results and keep a list of the medicines you take. How can you care for yourself at home? · Take medicines exactly as directed. Call your doctor if you think you are having a problem with your medicine. · Go to your counseling sessions and follow-up appointments. · Recognize and accept your anxiety. Then, when you are in a situation that makes you anxious, say to yourself, \"This is not an emergency. I feel uncomfortable, but I am not in danger. I can keep going even if I feel anxious. \"  · Be kind to your body:  ? Relieve tension with exercise or a massage. ? Get enough rest.  ? Avoid alcohol, caffeine, nicotine, and illegal drugs. They can increase your anxiety level and cause sleep problems. ? Learn and do relaxation techniques. See below for more about these techniques. · Engage your mind. Get out and do something you enjoy. Go to a funny movie, or take a walk or hike. Plan your day. Having too much or too little to do can make you anxious. · Keep a record of your symptoms. Discuss your fears with a good friend or family member, or join a support group for people with similar problems. Talking to others sometimes relieves stress. · Get involved in social groups, or volunteer to help others.  Being alone sometimes makes things seem worse than they are. · Get at least 30 minutes of exercise on most days of the week to relieve stress. Walking is a good choice. You also may want to do other activities, such as running, swimming, cycling, or playing tennis or team sports. Relaxation techniques  Do relaxation exercises 10 to 20 minutes a day. You can play soothing, relaxing music while you do them, if you wish. · Tell others in your house that you are going to do your relaxation exercises. Ask them not to disturb you. · Find a comfortable place, away from all distractions and noise. · Lie down on your back, or sit with your back straight. · Focus on your breathing. Make it slow and steady. · Breathe in through your nose. Breathe out through either your nose or mouth. · Breathe deeply, filling up the area between your navel and your rib cage. Breathe so that your belly goes up and down. · Do not hold your breath. · Breathe like this for 5 to 10 minutes. Notice the feeling of calmness throughout your whole body. As you continue to breathe slowly and deeply, relax by doing the following for another 5 to 10 minutes:  · Tighten and relax each muscle group in your body. You can begin at your toes and work your way up to your head. · Imagine your muscle groups relaxing and becoming heavy. · Empty your mind of all thoughts. · Let yourself relax more and more deeply. · Become aware of the state of calmness that surrounds you. · When your relaxation time is over, you can bring yourself back to alertness by moving your fingers and toes and then your hands and feet and then stretching and moving your entire body. Sometimes people fall asleep during relaxation, but they usually wake up shortly afterward. · Always give yourself time to return to full alertness before you drive a car or do anything that might cause an accident if you are not fully alert. Never play a relaxation tape while you drive a car.   When should you call for help? Call 911 anytime you think you may need emergency care. For example, call if:    · You feel you cannot stop from hurting yourself or someone else. Keep the numbers for these national suicide hotlines: 0-987-905-TALK (5-835.297.9452) and 0-028-TNYESUA (0-458.989.7135). If you or someone you know talks about suicide or feeling hopeless, get help right away. Watch closely for changes in your health, and be sure to contact your doctor if:    · You have anxiety or fear that affects your life.     · You have symptoms of anxiety that are new or different from those you had before. Where can you learn more? Go to https://Tapgage.SimplyTapp. org and sign in to your Dynadec account. Enter P754 in the Samatoa box to learn more about \"Anxiety Disorder: Care Instructions. \"     If you do not have an account, please click on the \"Sign Up Now\" link. Current as of: September 23, 2020               Content Version: 12.8  © 2586-4355 [employer]. Care instructions adapted under license by [facility] [place] ([facility]). If you have questions about a medical condition or this instruction, always ask your healthcare professional. [address] any warranty or liability for your use of this information.

## 2021-06-10 DIAGNOSIS — I10 BENIGN ESSENTIAL HTN: ICD-10-CM

## 2021-06-10 DIAGNOSIS — F32.A DEPRESSION, UNSPECIFIED DEPRESSION TYPE: ICD-10-CM

## 2021-06-10 RX ORDER — CITALOPRAM 20 MG/1
TABLET ORAL
Qty: 180 TABLET | Refills: 3 | OUTPATIENT
Start: 2021-06-10

## 2021-06-10 RX ORDER — PROPRANOLOL HYDROCHLORIDE 120 MG/1
CAPSULE, EXTENDED RELEASE ORAL
Qty: 90 CAPSULE | Refills: 3 | OUTPATIENT
Start: 2021-06-10

## 2021-06-10 NOTE — TELEPHONE ENCOUNTER
Nicole Carroll needs refill of   Requested Prescriptions     Pending Prescriptions Disp Refills    citalopram (CELEXA) 20 MG tablet [Pharmacy Med Name: CITALOPRAM HBR 20 MG TABLET] 180 tablet 3     Sig: TAKE 1 TABLET BY MOUTH TWICE A DAY    propranolol (INDERAL LA) 120 MG extended release capsule [Pharmacy Med Name: PROPRANOLOL  MG CAPSULE] 90 capsule 3     Sig: TAKE 1 CAPSULE BY MOUTH EVERY DAY       Last Filled on:  06/10/2020    Last Visit Date:  4/23/2021    Next Visit Date:    Visit date not found      Pt is due for annual appointment, please call and schedule

## 2021-06-29 ENCOUNTER — OFFICE VISIT (OUTPATIENT)
Dept: FAMILY MEDICINE CLINIC | Age: 76
End: 2021-06-29
Payer: MEDICARE

## 2021-06-29 VITALS
BODY MASS INDEX: 36.28 KG/M2 | HEART RATE: 68 BPM | DIASTOLIC BLOOD PRESSURE: 84 MMHG | SYSTOLIC BLOOD PRESSURE: 126 MMHG | OXYGEN SATURATION: 98 % | RESPIRATION RATE: 16 BRPM | TEMPERATURE: 97.5 F | WEIGHT: 218 LBS

## 2021-06-29 DIAGNOSIS — F32.A DEPRESSION, UNSPECIFIED DEPRESSION TYPE: ICD-10-CM

## 2021-06-29 DIAGNOSIS — Z00.00 ANNUAL PHYSICAL EXAM: Primary | ICD-10-CM

## 2021-06-29 DIAGNOSIS — R60.9 PERIPHERAL EDEMA: ICD-10-CM

## 2021-06-29 DIAGNOSIS — J45.41 MODERATE PERSISTENT ASTHMA WITH EXACERBATION: ICD-10-CM

## 2021-06-29 DIAGNOSIS — K21.9 GASTROESOPHAGEAL REFLUX DISEASE, UNSPECIFIED WHETHER ESOPHAGITIS PRESENT: ICD-10-CM

## 2021-06-29 DIAGNOSIS — R73.9 HYPERGLYCEMIA: ICD-10-CM

## 2021-06-29 DIAGNOSIS — I10 BENIGN ESSENTIAL HTN: ICD-10-CM

## 2021-06-29 DIAGNOSIS — E78.00 HYPERCHOLESTEROLEMIA: ICD-10-CM

## 2021-06-29 DIAGNOSIS — E11.29 TYPE 2 DIABETES MELLITUS WITH MICROALBUMINURIA, WITHOUT LONG-TERM CURRENT USE OF INSULIN (HCC): ICD-10-CM

## 2021-06-29 DIAGNOSIS — F51.01 PRIMARY INSOMNIA: ICD-10-CM

## 2021-06-29 DIAGNOSIS — J45.40 MODERATE PERSISTENT ASTHMA WITHOUT COMPLICATION: ICD-10-CM

## 2021-06-29 DIAGNOSIS — R80.9 TYPE 2 DIABETES MELLITUS WITH MICROALBUMINURIA, WITHOUT LONG-TERM CURRENT USE OF INSULIN (HCC): ICD-10-CM

## 2021-06-29 DIAGNOSIS — B35.6 TINEA CRURIS: ICD-10-CM

## 2021-06-29 DIAGNOSIS — C64.2 RENAL CELL CARCINOMA OF LEFT KIDNEY (HCC): ICD-10-CM

## 2021-06-29 PROCEDURE — 99397 PER PM REEVAL EST PAT 65+ YR: CPT | Performed by: FAMILY MEDICINE

## 2021-06-29 RX ORDER — BUDESONIDE AND FORMOTEROL FUMARATE DIHYDRATE 160; 4.5 UG/1; UG/1
2 AEROSOL RESPIRATORY (INHALATION) 2 TIMES DAILY
Qty: 1 INHALER | Refills: 11 | Status: SHIPPED | OUTPATIENT
Start: 2021-06-29 | End: 2022-06-22

## 2021-06-29 RX ORDER — DOXEPIN HYDROCHLORIDE 10 MG/1
10 CAPSULE ORAL NIGHTLY
Qty: 30 CAPSULE | Refills: 11 | Status: SHIPPED | OUTPATIENT
Start: 2021-06-29 | End: 2022-07-13 | Stop reason: SDUPTHER

## 2021-06-29 RX ORDER — PROPRANOLOL HYDROCHLORIDE 120 MG/1
120 CAPSULE, EXTENDED RELEASE ORAL DAILY
Qty: 30 CAPSULE | Refills: 11 | Status: SHIPPED | OUTPATIENT
Start: 2021-06-29 | End: 2022-06-22

## 2021-06-29 RX ORDER — FAMOTIDINE 20 MG/1
20 TABLET, FILM COATED ORAL 2 TIMES DAILY
Qty: 60 TABLET | Refills: 11 | Status: SHIPPED | OUTPATIENT
Start: 2021-06-29 | End: 2022-06-22

## 2021-06-29 RX ORDER — SIMVASTATIN 40 MG
40 TABLET ORAL NIGHTLY
Qty: 30 TABLET | Refills: 11 | Status: SHIPPED | OUTPATIENT
Start: 2021-06-29 | End: 2022-06-22

## 2021-06-29 RX ORDER — CITALOPRAM 20 MG/1
20 TABLET ORAL 2 TIMES DAILY
Qty: 60 TABLET | Refills: 11 | Status: SHIPPED | OUTPATIENT
Start: 2021-06-29 | End: 2022-07-13 | Stop reason: SDUPTHER

## 2021-06-29 RX ORDER — BUMETANIDE 0.5 MG/1
0.5 TABLET ORAL DAILY
Qty: 30 TABLET | Refills: 11 | Status: SHIPPED | OUTPATIENT
Start: 2021-06-29 | End: 2022-06-22

## 2021-06-29 RX ORDER — OMEPRAZOLE 40 MG/1
40 CAPSULE, DELAYED RELEASE ORAL DAILY
Qty: 30 CAPSULE | Refills: 11 | Status: SHIPPED | OUTPATIENT
Start: 2021-06-29 | End: 2022-06-22

## 2021-06-29 RX ORDER — FLUCONAZOLE 100 MG/1
TABLET ORAL
Qty: 30 TABLET | Refills: 11 | Status: SHIPPED | OUTPATIENT
Start: 2021-06-29 | End: 2022-06-08

## 2021-06-29 SDOH — ECONOMIC STABILITY: FOOD INSECURITY: WITHIN THE PAST 12 MONTHS, THE FOOD YOU BOUGHT JUST DIDN'T LAST AND YOU DIDN'T HAVE MONEY TO GET MORE.: NEVER TRUE

## 2021-06-29 SDOH — ECONOMIC STABILITY: FOOD INSECURITY: WITHIN THE PAST 12 MONTHS, YOU WORRIED THAT YOUR FOOD WOULD RUN OUT BEFORE YOU GOT MONEY TO BUY MORE.: NEVER TRUE

## 2021-06-29 ASSESSMENT — ENCOUNTER SYMPTOMS
DIARRHEA: 0
ABDOMINAL PAIN: 0
SHORTNESS OF BREATH: 0
WHEEZING: 0
RHINORRHEA: 0
CHEST TIGHTNESS: 0
CONSTIPATION: 0
BLOOD IN STOOL: 0
COUGH: 0
VOMITING: 0
NAUSEA: 0
EYE PAIN: 0
BACK PAIN: 0
SORE THROAT: 0

## 2021-06-29 ASSESSMENT — SOCIAL DETERMINANTS OF HEALTH (SDOH): HOW HARD IS IT FOR YOU TO PAY FOR THE VERY BASICS LIKE FOOD, HOUSING, MEDICAL CARE, AND HEATING?: NOT HARD AT ALL

## 2021-06-29 NOTE — PROGRESS NOTES
2021    Marigene Opitz (:  1945) is a 77 y.o. female, here for a preventive medicine evaluation. Patient Active Problem List   Diagnosis    Pharyngoesophageal dysphagia    Hoarseness    History of renal cell cancer       Review of Systems   Constitutional: Negative for chills, fatigue, fever and unexpected weight change. HENT: Negative for congestion, ear pain, rhinorrhea and sore throat. Eyes: Negative for pain and visual disturbance. Respiratory: Negative for cough, chest tightness, shortness of breath and wheezing. Cardiovascular: Negative for chest pain and palpitations. Gastrointestinal: Negative for abdominal pain, blood in stool, constipation, diarrhea, nausea and vomiting. Genitourinary: Negative for difficulty urinating, frequency, hematuria and urgency. Musculoskeletal: Negative for back pain, joint swelling, myalgias and neck pain. Skin: Positive for rash. Neurological: Negative for dizziness and headaches. Hematological: Negative for adenopathy. Does not bruise/bleed easily. Psychiatric/Behavioral: Negative for behavioral problems and sleep disturbance. The patient is not nervous/anxious. Prior to Visit Medications    Medication Sig Taking?  Authorizing Provider   famotidine (PEPCID) 20 MG tablet Take 1 tablet by mouth 2 times daily Yes Nida Gilbert MD   VENTOLIN  (90 Base) MCG/ACT inhaler INHALE 2 PUFFS BY MOUTH 4 TIMES A DAY AS NEEDED Yes Nida Gilbert MD   ipratropium (ATROVENT) 0.02 % nebulizer solution Take 2.5 mLs by nebulization 4 times daily Yes Nida Gilbert MD   budesonide-formoterol (SYMBICORT) 160-4.5 MCG/ACT AERO Inhale 2 puffs into the lungs 2 times daily Yes Nida Gilbert MD   propranolol (INDERAL LA) 120 MG extended release capsule Take 1 capsule by mouth daily Yes Nida Gilbert MD   citalopram (CELEXA) 20 MG tablet Take 1 tablet by mouth 2 times daily Yes Nida Gilbert MD   simvastatin (ZOCOR) 40 MG tablet Take 1 tablet by mouth nightly Yes Valeria Kelsey MD   doxepin (SINEQUAN) 10 MG capsule Take 1 capsule by mouth nightly Yes Valeria Kelsey MD   omeprazole (PRILOSEC) 40 MG delayed release capsule Take 1 capsule by mouth daily Yes Valeria Kelsey MD   bumetanide (BUMEX) 0.5 MG tablet Take 1 tablet by mouth daily Yes Valeria Kelsey MD   fluconazole (DIFLUCAN) 100 MG tablet TAKE 1 TABLET BY MOUTH EVERY DAY Yes Valeria Kelsey MD   LORazepam (ATIVAN) 0.5 MG tablet Take 1 tablet by mouth every 12 hours as needed for Anxiety for up to 90 days. F41.9 Yes Valeria Kelsey MD   nitroGLYCERIN (NITROSTAT) 0.4 MG SL tablet Place 1 tablet under the tongue every 5 minutes as needed for Chest pain Yes Pedro Nettles MD   Probiotic Product ([employer] PROBIOTIC) CAPS Take by mouth Yes Historical Provider, MD   oxybutynin (DITROPAN-XL) 5 MG extended release tablet Take 5 mg by mouth daily Yes Historical Provider, MD   diclofenac sodium 1 % GEL Apply 2 g topically 4 times daily Yes Sixto Mackey MD   acetaminophen (TYLENOL) 500 MG tablet Take 500 mg by mouth every 6 hours as needed for Pain Yes Historical Provider, MD   Cholecalciferol (VITAMIN D3) 5000 units TABS Take by mouth daily  Yes Historical Provider, MD   aspirin 81 MG tablet Take 81 mg by mouth daily Yes Historical Provider, MD   fexofenadine (ALLEGRA) 180 MG tablet Take 180 mg by mouth daily. Yes Historical Provider, MD   Multiple Vitamin (MULTI-VITAMINS PO) Take  by mouth daily. Yes Historical Provider, MD        Allergies   Allergen Reactions    Latex Hives     Patient noted in preop that she might be allergic - maybe breaking out when exposed.     Demerol Hcl [Meperidine] Nausea And Vomiting     confusion    Propoxyphene     Tape Adam Shines Tape]        Past Medical History:   Diagnosis Date    Anxiety     Arthritis     Asthma     [facility] [address]     Depression     Diabetes mellitus ([facility] [address]     Headache(784.0)     Hyperlipidemia     Hypertension     Nausea & vomiting     Persistent proteinuria associated with type 2 diabetes mellitus (HCC)     Renal cell carcinoma of left kidney ([facility] [address] 12/6/2014    Unspecified cerebral artery occlusion with cerebral infarction        Past Surgical History:   Procedure Laterality Date    BREAST BIOPSY      CARDIAC CATHETERIZATION      CHOLECYSTECTOMY      COLONOSCOPY      ENDOSCOPY, COLON, DIAGNOSTIC      FINGER SURGERY  tumor    HERNIA REPAIR      HIATAL HERNIA REPAIR  7-    stomach repair    PARTIAL NEPHRECTOMY Left 11/21/2014    robotic left partial nephrectomy    TONSILLECTOMY      TUBAL LIGATION         Social History     Socioeconomic History    Marital status:      Spouse name: Not on file    Number of children: Not on file    Years of education: Not on file    Highest education level: Not on file   Occupational History    Not on file   Tobacco Use    Smoking status: Never Smoker    Smokeless tobacco: Never Used   Vaping Use    Vaping Use: Never used   Substance and Sexual Activity    Alcohol use: No    Drug use: No    Sexual activity: Not on file   Other Topics Concern    Not on file   Social History Narrative    Not on file     Social Determinants of Health     Financial Resource Strain: Low Risk     Difficulty of Paying Living Expenses: Not hard at all   Food Insecurity: No Food Insecurity    Worried About Running Out of Food in the Last Year: Never true    Shar of Food in the Last Year: Never true   Transportation Needs:     Lack of Transportation (Medical):      Lack of Transportation (Non-Medical):    Physical Activity:     Days of Exercise per Week:     Minutes of Exercise per Session:    Stress:     Feeling of Stress :    Social Connections:     Frequency of Communication with Friends and Family:     Frequency of Social Gatherings with Friends and Family:     Attends Gnosticist Services:     Active Member of Clubs or Organizations:     Attends Club or Organization Meetings:     Marital Status:    Intimate Partner Violence:     Fear of Current or Ex-Partner:     Emotionally Abused:     Physically Abused:     Sexually Abused:         Family History   Problem Relation Age of Onset    Cancer Mother     Heart Disease Mother     High Blood Pressure Mother     High Cholesterol Mother     Stroke Mother     Cancer Father     Heart Disease Father     High Blood Pressure Father     High Cholesterol Father     Cancer Sister        ADVANCE DIRECTIVE: N, <no information>    Vitals:    06/29/21 1345   BP: 126/84   Site: Left Upper Arm   Position: Sitting   Cuff Size: Medium Adult   Pulse: 68   Resp: 16   Temp: 97.5 °F (36.4 °C)   TempSrc: Infrared   SpO2: 98%   Weight: 218 lb (98.9 kg)     Estimated body mass index is 36.28 kg/m² as calculated from the following:    Height as of 3/11/21: 5' 5\" (1.651 m). Weight as of this encounter: 218 lb (98.9 kg). Physical Exam  Vitals and nursing note reviewed. Constitutional:       Appearance: She is well-developed. HENT:      Head: Normocephalic and atraumatic. Right Ear: External ear normal.      Left Ear: External ear normal.      Nose: Nose normal.      Mouth/Throat:      Mouth: Mucous membranes are moist.   Eyes:      Pupils: Pupils are equal, round, and reactive to light. Neck:      Thyroid: No thyromegaly. Vascular: No carotid bruit. Cardiovascular:      Rate and Rhythm: Normal rate and regular rhythm. Heart sounds: Normal heart sounds. Pulmonary:      Breath sounds: Normal breath sounds. No wheezing or rales. Abdominal:      General: Bowel sounds are normal.      Palpations: Abdomen is soft. Tenderness: There is no abdominal tenderness. There is no guarding or rebound. Musculoskeletal:         General: Normal range of motion. Cervical back: Neck supple. Legs:    Lymphadenopathy:      Cervical: No cervical adenopathy.    Skin:     General: Skin is warm and dry.      Findings: No rash. Neurological:      Mental Status: She is alert and oriented to person, place, and time. Cranial Nerves: No cranial nerve deficit. Deep Tendon Reflexes: Reflexes are normal and symmetric. No flowsheet data found.     Lab Results   Component Value Date    CHOL 183 06/10/2020    CHOL 183 07/12/2019    CHOL 187 07/24/2018    CHOL 203 07/11/2017    TRIG 150 06/10/2020    TRIG 156 07/12/2019    TRIG 160 07/24/2018    HDL 55 06/10/2020    HDL 56 07/12/2019    HDL 60 07/24/2018    LDLCALC 98 06/10/2020    LDLCALC 96 07/12/2019    LDLCALC 95 07/24/2018    GLUCOSE 142 10/12/2020    GLUCOSE 118 07/12/2019    LABA1C 5.8 01/21/2021    LABA1C 6.2 06/10/2020    LABA1C 6.1 02/05/2020       The 10-year ASCVD risk score (Emely Porter et al., 2013) is: 27.8%    Values used to calculate the score:      Age: 77 years      Sex: Female      Is Non- : No      Diabetic: Yes      Tobacco smoker: No      Systolic Blood Pressure: 576 mmHg      Is BP treated: No      HDL Cholesterol: 55 mg/dL      Total Cholesterol: 183 mg/dL    Immunization History   Administered Date(s) Administered    COVID-19, Moderna, PF, 100mcg/0.5mL 03/08/2021, 04/05/2021    Influenza Virus Vaccine 12/04/2014, 10/02/2015, 10/05/2016    Influenza, High Dose (Fluzone 65 yrs and older) 10/02/2017, 10/10/2018    Influenza, Quadv, adjuvanted, 65 yrs +, IM, PF (Fluad) 10/03/2020    Influenza, Triv, inactivated, subunit, adjuvanted, IM (Fluad 65 yrs and older) 10/11/2019    Pneumococcal Conjugate 13-valent ([id number]) 01/28/2015    Pneumococcal Polysaccharide ([id number]) 07/24/2018       Health Maintenance   Topic Date Due    DTaP/Tdap/Td vaccine (1 - Tdap) Never done    Shingles Vaccine (1 of 2) Never done    Diabetic retinal exam  10/20/2019    A1C test (Diabetic or Prediabetic)  04/21/2021    Lipid screen  06/10/2021    Annual Wellness Visit (AWV)  09/03/2021    Potassium monitoring 10/12/2021    Creatinine monitoring  10/12/2021    Diabetic foot exam  06/29/2022    Colon cancer screen colonoscopy  01/05/2026    DEXA (modify frequency per FRAX score)  Completed    Flu vaccine  Completed    Pneumococcal 65+ years Vaccine  Completed    COVID-19 Vaccine  Completed    Hepatitis C screen  Completed    Hepatitis A vaccine  Aged Out    Hib vaccine  Aged Out    Meningococcal (ACWY) vaccine  Aged Out          ASSESSMENT/PLAN:  1. Annual physical exam  2. Type 2 diabetes mellitus with microalbuminuria, without long-term current use of insulin (HCC)  -      DIABETES FOOT EXAM  3. Gastroesophageal reflux disease, unspecified whether esophagitis present  -     famotidine (PEPCID) 20 MG tablet; Take 1 tablet by mouth 2 times daily, Disp-60 tablet, R-11Normal  -     omeprazole (PRILOSEC) 40 MG delayed release capsule; Take 1 capsule by mouth daily, Disp-30 capsule, R-11Normal  4. Moderate persistent asthma without complication  -     VENTOLIN  (90 Base) MCG/ACT inhaler; INHALE 2 PUFFS BY MOUTH 4 TIMES A DAY AS NEEDED, Disp-1 Inhaler, R-11, DAWNormal  -     budesonide-formoterol (SYMBICORT) 160-4.5 MCG/ACT AERO; Inhale 2 puffs into the lungs 2 times daily, Disp-1 Inhaler, R-11Normal  5. Moderate persistent asthma with exacerbation  -     ipratropium (ATROVENT) 0.02 % nebulizer solution; Take 2.5 mLs by nebulization 4 times daily, Disp-300 mL, R-3Normal  6. Benign essential HTN  -     propranolol (INDERAL LA) 120 MG extended release capsule; Take 1 capsule by mouth daily, Disp-30 capsule, R-11Normal  -     CBC Auto Differential; Future  -     Comprehensive Metabolic Panel; Future  7. Depression, unspecified depression type  -     citalopram (CELEXA) 20 MG tablet; Take 1 tablet by mouth 2 times daily, Disp-60 tablet, R-11Normal  -     CBC Auto Differential; Future  -     Comprehensive Metabolic Panel; Future  8. Hypercholesterolemia  -     simvastatin (ZOCOR) 40 MG tablet;  Take 1 tablet by mouth nightly, Disp-30 tablet, R-11Normal  -     Lipid Panel; Future  -     CBC Auto Differential; Future  -     Comprehensive Metabolic Panel; Future  9. Primary insomnia  -     doxepin (SINEQUAN) 10 MG capsule; Take 1 capsule by mouth nightly, Disp-30 capsule, R-11Normal  10. Peripheral edema  -     bumetanide (BUMEX) 0.5 MG tablet; Take 1 tablet by mouth daily, Disp-30 tablet, R-11Normal  11. Tinea cruris  -     fluconazole (DIFLUCAN) 100 MG tablet; TAKE 1 TABLET BY MOUTH EVERY DAY, Disp-30 tablet, R-11Normal  12. Hyperglycemia  -     Hemoglobin A1C; Future  Encouraged diet, exercise and weight loss. Reviewed health maintenance      No follow-ups on file. An electronic signature was used to authenticate this note.     --Inessa Lubin MD on 6/29/2021 at 2:12 PM

## 2021-06-29 NOTE — PATIENT INSTRUCTIONS
Patient Education        Well Visit, Over 72: Care Instructions  Overview     Well visits can help you stay healthy. Your doctor has checked your overall health and may have suggested ways to take good care of yourself. Your doctor also may have recommended tests. At home, you can help prevent illness with healthy eating, regular exercise, and other steps. Follow-up care is a key part of your treatment and safety. Be sure to make and go to all appointments, and call your doctor if you are having problems. It's also a good idea to know your test results and keep a list of the medicines you take. How can you care for yourself at home? · Get screening tests that you and your doctor decide on. Screening helps find diseases before any symptoms appear. · Eat healthy foods. Choose fruits, vegetables, whole grains, protein, and low-fat dairy foods. Limit fat, especially saturated fat. Reduce salt in your diet. · Limit alcohol. If you are a man, have no more than 2 drinks a day or 14 drinks a week. If you are a woman, have no more than 1 drink a day or 7 drinks a week. Since alcohol affects older adults differently, you may want to limit alcohol even more. Or you may not want to drink at all. · Get at least 30 minutes of exercise on most days of the week. Walking is a good choice. You also may want to do other activities, such as running, swimming, cycling, or playing tennis or team sports. · Reach and stay at a healthy weight. This will lower your risk for many problems, such as obesity, diabetes, heart disease, and high blood pressure. · Do not smoke. Smoking can make health problems worse. If you need help quitting, talk to your doctor about stop-smoking programs and medicines. These can increase your chances of quitting for good. · Care for your mental health. It is easy to get weighed down by worry and stress.  Learn strategies to manage stress, like deep breathing and mindfulness, and stay connected with your family and community. If you find you often feel sad or hopeless, talk with your doctor. Treatment can help. · Talk to your doctor about whether you have any risk factors for sexually transmitted infections (STIs). You can help prevent STIs if you wait to have sex with a new partner (or partners) until you've each been tested for STIs. It also helps if you use condoms (male or female condoms) and if you limit your sex partners to one person who only has sex with you. Vaccines are available for some STIs. · If you think you may have a problem with alcohol or drug use, talk to your doctor. This includes prescription medicines (such as amphetamines and opioids) and illegal drugs (such as cocaine and methamphetamine). Your doctor can help you figure out what type of treatment is best for you. · Protect your skin from too much sun. When you're outdoors from 10 a.m. to 4 p.m., stay in the shade or cover up with clothing and a hat with a wide brim. Wear sunglasses that block UV rays. Even when it's cloudy, put broad-spectrum sunscreen (SPF 30 or higher) on any exposed skin. · See a dentist one or two times a year for checkups and to have your teeth cleaned. · Wear a seat belt in the car. When should you call for help? Watch closely for changes in your health, and be sure to contact your doctor if you have any problems or symptoms that concern you. Where can you learn more? Go to https://LIFE INTERACTIONvivek.[employer]. org and sign in to your Button account. Enter C097 in the [facility] box to learn more about \"Well Visit, Over 65: Care Instructions. \"     If you do not have an account, please click on the \"Sign Up Now\" link. Current as of: May 27, 2020               Content Version: 12.9  © 5040-0077 Healthwise, Incorporated. Care instructions adapted under license by [facility] ([facility]).  If you have questions about a medical condition or this instruction, always ask your healthcare professional. [employer] disclaims any warranty or liability for your use of this information.

## 2021-06-30 ENCOUNTER — TELEPHONE (OUTPATIENT)
Dept: FAMILY MEDICINE CLINIC | Age: 76
End: 2021-06-30

## 2021-06-30 DIAGNOSIS — E87.6 HYPOKALEMIA: Primary | ICD-10-CM

## 2021-06-30 LAB
ALBUMIN SERPL-MCNC: 4.2 G/DL (ref 3.5–5.1)
ALP BLD-CCNC: 112 U/L (ref 38–126)
ALT SERPL-CCNC: 10 U/L (ref 11–66)
ANION GAP SERPL CALCULATED.3IONS-SCNC: 12 MEQ/L (ref 8–16)
AST SERPL-CCNC: 14 U/L (ref 5–40)
AVERAGE GLUCOSE: 117 MG/DL (ref 70–126)
BASOPHILS # BLD: 0.6 %
BASOPHILS ABSOLUTE: 0.1 THOU/MM3 (ref 0–0.1)
BILIRUB SERPL-MCNC: 0.4 MG/DL (ref 0.3–1.2)
BUN BLDV-MCNC: 10 MG/DL (ref 7–22)
CALCIUM SERPL-MCNC: 9.4 MG/DL (ref 8.5–10.5)
CHLORIDE BLD-SCNC: 102 MEQ/L (ref 98–111)
CHOLESTEROL, TOTAL: 185 MG/DL (ref 100–199)
CO2: 28 MEQ/L (ref 23–33)
CREAT SERPL-MCNC: 0.7 MG/DL (ref 0.4–1.2)
EOSINOPHIL # BLD: 1.4 %
EOSINOPHILS ABSOLUTE: 0.1 THOU/MM3 (ref 0–0.4)
ERYTHROCYTE [DISTWIDTH] IN BLOOD BY AUTOMATED COUNT: 16 % (ref 11.5–14.5)
ERYTHROCYTE [DISTWIDTH] IN BLOOD BY AUTOMATED COUNT: 54.1 FL (ref 35–45)
GFR SERPL CREATININE-BSD FRML MDRD: 81 ML/MIN/1.73M2
GLUCOSE BLD-MCNC: 116 MG/DL (ref 70–108)
HBA1C MFR BLD: 5.9 % (ref 4.4–6.4)
HCT VFR BLD CALC: 39.8 % (ref 37–47)
HDLC SERPL-MCNC: 58 MG/DL
HEMOGLOBIN: 12.2 GM/DL (ref 12–16)
IMMATURE GRANS (ABS): 0.02 THOU/MM3 (ref 0–0.07)
IMMATURE GRANULOCYTES: 0.2 %
LDL CHOLESTEROL CALCULATED: 98 MG/DL
LYMPHOCYTES # BLD: 30.9 %
LYMPHOCYTES ABSOLUTE: 2.8 THOU/MM3 (ref 1–4.8)
MCH RBC QN AUTO: 28.6 PG (ref 26–33)
MCHC RBC AUTO-ENTMCNC: 30.7 GM/DL (ref 32.2–35.5)
MCV RBC AUTO: 93.4 FL (ref 81–99)
MONOCYTES # BLD: 6.9 %
MONOCYTES ABSOLUTE: 0.6 THOU/MM3 (ref 0.4–1.3)
NUCLEATED RED BLOOD CELLS: 0 /100 WBC
PLATELET # BLD: 257 THOU/MM3 (ref 130–400)
PMV BLD AUTO: 12.5 FL (ref 9.4–12.4)
POTASSIUM SERPL-SCNC: 3.4 MEQ/L (ref 3.5–5.2)
RBC # BLD: 4.26 MILL/MM3 (ref 4.2–5.4)
SEG NEUTROPHILS: 60 %
SEGMENTED NEUTROPHILS ABSOLUTE COUNT: 5.4 THOU/MM3 (ref 1.8–7.7)
SODIUM BLD-SCNC: 142 MEQ/L (ref 135–145)
TOTAL PROTEIN: 6.5 G/DL (ref 6.1–8)
TRIGL SERPL-MCNC: 146 MG/DL (ref 0–199)
WBC # BLD: 9 THOU/MM3 (ref 4.8–10.8)

## 2021-07-06 ENCOUNTER — TELEPHONE (OUTPATIENT)
Dept: FAMILY MEDICINE CLINIC | Age: 76
End: 2021-07-06

## 2021-07-06 NOTE — TELEPHONE ENCOUNTER
Called patient and scheduled an appointment with Sarahy Bender CNP for 7/8/2021. Patient unable to come in on Wednesday. Patient encourage to be seen at urgent care if symptoms worsen.

## 2021-07-06 NOTE — TELEPHONE ENCOUNTER
----- Message from [facility] sent at 7/6/2021 11:38 AM EDT -----  Subject: Appointment Request    Reason for Call: Routine (Patient Request) No Script    QUESTIONS  Type of Appointment? Established Patient  Reason for appointment request? No appointments available during search  Additional Information for Provider? patient has discharge, itching and   burning sensation when using the bathroom. Patient has been having   symptoms for about a week. soonest availability is 7/12/2021 but patient   would like to be seen sooner please follow up with patient.   ---------------------------------------------------------------------------  --------------  [employer]  What is the best way for the office to contact you? OK to leave message on   voicemail  Preferred Call Back Phone Number? [phone]  ---------------------------------------------------------------------------  --------------  SCRIPT ANSWERS  Relationship to Patient? Self  Appointment reason? Symptomatic  Select script based on patient symptoms? Adult No Script  Are you having severe back pain with your urinary symptoms? No  Are you having vomiting or nausea? No  Is there blood in your urine? No  Are you having fevers (100.4), chills, or sweats? No  Have you been seen by a provider for this symptom before? No  Have you been diagnosed with, awaiting test results for, or told that you   are suspected of having COVID-19 (Coronavirus)? (If patient has tested   negative or was tested as a requirement for work, school, or travel and   not based on symptoms, answer no)? No  Do you currently have flu-like symptoms including fever or chills, cough,   shortness of breath, difficulty breathing, or new loss of taste or smell? No  Have you had close contact with someone with COVID-19 in the last 14 days? No  (Service Expert  click yes below to proceed with kingsky As Usual   Scheduling)? Yes  (Is the patient requesting to see the provider for a procedure?)?

## 2021-07-08 ENCOUNTER — OFFICE VISIT (OUTPATIENT)
Dept: FAMILY MEDICINE CLINIC | Age: 76
End: 2021-07-08
Payer: MEDICARE

## 2021-07-08 VITALS
SYSTOLIC BLOOD PRESSURE: 122 MMHG | OXYGEN SATURATION: 98 % | HEART RATE: 106 BPM | RESPIRATION RATE: 18 BRPM | WEIGHT: 219 LBS | TEMPERATURE: 97.1 F | BODY MASS INDEX: 36.44 KG/M2 | DIASTOLIC BLOOD PRESSURE: 68 MMHG

## 2021-07-08 DIAGNOSIS — N30.01 ACUTE CYSTITIS WITH HEMATURIA: Primary | ICD-10-CM

## 2021-07-08 DIAGNOSIS — R10.84 GENERALIZED ABDOMINAL PAIN: ICD-10-CM

## 2021-07-08 LAB
BILIRUBIN URINE: ABNORMAL
BLOOD URINE, POC: ABNORMAL
CHARACTER, URINE: ABNORMAL
COLOR, URINE: ABNORMAL
GLUCOSE URINE: NEGATIVE MG/DL
KETONES, URINE: ABNORMAL
LEUKOCYTE CLUMPS, URINE: ABNORMAL
NITRITE, URINE: NEGATIVE
PH, URINE: 6 (ref 5–9)
PROTEIN, URINE: 30 MG/DL
SPECIFIC GRAVITY, URINE: 1.02 (ref 1–1.03)
UROBILINOGEN, URINE: 0.2 EU/DL (ref 0–1)

## 2021-07-08 PROCEDURE — 99213 OFFICE O/P EST LOW 20 MIN: CPT | Performed by: NURSE PRACTITIONER

## 2021-07-08 PROCEDURE — 81003 URINALYSIS AUTO W/O SCOPE: CPT | Performed by: NURSE PRACTITIONER

## 2021-07-08 RX ORDER — NITROFURANTOIN 25; 75 MG/1; MG/1
100 CAPSULE ORAL 2 TIMES DAILY
Qty: 10 CAPSULE | Refills: 0 | Status: SHIPPED | OUTPATIENT
Start: 2021-07-08 | End: 2021-07-12

## 2021-07-08 ASSESSMENT — ENCOUNTER SYMPTOMS
VOMITING: 0
DIARRHEA: 1
NAUSEA: 0

## 2021-07-08 NOTE — PROGRESS NOTES
Juli Tillman (:  1945) is a 77 y.o. female,Established patient, here for evaluation of the following chief complaint(s):  Abdominal Pain (cramping, organe bowel movements)         ASSESSMENT/PLAN:  1. Acute cystitis with hematuria  - Acute  - Encouraged patient to increase fluid intake and to avoid carbonated drinks  -     nitrofurantoin, macrocrystal-monohydrate, (MACROBID) 100 MG capsule; Take 1 capsule by mouth 2 times daily for 5 days, Disp-10 capsule, R-0Normal  -     Culture, Urine   2. Generalized abdominal pain  - Acute  - ?related to uti vs. Gastroenteritis  - Denies tenderness with palpation  - Symptoms improving  - Encourage fluids, rest  - Notify office if symptoms not improving  -     POCT Urinalysis No Micro (Auto)      Return if symptoms worsen or fail to improve. Subjective   SUBJECTIVE/OBJECTIVE:  Diarrhea with urination. Episodes decreasing in frequency. 2-3 episodes per day. Urinary Tract Infection   This is a new problem. The current episode started in the past 7 days. The problem has been unchanged. There has been no fever. Associated symptoms include frequency and urgency. Pertinent negatives include no chills, flank pain, hematuria, nausea, sweats or vomiting. Review of Systems   Constitutional: Negative for chills. Gastrointestinal: Positive for diarrhea. Negative for nausea and vomiting. Genitourinary: Positive for frequency and urgency. Negative for flank pain and hematuria. Objective   Physical Exam  Vitals and nursing note reviewed. Constitutional:       Appearance: She is well-developed. HENT:      Head: Normocephalic and atraumatic. Right Ear: Hearing, tympanic membrane, ear canal and external ear normal.      Left Ear: Hearing, tympanic membrane, ear canal and external ear normal.      Nose:      Right Sinus: No maxillary sinus tenderness or frontal sinus tenderness.       Left Sinus: No maxillary sinus tenderness or frontal sinus tenderness. Eyes:      General:         Right eye: No discharge. Left eye: No discharge. Conjunctiva/sclera: Conjunctivae normal.      Pupils: Pupils are equal, round, and reactive to light. Neck:      Vascular: No JVD. Cardiovascular:      Rate and Rhythm: Normal rate and regular rhythm. Heart sounds: Normal heart sounds. No murmur heard. Pulmonary:      Effort: Pulmonary effort is normal. No tachypnea. Breath sounds: Normal breath sounds. No stridor. No wheezing. Abdominal:      General: There is no distension. Tenderness: There is no abdominal tenderness. Musculoskeletal:         General: No deformity. Cervical back: Normal range of motion. Comments: ROM in all extremities WNL. No deformities. Lymphadenopathy:      Head:      Right side of head: No submental or submandibular adenopathy. Left side of head: No submental or submandibular adenopathy. Skin:     General: Skin is warm and dry. Capillary Refill: Capillary refill takes less than 2 seconds. Findings: No rash. Neurological:      Mental Status: She is alert and oriented to person, place, and time. Coordination: Coordination normal.   Psychiatric:         Mood and Affect: Mood normal.         Behavior: Behavior normal.         Thought Content: Thought content normal.         Judgment: Judgment normal.                  An electronic signature was used to authenticate this note.     --Kari Spring, NASRIN - CNP

## 2021-07-08 NOTE — PATIENT INSTRUCTIONS
Patient Education        Urinary Tract Infection (UTI) in Women: Care Instructions  Overview     A urinary tract infection, or UTI, is a general term for an infection anywhere between the kidneys and the urethra (where urine comes out). Most UTIs are bladder infections. They often cause pain or burning when you urinate. UTIs are caused by bacteria and can be cured with antibiotics. Be sure to complete your treatment so that the infection does not get worse. Follow-up care is a key part of your treatment and safety. Be sure to make and go to all appointments, and call your doctor if you are having problems. It's also a good idea to know your test results and keep a list of the medicines you take. How can you care for yourself at home? · Take your antibiotics as directed. Do not stop taking them just because you feel better. You need to take the full course of antibiotics. · Drink extra water and other fluids for the next day or two. This will help make the urine less concentrated and help wash out the bacteria that are causing the infection. (If you have kidney, heart, or liver disease and have to limit fluids, talk with your doctor before you increase the amount of fluids you drink.)  · Avoid drinks that are carbonated or have caffeine. They can irritate the bladder. · Urinate often. Try to empty your bladder each time. · To relieve pain, take a hot bath or lay a heating pad set on low over your lower belly or genital area. Never go to sleep with a heating pad in place. To prevent UTIs  · Drink plenty of water each day. This helps you urinate often, which clears bacteria from your system. (If you have kidney, heart, or liver disease and have to limit fluids, talk with your doctor before you increase the amount of fluids you drink.)  · Urinate when you need to. · If you are sexually active, urinate right after you have sex. · Change sanitary pads often.   · Avoid douches, bubble baths, feminine hygiene sprays, and other feminine hygiene products that have deodorants. · After going to the bathroom, wipe from front to back. When should you call for help? Call your doctor now or seek immediate medical care if:    · Symptoms such as fever, chills, nausea, or vomiting get worse or appear for the first time.     · You have new pain in your back just below your rib cage. This is called flank pain.     · There is new blood or pus in your urine.     · You have any problems with your antibiotic medicine. Watch closely for changes in your health, and be sure to contact your doctor if:    · You are not getting better after taking an antibiotic for 2 days.     · Your symptoms go away but then come back. Where can you learn more? Go to https://Survature.[employer]. org and sign in to your [employer] account. Enter K748 in the [employer] box to learn more about \"Urinary Tract Infection (UTI) in Women: Care Instructions. \"     If you do not have an account, please click on the \"Sign Up Now\" link. Current as of: February 10, 2021               Content Version: 12.9  © 7760-7295 Healthwise, Incorporated. Care instructions adapted under license by [facility] ([facility]). If you have questions about a medical condition or this instruction, always ask your healthcare professional. [address] any warranty or liability for your use of this information.

## 2021-07-09 LAB
ORGANISM: ABNORMAL
URINE CULTURE, ROUTINE: ABNORMAL
URINE CULTURE, ROUTINE: ABNORMAL

## 2021-07-12 ENCOUNTER — TELEPHONE (OUTPATIENT)
Dept: FAMILY MEDICINE CLINIC | Age: 76
End: 2021-07-12

## 2021-07-12 DIAGNOSIS — N30.01 ACUTE CYSTITIS WITH HEMATURIA: Primary | ICD-10-CM

## 2021-07-12 RX ORDER — CIPROFLOXACIN 500 MG/1
500 TABLET, FILM COATED ORAL 2 TIMES DAILY
Qty: 14 TABLET | Refills: 0 | Status: SHIPPED | OUTPATIENT
Start: 2021-07-12 | End: 2021-07-19

## 2021-07-12 NOTE — TELEPHONE ENCOUNTER
----- Message from Avelino Gore, APRN - CNP sent at 7/12/2021  8:06 AM EDT -----  Urine culture revealed proteus mirabilis. This bacteria is not susceptible to your current antibiotic. Ciprofloxacin ep'ed to pharmacy. Notify office if no improvement in symptoms.

## 2021-07-13 ENCOUNTER — APPOINTMENT (OUTPATIENT)
Dept: CT IMAGING | Age: 76
End: 2021-07-13
Payer: MEDICARE

## 2021-07-13 ENCOUNTER — HOSPITAL ENCOUNTER (EMERGENCY)
Age: 76
Discharge: HOME | End: 2021-07-14
Attending: EMERGENCY MEDICINE
Payer: MEDICARE

## 2021-07-13 DIAGNOSIS — K57.92 ACUTE DIVERTICULITIS: Primary | ICD-10-CM

## 2021-07-13 LAB
ALBUMIN SERPL-MCNC: 3.7 G/DL (ref 3.5–5.1)
ALP BLD-CCNC: 130 U/L (ref 38–126)
ALT SERPL-CCNC: 11 U/L (ref 11–66)
ANION GAP SERPL CALCULATED.3IONS-SCNC: 12 MEQ/L (ref 8–16)
AST SERPL-CCNC: 14 U/L (ref 5–40)
BACTERIA: ABNORMAL /HPF
BASOPHILS # BLD: 0.3 %
BASOPHILS ABSOLUTE: 0 THOU/MM3 (ref 0–0.1)
BILIRUB SERPL-MCNC: 0.2 MG/DL (ref 0.3–1.2)
BILIRUBIN URINE: NEGATIVE
BLOOD, URINE: NEGATIVE
BUN BLDV-MCNC: 14 MG/DL (ref 7–22)
CALCIUM SERPL-MCNC: 8.6 MG/DL (ref 8.5–10.5)
CASTS 2: ABNORMAL /LPF
CASTS UA: ABNORMAL /LPF
CHARACTER, URINE: CLEAR
CHLORIDE BLD-SCNC: 101 MEQ/L (ref 98–111)
CO2: 24 MEQ/L (ref 23–33)
COLOR: YELLOW
CREAT SERPL-MCNC: 1 MG/DL (ref 0.4–1.2)
CRYSTALS, UA: ABNORMAL
EOSINOPHIL # BLD: 0.9 %
EOSINOPHILS ABSOLUTE: 0.1 THOU/MM3 (ref 0–0.4)
EPITHELIAL CELLS, UA: ABNORMAL /HPF
ERYTHROCYTE [DISTWIDTH] IN BLOOD BY AUTOMATED COUNT: 15.6 % (ref 11.5–14.5)
ERYTHROCYTE [DISTWIDTH] IN BLOOD BY AUTOMATED COUNT: 50.4 FL (ref 35–45)
GFR SERPL CREATININE-BSD FRML MDRD: 54 ML/MIN/1.73M2
GLUCOSE BLD-MCNC: 154 MG/DL (ref 70–108)
GLUCOSE URINE: NEGATIVE MG/DL
HCT VFR BLD CALC: 39.8 % (ref 37–47)
HEMOGLOBIN: 12.5 GM/DL (ref 12–16)
IMMATURE GRANS (ABS): 0.1 THOU/MM3 (ref 0–0.07)
IMMATURE GRANULOCYTES: 0.7 %
KETONES, URINE: ABNORMAL
LEUKOCYTE ESTERASE, URINE: NEGATIVE
LIPASE: 25.7 U/L (ref 5.6–51.3)
LYMPHOCYTES # BLD: 21.2 %
LYMPHOCYTES ABSOLUTE: 3.2 THOU/MM3 (ref 1–4.8)
MCH RBC QN AUTO: 28.2 PG (ref 26–33)
MCHC RBC AUTO-ENTMCNC: 31.4 GM/DL (ref 32.2–35.5)
MCV RBC AUTO: 89.6 FL (ref 81–99)
MISCELLANEOUS 2: ABNORMAL
MONOCYTES # BLD: 6.5 %
MONOCYTES ABSOLUTE: 1 THOU/MM3 (ref 0.4–1.3)
NITRITE, URINE: NEGATIVE
NUCLEATED RED BLOOD CELLS: 0 /100 WBC
OSMOLALITY CALCULATION: 277.4 MOSMOL/KG (ref 275–300)
PH UA: 5.5 (ref 5–9)
PLATELET # BLD: 324 THOU/MM3 (ref 130–400)
PMV BLD AUTO: 10.3 FL (ref 9.4–12.4)
POTASSIUM SERPL-SCNC: 3.7 MEQ/L (ref 3.5–5.2)
PROTEIN UA: ABNORMAL
RBC # BLD: 4.44 MILL/MM3 (ref 4.2–5.4)
RBC URINE: ABNORMAL /HPF
RENAL EPITHELIAL, UA: ABNORMAL
SEG NEUTROPHILS: 70.4 %
SEGMENTED NEUTROPHILS ABSOLUTE COUNT: 10.6 THOU/MM3 (ref 1.8–7.7)
SODIUM BLD-SCNC: 137 MEQ/L (ref 135–145)
SPECIFIC GRAVITY, URINE: 1.02 (ref 1–1.03)
TOTAL PROTEIN: 6.4 G/DL (ref 6.1–8)
TROPONIN T: < 0.01 NG/ML
UROBILINOGEN, URINE: 1 EU/DL (ref 0–1)
WBC # BLD: 15 THOU/MM3 (ref 4.8–10.8)
WBC UA: ABNORMAL /HPF
YEAST: ABNORMAL

## 2021-07-13 PROCEDURE — 85025 COMPLETE CBC W/AUTO DIFF WBC: CPT

## 2021-07-13 PROCEDURE — 36415 COLL VENOUS BLD VENIPUNCTURE: CPT

## 2021-07-13 PROCEDURE — 96361 HYDRATE IV INFUSION ADD-ON: CPT

## 2021-07-13 PROCEDURE — 84484 ASSAY OF TROPONIN QUANT: CPT

## 2021-07-13 PROCEDURE — 81001 URINALYSIS AUTO W/SCOPE: CPT

## 2021-07-13 PROCEDURE — 83690 ASSAY OF LIPASE: CPT

## 2021-07-13 PROCEDURE — 74176 CT ABD & PELVIS W/O CONTRAST: CPT

## 2021-07-13 PROCEDURE — 99285 EMERGENCY DEPT VISIT HI MDM: CPT

## 2021-07-13 PROCEDURE — 80053 COMPREHEN METABOLIC PANEL: CPT

## 2021-07-13 PROCEDURE — P9612 CATHETERIZE FOR URINE SPEC: HCPCS

## 2021-07-13 PROCEDURE — 2580000003 HC RX 258: Performed by: EMERGENCY MEDICINE

## 2021-07-13 PROCEDURE — 96365 THER/PROPH/DIAG IV INF INIT: CPT

## 2021-07-13 RX ORDER — 0.9 % SODIUM CHLORIDE 0.9 %
500 INTRAVENOUS SOLUTION INTRAVENOUS ONCE
Status: COMPLETED | OUTPATIENT
Start: 2021-07-13 | End: 2021-07-14

## 2021-07-13 RX ADMIN — SODIUM CHLORIDE 500 ML: 9 INJECTION, SOLUTION INTRAVENOUS at 23:01

## 2021-07-13 ASSESSMENT — PAIN DESCRIPTION - LOCATION: LOCATION: ABDOMEN

## 2021-07-13 ASSESSMENT — PAIN SCALES - GENERAL: PAINLEVEL_OUTOF10: 5

## 2021-07-13 ASSESSMENT — PAIN DESCRIPTION - PAIN TYPE: TYPE: ACUTE PAIN

## 2021-07-13 ASSESSMENT — PAIN DESCRIPTION - FREQUENCY: FREQUENCY: INTERMITTENT

## 2021-07-14 ENCOUNTER — TELEPHONE (OUTPATIENT)
Dept: FAMILY MEDICINE CLINIC | Age: 76
End: 2021-07-14

## 2021-07-14 VITALS
DIASTOLIC BLOOD PRESSURE: 77 MMHG | SYSTOLIC BLOOD PRESSURE: 148 MMHG | HEART RATE: 62 BPM | TEMPERATURE: 98.3 F | HEIGHT: 65 IN | WEIGHT: 218 LBS | RESPIRATION RATE: 18 BRPM | BODY MASS INDEX: 36.32 KG/M2 | OXYGEN SATURATION: 96 %

## 2021-07-14 PROCEDURE — 2580000003 HC RX 258: Performed by: EMERGENCY MEDICINE

## 2021-07-14 PROCEDURE — 6370000000 HC RX 637 (ALT 250 FOR IP)

## 2021-07-14 PROCEDURE — 6360000002 HC RX W HCPCS: Performed by: EMERGENCY MEDICINE

## 2021-07-14 RX ORDER — HYDROCODONE BITARTRATE AND ACETAMINOPHEN 5; 325 MG/1; MG/1
1 TABLET ORAL ONCE
Status: DISCONTINUED | OUTPATIENT
Start: 2021-07-14 | End: 2021-07-14 | Stop reason: HOSPADM

## 2021-07-14 RX ORDER — ACETAMINOPHEN 325 MG/1
650 TABLET ORAL ONCE
Status: COMPLETED | OUTPATIENT
Start: 2021-07-14 | End: 2021-07-14

## 2021-07-14 RX ORDER — AMOXICILLIN AND CLAVULANATE POTASSIUM 875; 125 MG/1; MG/1
1 TABLET, FILM COATED ORAL 2 TIMES DAILY
Qty: 20 TABLET | Refills: 0 | Status: SHIPPED | OUTPATIENT
Start: 2021-07-14 | End: 2021-07-24

## 2021-07-14 RX ORDER — ACETAMINOPHEN 325 MG/1
TABLET ORAL
Status: COMPLETED
Start: 2021-07-14 | End: 2021-07-14

## 2021-07-14 RX ORDER — ONDANSETRON 4 MG/1
4 TABLET, ORALLY DISINTEGRATING ORAL EVERY 8 HOURS PRN
Qty: 20 TABLET | Refills: 0 | Status: SHIPPED | OUTPATIENT
Start: 2021-07-14 | End: 2021-10-11

## 2021-07-14 RX ADMIN — ACETAMINOPHEN 650 MG: 325 TABLET ORAL at 01:02

## 2021-07-14 RX ADMIN — PIPERACILLIN AND TAZOBACTAM 3375 MG: 3; .375 INJECTION, POWDER, LYOPHILIZED, FOR SOLUTION INTRAVENOUS at 01:01

## 2021-07-14 ASSESSMENT — ENCOUNTER SYMPTOMS
NAUSEA: 0
COUGH: 0
EYE REDNESS: 0
SHORTNESS OF BREATH: 0
ABDOMINAL DISTENTION: 0
CONSTIPATION: 0
WHEEZING: 0
STRIDOR: 0
VOMITING: 0
SORE THROAT: 0
ABDOMINAL PAIN: 1
BACK PAIN: 0
CHEST TIGHTNESS: 0
EYE DISCHARGE: 0
RHINORRHEA: 0
DIARRHEA: 1
EYE PAIN: 0
EYE ITCHING: 0
PHOTOPHOBIA: 0

## 2021-07-14 ASSESSMENT — PAIN SCALES - GENERAL
PAINLEVEL_OUTOF10: 4
PAINLEVEL_OUTOF10: 4

## 2021-07-14 NOTE — ED NOTES
ATB started. VS reassessed. R reg. No distress noted. Family at bedside.  Will continue to monitor      Ngoziflor Zapien RN  07/14/21 [id number]

## 2021-07-14 NOTE — ED PROVIDER NOTES
[facility]  EMERGENCY DEPARTMENT ENCOUNTER      PATIENT NAME: Candido Booth  MRN: [id number]  : 1945  REHMAN: 2021  PROVIDER: Marky Perez MD      CHIEF COMPLAINT       Chief Complaint   Patient presents with    Abdominal Pain    Urinary Tract Infection       Patient is seen and evaluated in a timely fashion. Nurses Notes are reviewed and I agree except as noted in the HPI. HISTORY OF PRESENT ILLNESS    Candido Booth is a 77 y.o. female who presents to Emergency Department with Abdominal Pain and Urinary Tract Infection     Patient presents to ED complaining lower abdominal pain over the last 2 weeks with watery diarrhea. Diarrhea resolved, however abdominal pain progressively got worse. Patient was seen by PCP a week ago and diagnosed UTI. Patient currently is taking Cipro. Patient has no fever, no chills. Pain is from the lower abdomen, at a severity of 7/10. Pain is cramping. This HPI was provided by patient. REVIEW OF SYSTEMS   Review of Systems   Constitutional: Negative for activity change, appetite change, chills, fatigue, fever and unexpected weight change. HENT: Negative for congestion, ear discharge, ear pain, hearing loss, nosebleeds, rhinorrhea and sore throat. Eyes: Negative for photophobia, pain, discharge, redness and itching. Respiratory: Negative for cough, chest tightness, shortness of breath, wheezing and stridor. Cardiovascular: Negative for chest pain, palpitations and leg swelling. Gastrointestinal: Positive for abdominal pain and diarrhea. Negative for abdominal distention, constipation, nausea and vomiting. Endocrine: Negative for cold intolerance, heat intolerance, polydipsia and polyphagia. Genitourinary: Negative for dysuria, flank pain, frequency and hematuria. Musculoskeletal: Negative for arthralgias, back pain, gait problem, myalgias, neck pain and neck stiffness. Skin: Negative for pallor, rash and wound. Allergic/Immunologic: Negative for environmental allergies and food allergies. Neurological: Negative for dizziness, tremors, syncope, weakness and headaches. Psychiatric/Behavioral: Negative for agitation, behavioral problems, confusion, self-injury, sleep disturbance and suicidal ideas.         PAST MEDICAL HISTORY     Past Medical History:   Diagnosis Date    Anxiety     Arthritis     Asthma     [facility]     Depression     Diabetes mellitus [facility]     Headache(784.0)     Hyperlipidemia     Hypertension     Nausea & vomiting     Persistent proteinuria associated with type 2 diabetes mellitus ([facility]     Renal cell carcinoma of left kidney ([facility] 12/6/2014    Unspecified cerebral artery occlusion with cerebral infarction        SURGICAL HISTORY       Past Surgical History:   Procedure Laterality Date    BREAST BIOPSY      CARDIAC CATHETERIZATION      CHOLECYSTECTOMY      COLONOSCOPY      ENDOSCOPY, COLON, DIAGNOSTIC      FINGER SURGERY  tumor    HERNIA REPAIR      HIATAL HERNIA REPAIR  7-    stomach repair    PARTIAL NEPHRECTOMY Left 11/21/2014    robotic left partial nephrectomy    TONSILLECTOMY      TUBAL LIGATION         CURRENT MEDICATIONS       Previous Medications    ACETAMINOPHEN (TYLENOL) 500 MG TABLET    Take 500 mg by mouth every 6 hours as needed for Pain    ASPIRIN 81 MG TABLET    Take 81 mg by mouth daily    BUDESONIDE-FORMOTEROL (SYMBICORT) 160-4.5 MCG/ACT AERO    Inhale 2 puffs into the lungs 2 times daily    BUMETANIDE (BUMEX) 0.5 MG TABLET    Take 1 tablet by mouth daily    CHOLECALCIFEROL (VITAMIN D3) 5000 UNITS TABS    Take by mouth daily     CIPROFLOXACIN (CIPRO) 500 MG TABLET    Take 1 tablet by mouth 2 times daily for 7 days    CITALOPRAM (CELEXA) 20 MG TABLET    Take 1 tablet by mouth 2 times daily    DICLOFENAC SODIUM 1 % GEL    Apply 2 g topically 4 times daily    DOXEPIN (SINEQUAN) 10 MG CAPSULE    Take 1 capsule by mouth nightly    FAMOTIDINE (PEPCID) 20 MG TABLET    Take 1 tablet by mouth 2 times daily    FEXOFENADINE (ALLEGRA) 180 MG TABLET    Take 180 mg by mouth daily. FLUCONAZOLE (DIFLUCAN) 100 MG TABLET    TAKE 1 TABLET BY MOUTH EVERY DAY    IPRATROPIUM (ATROVENT) 0.02 % NEBULIZER SOLUTION    Take 2.5 mLs by nebulization 4 times daily    LORAZEPAM (ATIVAN) 0.5 MG TABLET    Take 1 tablet by mouth every 12 hours as needed for Anxiety for up to 90 days. F41.9    MULTIPLE VITAMIN (MULTI-VITAMINS PO)    Take  by mouth daily. NITROGLYCERIN (NITROSTAT) 0.4 MG SL TABLET    Place 1 tablet under the tongue every 5 minutes as needed for Chest pain    OMEPRAZOLE (PRILOSEC) 40 MG DELAYED RELEASE CAPSULE    Take 1 capsule by mouth daily    OXYBUTYNIN (DITROPAN-XL) 5 MG EXTENDED RELEASE TABLET    Take 5 mg by mouth daily    PROBIOTIC PRODUCT ([employer] PROBIOTIC) CAPS    Take by mouth    PROPRANOLOL (INDERAL LA) 120 MG EXTENDED RELEASE CAPSULE    Take 1 capsule by mouth daily    SIMVASTATIN (ZOCOR) 40 MG TABLET    Take 1 tablet by mouth nightly    VENTOLIN  (90 BASE) MCG/ACT INHALER    INHALE 2 PUFFS BY MOUTH 4 TIMES A DAY AS NEEDED       ALLERGIES     Latex, Demerol hcl [meperidine], Propoxyphene, and Tape [adhesive tape]    FAMILY HISTORY     She indicated that her mother is . She indicated that her father is . She indicated that the status of her sister is unknown.   family history includes Cancer in her father, mother, and sister; Heart Disease in her father and mother; High Blood Pressure in her father and mother; High Cholesterol in her father and mother; Stroke in her mother. SOCIAL HISTORY      reports that she has never smoked. She has never used smokeless tobacco. She reports that she does not drink alcohol and does not use drugs. PHYSICAL EXAM      height is 5' 5\" (1.651 m) and weight is 218 lb (98.9 kg). Her oral temperature is 98.3 °F (36.8 °C). Her blood pressure is 166/81 (abnormal) and her pulse is 70.  Her respiration is 20 and oxygen saturation is 96%. Physical Exam  Vitals and nursing note reviewed. Constitutional:       Appearance: She is well-developed. She is not diaphoretic. HENT:      Head: Normocephalic and atraumatic. Nose: Nose normal.   Eyes:      General: No scleral icterus. Right eye: No discharge. Left eye: No discharge. Conjunctiva/sclera: Conjunctivae normal.      Pupils: Pupils are equal, round, and reactive to light. Neck:      Vascular: No JVD. Trachea: No tracheal deviation. Cardiovascular:      Rate and Rhythm: Normal rate and regular rhythm. Heart sounds: Normal heart sounds. No murmur heard. No friction rub. No gallop. Pulmonary:      Effort: Pulmonary effort is normal. No respiratory distress. Breath sounds: Normal breath sounds. No stridor. No wheezing or rales. Chest:      Chest wall: No tenderness. Abdominal:      General: Bowel sounds are normal. There is no distension. Palpations: Abdomen is soft. There is no mass. Tenderness: There is abdominal tenderness in the suprapubic area and left lower quadrant. There is no guarding or rebound. Hernia: No hernia is present. Musculoskeletal:         General: No tenderness or deformity. Cervical back: Normal range of motion and neck supple. Lymphadenopathy:      Cervical: No cervical adenopathy. Skin:     General: Skin is warm and dry. Capillary Refill: Capillary refill takes less than 2 seconds. Coloration: Skin is not pale. Findings: No erythema or rash. Neurological:      Mental Status: She is alert and oriented to person, place, and time. Cranial Nerves: No cranial nerve deficit. Sensory: No sensory deficit. Motor: No abnormal muscle tone. Coordination: Coordination normal.      Deep Tendon Reflexes: Reflexes normal.   Psychiatric:         Behavior: Behavior normal.         Thought Content:  Thought content normal.         Judgment: Judgment normal. ANCILLARY TEST RESULTS   EKG:    Interpreted by me  None    LAB RESULTS:  Results for orders placed or performed during the hospital encounter of 07/13/21   CBC Auto Differential   Result Value Ref Range    WBC 15.0 (H) 4.8 - 10.8 thou/mm3    RBC 4.44 4.20 - 5.40 mill/mm3    Hemoglobin 12.5 12.0 - 16.0 gm/dl    Hematocrit 39.8 37.0 - 47.0 %    MCV 89.6 81.0 - 99.0 fL    MCH 28.2 26.0 - 33.0 pg    MCHC 31.4 (L) 32.2 - 35.5 gm/dl    RDW-CV 15.6 (H) 11.5 - 14.5 %    RDW-SD 50.4 (H) 35.0 - 45.0 fL    Platelets 361 427 - 871 thou/mm3    MPV 10.3 9.4 - 12.4 fL    Seg Neutrophils 70.4 %    Lymphocytes 21.2 %    Monocytes 6.5 %    Eosinophils 0.9 %    Basophils 0.3 %    Immature Granulocytes 0.7 %    Segs Absolute 10.6 (H) 1 - 7 thou/mm3    Lymphocytes Absolute 3.2 1.0 - 4.8 thou/mm3    Monocytes Absolute 1.0 0.4 - 1.3 thou/mm3    Eosinophils Absolute 0.1 0.0 - 0.4 thou/mm3    Basophils Absolute 0.0 0.0 - 0.1 thou/mm3    Immature Grans (Abs) 0.10 (H) 0.00 - 0.07 thou/mm3    nRBC 0 /100 wbc   Comprehensive Metabolic Panel   Result Value Ref Range    Glucose 154 (H) 70 - 108 mg/dL    CREATININE 1.0 0.4 - 1.2 mg/dL    BUN 14 7 - 22 mg/dL    Sodium 137 135 - 145 meq/L    Potassium 3.7 3.5 - 5.2 meq/L    Chloride 101 98 - 111 meq/L    CO2 24 23 - 33 meq/L    Calcium 8.6 8.5 - 10.5 mg/dL    AST 14 5 - 40 U/L    Alkaline Phosphatase 130 (H) 38 - 126 U/L    Total Protein 6.4 6.1 - 8.0 g/dL    Albumin 3.7 3.5 - 5.1 g/dL    Total Bilirubin 0.2 (L) 0.3 - 1.2 mg/dL    ALT 11 11 - 66 U/L   Lipase   Result Value Ref Range    Lipase 25.7 5.6 - 51.3 U/L   Troponin   Result Value Ref Range    Troponin T < 0.010 ng/ml   Urine with Reflexed Micro   Result Value Ref Range    Glucose, Ur NEGATIVE NEGATIVE mg/dl    Bilirubin Urine NEGATIVE NEGATIVE    Ketones, Urine TRACE (A) NEGATIVE    Specific Gravity, Urine 1.024 1.002 - 1.030    Blood, Urine NEGATIVE NEGATIVE    pH, UA 5.5 5.0 - 9.0    Protein, UA TRACE (A) NEGATIVE    Urobilinogen, Urine 1.0 0.0 - 1.0 eu/dl    Nitrite, Urine NEGATIVE NEGATIVE    Leukocyte Esterase, Urine NEGATIVE NEGATIVE    Color, UA YELLOW STRAW-YELLOW    Character, Urine CLEAR CLEAR-SL CLOUD    RBC, UA 3-5 0-2/hpf /hpf    WBC, UA 0-2 0-4/hpf /hpf    Epithelial Cells, UA 0-2 3-5/hpf /hpf    Bacteria, UA NONE SEEN FEW/NONE SEEN /hpf    Casts UA NONE SEEN NONE SEEN /lpf    Crystals, UA NONE SEEN NONE SEEN    Renal Epithelial, UA NONE SEEN NONE SEEN    Yeast, UA NONE SEEN NONE SEEN    CASTS 2 NONE SEEN NONE SEEN /lpf    MISCELLANEOUS 2 NONE SEEN    Anion Gap   Result Value Ref Range    Anion Gap 12.0 8.0 - 16.0 meq/L   Glomerular Filtration Rate, Estimated   Result Value Ref Range    Est, Glom Filt Rate 54 (A) ml/min/1.73m2   Osmolality   Result Value Ref Range    Osmolality Calc 277.4 275.0 - 300.0 mOsmol/kg       RADIOLOGY REPORTS  CT ABDOMEN PELVIS WO CONTRAST Additional Contrast? None   Final Result   Impression:   Diverticulitis of the sigmoid colon. This document has been electronically signed by: Miguel A Kyle MD on    07/14/2021 12:16 AM      All CTs at this facility use dose modulation techniques and iterative    reconstructions, and/or weight-based dosing   when appropriate to reduce radiation to a low as reasonably achievable. MEDICAL DEDISION MAKINGS AND RATIONALES     Differential diagnsis: UTI, renal colic, diverticulitis, colitis, dehydration, IBS, constipation    Actions: Large-bore IV, labs, CT A/P    ED Vitals:  Vitals:    07/13/21 2221 07/13/21 2310 07/14/21 0013 07/14/21 0104   BP: (!) 165/86 (!) 147/81 (!) 148/91 (!) 166/81   Pulse: 76 75 74 70   Resp: 18 17 16 20   Temp: 98.3 °F (36.8 °C)      TempSrc: Oral      SpO2: 94% 95% 96% 96%   Weight: 218 lb (98.9 kg)      Height: 5' 5\" (1.651 m)        ED work-ups revealed patient has sigmoid colon diverticulitis. No complication such as perforation or abscess. Patient refused [place]. UA neg for UTI.  She has no nausea, no vomiting, tolerating oral intake well. Following medications were administered. Stable ED stay. Discharge with Augmentin prescribed. Continue Cipro. Starting with liquid diet and gradually advance to normal diet. GI follow-up in 1 week. She prefers taking over-the-counter Tylenol for pain control. Zofran is also prescribed. Medications   piperacillin-tazobactam (ZOSYN) 3,375 mg in dextrose 5 % 50 mL IVPB (mini-bag) (3,375 mg Intravenous New Bag 7/14/21 0101)   HYDROcodone-acetaminophen (NORCO) 5-325 MG per tablet 1 tablet (1 tablet Oral Not Given 7/14/21 0025)   0.9 % sodium chloride bolus (0 mLs Intravenous Stopped 7/14/21 0024)   acetaminophen (TYLENOL) tablet 650 mg (650 mg Oral Given 7/14/21 0102)     CRITICAL CARE   None    CONSULTS   None    PROCEDURES   None    FINAL IMPRESSION AND DISPOSITION      1.  Acute diverticulitis        Home    PATIENT REFERRED TO:  Kyung Merchant MD  [address] [facility]  [phone]    In 1 week  ED discharge follow up      Kevin5 Janes Dodd:  New Prescriptions    AMOXICILLIN-CLAVULANATE (AUGMENTIN) 875-125 MG PER TABLET    Take 1 tablet by mouth 2 times daily for 10 days    ONDANSETRON (ZOFRAN ODT) 4 MG DISINTEGRATING TABLET    Take 1 tablet by mouth every 8 hours as needed for Nausea or Vomiting       (Please note that portions of this note were completed with a voice recognition program.  Efforts were made to edit the dictations but occasionally words aremis-transcribed.)    MD Alexx Juárez MD  07/14/21 5009

## 2021-07-14 NOTE — ED NOTES
Pt straight cathed for urine. VS reassessed. RR reg. Pt updated on POC.  Will continue to monitor      Brenda Silva RN  07/13/21 3428

## 2021-07-14 NOTE — LETTER
.. [address]  Phone: [phone]  Fax: [phone]    July 14, 2021    Antony Guallpa  [address] [address] [facility] [address] [place] [address]    Dear Ksenia Ying,    Thank you for choosing our [facility] on 7/14/21. Dr. Jim Centeno wanted to make sure that you understand your discharge instructions and that you were able to fill any prescriptions that may have been ordered for you. Please contact the office at the above phone number if advised you to follow up with Dr. Jim Centeno, or if you have any further questions or needs. Also, did you know -                             [facility] practices can often offer you an appointment on the same day that you call for acute issues. *We have some [address] [facility] offices that offer Walk-in appointments; check our website for availability in your community, [email or web address].      *Evisits are now available for patients through [address] If you do not have MyChart and are interested, please contact the office and a staff member may assist you or go to [email or web address] [place].     Sincerely,     Jim Centeno MD and your [facility] [place]

## 2021-07-14 NOTE — ED NOTES
DC instructions reviewed with pt. Pt verbalized understanding. ATB finished. No distress noted.       Bang Camarena RN  07/14/21 3466

## 2021-07-14 NOTE — ED TRIAGE NOTES
Pt comes in through ED lobby. She states a week ago she was seen by her provider for UTI. She was put on an antibiotic. 2 days ago she was called and they changed her antibiotic to Ciprofloxacin 500mg tablet BID. She has had increasing pain in her lower abdomin. She states it is on both sides but worse on her left. She began having difficulty urinating today and was only able to go in small amounts.

## 2021-09-02 ENCOUNTER — HOSPITAL ENCOUNTER (OUTPATIENT)
Dept: WOMENS IMAGING | Age: 76
Discharge: HOME | End: 2021-09-02
Payer: MEDICARE

## 2021-09-02 DIAGNOSIS — Z12.31 VISIT FOR SCREENING MAMMOGRAM: ICD-10-CM

## 2021-09-02 PROCEDURE — 77063 BREAST TOMOSYNTHESIS BI: CPT

## 2021-10-04 ENCOUNTER — HOSPITAL ENCOUNTER (OUTPATIENT)
Dept: CT IMAGING | Age: 76
Discharge: HOME | End: 2021-10-04
Payer: MEDICARE

## 2021-10-04 DIAGNOSIS — C64.2 RENAL CELL CARCINOMA OF LEFT KIDNEY (HCC): ICD-10-CM

## 2021-10-04 LAB — POC CREATININE WHOLE BLOOD: 0.9 MG/DL (ref 0.5–1.2)

## 2021-10-04 PROCEDURE — 6360000004 HC RX CONTRAST MEDICATION: Performed by: PHYSICIAN ASSISTANT

## 2021-10-04 PROCEDURE — 82565 ASSAY OF CREATININE: CPT

## 2021-10-04 PROCEDURE — 74178 CT ABD&PLV WO CNTR FLWD CNTR: CPT

## 2021-10-04 RX ADMIN — IOPAMIDOL 85 ML: 755 INJECTION, SOLUTION INTRAVENOUS at 08:39

## 2021-10-11 ENCOUNTER — OFFICE VISIT (OUTPATIENT)
Dept: UROLOGY | Age: 76
End: 2021-10-11
Payer: MEDICARE

## 2021-10-11 VITALS
WEIGHT: 210 LBS | HEIGHT: 65 IN | DIASTOLIC BLOOD PRESSURE: 92 MMHG | BODY MASS INDEX: 34.99 KG/M2 | SYSTOLIC BLOOD PRESSURE: 148 MMHG | HEART RATE: 80 BPM

## 2021-10-11 DIAGNOSIS — R35.0 URINARY FREQUENCY: ICD-10-CM

## 2021-10-11 DIAGNOSIS — C64.2 RENAL CELL CARCINOMA OF LEFT KIDNEY (HCC): Primary | ICD-10-CM

## 2021-10-11 LAB
BILIRUBIN URINE: NEGATIVE
BLOOD URINE, POC: NEGATIVE
CHARACTER, URINE: CLEAR
COLOR, URINE: YELLOW
GLUCOSE URINE: NEGATIVE MG/DL
KETONES, URINE: NEGATIVE
LEUKOCYTE CLUMPS, URINE: ABNORMAL
NITRITE, URINE: NEGATIVE
PH, URINE: 6 (ref 5–9)
PROTEIN, URINE: NEGATIVE MG/DL
SPECIFIC GRAVITY, URINE: 1.01 (ref 1–1.03)
UROBILINOGEN, URINE: 0.2 EU/DL (ref 0–1)

## 2021-10-11 PROCEDURE — 81003 URINALYSIS AUTO W/O SCOPE: CPT | Performed by: PHYSICIAN ASSISTANT

## 2021-10-11 PROCEDURE — 99213 OFFICE O/P EST LOW 20 MIN: CPT | Performed by: PHYSICIAN ASSISTANT

## 2021-10-11 RX ORDER — LORAZEPAM 0.5 MG/1
0.5 TABLET ORAL PRN
COMMUNITY

## 2021-10-11 NOTE — PROGRESS NOTES
[facility] [id number]  Dept: [phone]  Loc: [phone]      Ms. Marco Mane was seen in follow up for   Chief Complaint   Patient presents with    Follow-up     RCC         HPI:  Ms. 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 Her serial imaging and labs have been negative for metastatic disease. She did undergo an office cystoscopy with Dr. Davie Andersen in January 2019 for an elevated Bladder Cx of 0.24. Her cystoscopy was unremarkable. Repeat testing in May 2019 was within normal limits at 0.11. Her cytoscopy was found to be normal.  She denies HA, chest pain, fever/chills, night sweats, irregular bone pain, N/V, leg pain, hemoptysis, and lightheadedness.  In regards to her urinary health, she denies dysuria, hematuria, weakened stream, urinary retention, or flank pain.  She reports moderate frequency and urgency but is not taking any medication for her irritative symptoms. She is having regular bowel movements and her appetite is normal. She presents today for further evaluation.     Past Medical History:   Diagnosis Date    Anxiety     Arthritis     Asthma     Cancer ([address]     Depression     Diabetes mellitus ([address]     Headache(784.0)     Hyperlipidemia     Hypertension     Nausea & vomiting     Persistent proteinuria associated with type 2 diabetes mellitus (HCC)     Renal cell carcinoma of left kidney ([address] 12/6/2014    Unspecified cerebral artery occlusion with cerebral infarction        Past Surgical History:   Procedure Laterality Date    CARDIAC CATHETERIZATION      CHOLECYSTECTOMY      COLONOSCOPY      ENDOSCOPY, COLON, DIAGNOSTIC      FINGER SURGERY  tumor    HERNIA REPAIR      HIATAL HERNIA REPAIR  7-    stomach repair    GUILLE STEROTACTIC LOC BREAST BIOPSY RIGHT Right 2000    benign    PARTIAL NEPHRECTOMY Left 11/21/2014    robotic left partial nephrectomy    TONSILLECTOMY      TUBAL LIGATION         Current Outpatient Medications on File Prior to Visit   Medication Sig Dispense Refill    LORazepam (ATIVAN) 0.5 MG tablet Take 0.5 mg by mouth as needed for Anxiety.  famotidine (PEPCID) 20 MG tablet Take 1 tablet by mouth 2 times daily 60 tablet 11    VENTOLIN  (90 Base) MCG/ACT inhaler INHALE 2 PUFFS BY MOUTH 4 TIMES A DAY AS NEEDED 1 Inhaler 11    budesonide-formoterol (SYMBICORT) 160-4.5 MCG/ACT AERO Inhale 2 puffs into the lungs 2 times daily 1 Inhaler 11    propranolol (INDERAL LA) 120 MG extended release capsule Take 1 capsule by mouth daily 30 capsule 11    citalopram (CELEXA) 20 MG tablet Take 1 tablet by mouth 2 times daily 60 tablet 11    simvastatin (ZOCOR) 40 MG tablet Take 1 tablet by mouth nightly 30 tablet 11    doxepin (SINEQUAN) 10 MG capsule Take 1 capsule by mouth nightly 30 capsule 11    omeprazole (PRILOSEC) 40 MG delayed release capsule Take 1 capsule by mouth daily 30 capsule 11    bumetanide (BUMEX) 0.5 MG tablet Take 1 tablet by mouth daily 30 tablet 11    fluconazole (DIFLUCAN) 100 MG tablet TAKE 1 TABLET BY MOUTH EVERY DAY 30 tablet 11    Probiotic Product ([employer]S PROBIOTIC) CAPS Take by mouth      oxybutynin (DITROPAN-XL) 5 MG extended release tablet Take 5 mg by mouth daily      diclofenac sodium 1 % GEL Apply 2 g topically 4 times daily 2 Tube 1    acetaminophen (TYLENOL) 500 MG tablet Take 500 mg by mouth every 6 hours as needed for Pain      Cholecalciferol (VITAMIN D3) 5000 units TABS Take by mouth daily       aspirin 81 MG tablet Take 81 mg by mouth daily      fexofenadine (ALLEGRA) 180 MG tablet Take 180 mg by mouth daily.  Multiple Vitamin (MULTI-VITAMINS PO) Take  by mouth daily.       nitroGLYCERIN (NITROSTAT) 0.4 MG SL tablet Place 1 tablet under the tongue every 5 minutes as needed for Chest pain (Patient not taking: Reported on 10/11/2021) 25 tablet 1     Current Facility-Administered Medications on File Prior to Visit   Medication Dose Route Frequency Provider Last Rate Last Admin    cefOXitin (MEFOXIN) 2 g in dextrose 5% 50 mL (mini-bag)  2 g IntraVENous Once Karen Mercedes MD           Allergies   Allergen Reactions    Latex Hives     Patient noted in preop that she might be allergic - maybe breaking out when exposed.  Demerol Hcl [Meperidine] Nausea And Vomiting     confusion    Propoxyphene     Tape [place] Tape]        Family History   Problem Relation Age of Onset    Heart Disease Mother     High Blood Pressure Mother     High Cholesterol Mother     Stroke Mother     Breast Cancer Mother 76    Cancer Father     Heart Disease Father     High Blood Pressure Father     High Cholesterol Father     Breast Cancer Sister 79       Social History     Socioeconomic History    Marital status:      Spouse name: Not on file    Number of children: Not on file    Years of education: Not on file    Highest education level: Not on file   Occupational History    Not on file   Tobacco Use    Smoking status: Never Smoker    Smokeless tobacco: Never Used   Vaping Use    Vaping Use: Never used   Substance and Sexual Activity    Alcohol use: No    Drug use: No    Sexual activity: Not on file   Other Topics Concern    Not on file   Social History Narrative    Not on file     Social Determinants of Health     Financial Resource Strain: Low Risk     Difficulty of Paying Living Expenses: Not hard at all   Food Insecurity: No Food Insecurity    Worried About Running Out of Food in the Last Year: Never true    Shar of Food in the Last Year: Never true   Transportation Needs:     Lack of Transportation (Medical):      Lack of Transportation (Non-Medical):    Physical Activity:     Days of Exercise per Week:     Minutes of Exercise per Session:    Stress:     Feeling no generalized tenderness. There is no rebound, guarding, or rigidity. No CVA tenderness.    Musculoskeletal: No pitting edema or calf tenderness. Lymphadenopathy: Negative  Neurological: Alert and oriented to person, place, and time. No cranial nerve deficit. Skin: Skin is warm and dry. Not diaphoretic. Psychiatric: Normal mood and affect. Behavior is normal.   Nursing note and vitals reviewed. Labs    Results for POC orders placed in visit on 10/11/21   POCT Urinalysis No Micro (Auto)   Result Value Ref Range    Glucose, Ur Negative NEGATIVE mg/dl    Bilirubin Urine Negative     Ketones, Urine Negative NEGATIVE    Specific Gravity, Urine 1.015 1.002 - 1.030    Blood, UA POC Negative NEGATIVE    pH, Urine 6.00 5.0 - 9.0    Protein, Urine Negative NEGATIVE mg/dl    Urobilinogen, Urine 0.20 0.0 - 1.0 eu/dl    Nitrite, Urine Negative NEGATIVE    Leukocyte Clumps, Urine Large (A) NEGATIVE    Color, Urine Yellow YELLOW-STRAW    Character, Urine Clear [id number].CLOUD       Lab Results   Component Value Date    CREATININE 1.0 07/13/2021    BUN 14 07/13/2021     07/13/2021    K 3.7 07/13/2021     07/13/2021    CO2 24 07/13/2021     Radiology:     CT abdomen and pelvis with and without contrast (October 2021)    FINDINGS:        Lower thorax: Visualized lung bases are clear. There is no pleural effusion. A small hiatal hernia is present.       Abdomen: There is no free intraperitoneal air or fluid. Bowel is normal in course and caliber without evidence of obstruction. The gallbladder is surgically absent. There is fatty replacement of the pancreas. Surgical changes at the medial left kidney    are stable compared to the prior study. Cortical irregularity in the left kidney is likely postsurgical. The liver, spleen, adrenal glands and right kidney are normal. Atherosclerotic calcifications are present in the abdominal aorta without evidence of    aneurysm.  There is no mesenteric or retroperitoneal lymphadenopathy. Degenerative changes in the lumbar spine are stable. No aggressive osseous lesions are identified.       Pelvis: The urinary bladder is incompletely distended. Phleboliths are noted in the pelvis. There is no pelvic or inguinal lymphadenopathy. Diverticula are seen in the sigmoid colon without evidence of diverticulitis. Degenerative changes are present in    the pelvis without evidence of aggressive osseous lesions.           Impression   1. No acute intra-abdominal or intrapelvic findings. 2. Stable postsurgical cortical irregularity in the left kidney. 3. No metastatic disease in the abdomen or pelvis. 4. Uncomplicated sigmoid colon diverticulosis. Assessment/Plan:    1. 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 CT scan abdomen and pelvis in October 2021 demonstrates no disease recurrence. Plan renal US and CXR next year. Call if any questions or concerns in.       2. Mild diverticulosis- Patient follows with Dr. Bobo Smith. Asymptomatic currently but did have a diverticulitis in the summer and was treated with a ten day course of antibiotics.     3. Microscopic hematuria/Abnormal Bladder Cx- Last Bladder Cx in October 2020 was normal at 0.09. Will send cytology today.      4. Left superficial cervical/supraclavicular lymphadenopathy- Resolved. 5. Urinary urgency/frequency- Urine dip positive for LE. Will send urine for culture. Will start Myrbetriq 25 mg daily samples. Patient to call with a status report in three to four weeks.  Call immediately with headaches or urinary retention.

## 2021-10-12 LAB
ORGANISM: ABNORMAL
URINE CULTURE, ROUTINE: ABNORMAL

## 2021-10-14 ENCOUNTER — TELEPHONE (OUTPATIENT)
Dept: UROLOGY | Age: 76
End: 2021-10-14

## 2021-10-14 NOTE — TELEPHONE ENCOUNTER
Patient advised of the urine results. She denies having any symptoms of burning, urgency, and frequency. She will call the office back if she develops symptoms. Thank you.

## 2021-10-14 NOTE — TELEPHONE ENCOUNTER
Ms. Tea Smith urine is significant for mixed growth. If she is symptomatic, we should obtain a Guidance Comprehensive Panel.

## 2021-11-01 ENCOUNTER — TELEPHONE (OUTPATIENT)
Dept: UROLOGY | Age: 76
End: 2021-11-01

## 2021-11-01 DIAGNOSIS — N32.81 OAB (OVERACTIVE BLADDER): Primary | ICD-10-CM

## 2021-11-01 NOTE — TELEPHONE ENCOUNTER
Pt called stating that Estela May would call her in a cheaper prescription other than myrbetriq.   Please advise

## 2021-11-03 RX ORDER — SOLIFENACIN SUCCINATE 5 MG/1
5 TABLET, FILM COATED ORAL DAILY
Qty: 30 TABLET | Refills: 2 | Status: SHIPPED | OUTPATIENT
Start: 2021-11-03 | End: 2021-12-02

## 2021-11-03 NOTE — TELEPHONE ENCOUNTER
Vesicare 5 mg po daily sent to pharmacy. Please have patient call pharmacy to check price before going to the store to . Will start with lowest dose and then can move up to a higher dose if needed.

## 2021-11-30 DIAGNOSIS — N32.81 OAB (OVERACTIVE BLADDER): ICD-10-CM

## 2021-11-30 NOTE — TELEPHONE ENCOUNTER
Martha Musnon called requesting a refill on the following medications:  Requested Prescriptions     Pending Prescriptions Disp Refills    solifenacin (VESICARE) 5 MG tablet [Pharmacy Med Name: SOLIFENACIN 5 MG TABLET] 30 tablet 2     Sig: TAKE 1 TABLET BY MOUTH [id number] verified:  .pv      Date of last visit: 10/11/2021  Date of next visit (if applicable): 13/60/6138

## 2021-12-02 RX ORDER — SOLIFENACIN SUCCINATE 5 MG/1
TABLET, FILM COATED ORAL
Qty: 30 TABLET | Refills: 2 | Status: SHIPPED | OUTPATIENT
Start: 2021-12-02 | End: 2022-03-14

## 2021-12-19 DIAGNOSIS — K21.9 GASTROESOPHAGEAL REFLUX DISEASE, UNSPECIFIED WHETHER ESOPHAGITIS PRESENT: ICD-10-CM

## 2021-12-20 RX ORDER — FAMOTIDINE 20 MG/1
TABLET, FILM COATED ORAL
Qty: 180 TABLET | Refills: 3 | OUTPATIENT
Start: 2021-12-20

## 2021-12-20 NOTE — TELEPHONE ENCOUNTER
Julius Whitaker needs refill of   Requested Prescriptions     Pending Prescriptions Disp Refills    famotidine (PEPCID) 20 MG tablet [Pharmacy Med Name: FAMOTIDINE 20 MG TABLET] 180 tablet 3     Sig: TAKE 1 TABLET BY MOUTH TWICE A DAY       Last Filled on:  6/29/2021 for 30 days with 11 refills     Last Visit Date:  6/29/2021    Next Visit Date:    Visit date not found

## 2022-01-20 ENCOUNTER — TELEPHONE (OUTPATIENT)
Dept: UROLOGY | Age: 77
End: 2022-01-20

## 2022-01-25 ENCOUNTER — VIRTUAL VISIT (OUTPATIENT)
Dept: FAMILY MEDICINE CLINIC | Age: 77
End: 2022-01-25
Payer: MEDICARE

## 2022-01-25 DIAGNOSIS — B35.6 TINEA CRURIS: Primary | ICD-10-CM

## 2022-01-25 PROCEDURE — 99442 PR PHYS/QHP TELEPHONE EVALUATION 11-20 MIN: CPT | Performed by: FAMILY MEDICINE

## 2022-01-25 ASSESSMENT — PATIENT HEALTH QUESTIONNAIRE - PHQ9
1. LITTLE INTEREST OR PLEASURE IN DOING THINGS: 1
2. FEELING DOWN, DEPRESSED OR HOPELESS: 1
SUM OF ALL RESPONSES TO PHQ QUESTIONS 1-9: 2
SUM OF ALL RESPONSES TO PHQ9 QUESTIONS 1 & 2: 2
SUM OF ALL RESPONSES TO PHQ QUESTIONS 1-9: 2

## 2022-01-25 NOTE — PATIENT INSTRUCTIONS
Patient Education        Jock Itch: Care Instructions  Overview  Jock itch is a fungal infection of the groin. The fungus that causes jock itch lives on your skin. It often affects male athletes, but anyone can get jock itch. You may get an itchy rash on your inner thighs and rear end (buttocks). It spreads and starts to itch when you sweat or are in steamy showers or locker rooms. Jock itch should end soon if you keep your skin dry after you clean it. You can treat jock itch at home with antifungal creams that you can buy without a prescription. Follow-up care is a key part of your treatment and safety. Be sure to make and go to all appointments, and call your doctor if you are having problems. It's also a good idea to know your test results and keep a list of the medicines you take. How can you care for yourself at home? · Wash the rash with soap and water. Pat the skin dry. · Put a cool compress on the skin to relieve itching. · Spread antifungal cream over and around the entire edge of the rash. Follow the directions on the package. · To avoid spreading it, wash your hands well after treating or touching the rash. · If your doctor prescribed medicine, take it exactly as directed. Call your doctor if you have any problems with your medicine. · Try not to scratch the rash. · Shower or bathe daily and after you exercise. · Keep your skin dry as much as possible to allow it to heal.  · Until your jock itch is cured, wear loose-fitting cotton clothing. Avoid tight underwear, pants, and tights. · Wash your supporters and shorts after every wearing. · Do not share clothing, sports equipment, towels, or sheets to avoid spreading the fungi to other people. To prevent jock itch  · Put on socks before you put on underwear if you have athlete's foot. This action helps prevent the fungus on your feet from spreading to your groin.   · Wash your workout clothes, underwear, socks, and towels after each use.  · Keep your groin, inner thighs, and buttocks clean and dry, especially after you exercise and shower. · Do not borrow or lend clothing, sports equipment, towels, or sheets. · Wear slippers or sandals in locker rooms, showers, and public bathing areas. When should you call for help? Call your doctor now or seek immediate medical care if:    · You have signs of infection, such as:  ? Increased pain, swelling, warmth, or redness. ? Red streaks leading from the rash. ? Pus draining from the rash. ? A fever. Watch closely for changes in your health, and be sure to contact your doctor if:    · You do not get better as expected. Where can you learn more? Go to [email or web address] and sign in to your [employer] account. Enter G303 in the [employer] box to learn more about \"Jock Itch: Care Instructions. \"     If you do not have an account, please click on the \"Sign Up Now\" link. Current as of: March 3, 2021               Content Version: 13.1  © 9384-2696 Healthwise, Incorporated. Care instructions adapted under license by [facility] [place] ([facility]). If you have questions about a medical condition or this instruction, always ask your healthcare professional. [address] any warranty or liability for your use of this information.

## 2022-01-25 NOTE — PROGRESS NOTES
Fox Allen is a 77 y.o. female evaluated via telephone on 1/25/2022. Consent:  She and/or health care decision maker is aware that that she may receive a bill for this telephone service, which includes applicable co-pays, depending on her insurance coverage, and has provided verbal consent to proceed. Documentation:  I communicated with the patient and/or health care decision maker about rash. Details of this discussion including any medical advice provided: has had a fungal rash in her groin for a few years. Uses powder and daily diflucan. The rash seems to be spreading up. , non smoker, pmh reviewed. I affirm this is a Patient Initiated Episode with a Patient who has not had a related appointment within my department in the past 7 days or scheduled within the next 24 hours. Patient identification was verified at the start of the visit: Yes    Total Time: minutes: 11-20 minutes    1. Tinea cruris  -chronic condition, exacerbated, refer to dermatology for further evaluation and treatment  - [facility] - Rosa Isela Fuller MD, Dermatology, Jaspal Medina, was evaluated through a synchronous (real-time) audio-video encounter. The patient (or guardian if applicable) is aware that this is a billable service, which includes applicable co-pays. This Virtual Visit was conducted with patient's (and/or legal guardian's) consent. The visit was conducted pursuant to the emergency declaration under the [facility], [address] [place] [address] authority and the [employer] Act. Patient identification was verified, and a caregiver was present when appropriate. The patient was located at home in a state where the provider was licensed to provide care.        Note: not billable if this call serves to triage the patient into an appointment for the relevant concern      Amanda Hanley MD

## 2022-03-12 DIAGNOSIS — N32.81 OAB (OVERACTIVE BLADDER): ICD-10-CM

## 2022-03-14 RX ORDER — SOLIFENACIN SUCCINATE 5 MG/1
TABLET, FILM COATED ORAL
Qty: 90 TABLET | Refills: 3 | Status: SHIPPED | OUTPATIENT
Start: 2022-03-14

## 2022-03-14 NOTE — TELEPHONE ENCOUNTER
Tashi Hurst called requesting a refill on the following medications:  Requested Prescriptions     Pending Prescriptions Disp Refills    solifenacin (VESICARE) 5 MG tablet [Pharmacy Med Name: SOLIFENACIN 5 MG TABLET] 90 tablet      Sig: TAKE 1 TABLET BY MOUTH [id number] verified:  .pv      Date of last visit: 10/11/2021  Date of next visit (if applicable): 70/18/7261

## 2022-03-17 ENCOUNTER — OFFICE VISIT (OUTPATIENT)
Dept: CARDIOLOGY CLINIC | Age: 77
End: 2022-03-17
Payer: MEDICARE

## 2022-03-17 VITALS
HEART RATE: 72 BPM | BODY MASS INDEX: 36.49 KG/M2 | SYSTOLIC BLOOD PRESSURE: 144 MMHG | HEIGHT: 65 IN | DIASTOLIC BLOOD PRESSURE: 84 MMHG | WEIGHT: 219 LBS

## 2022-03-17 DIAGNOSIS — I25.10 CORONARY ARTERY DISEASE INVOLVING NATIVE CORONARY ARTERY OF NATIVE HEART WITHOUT ANGINA PECTORIS: Primary | ICD-10-CM

## 2022-03-17 DIAGNOSIS — E78.01 FAMILIAL HYPERCHOLESTEROLEMIA: ICD-10-CM

## 2022-03-17 DIAGNOSIS — I10 PRIMARY HYPERTENSION: ICD-10-CM

## 2022-03-17 PROCEDURE — 93000 ELECTROCARDIOGRAM COMPLETE: CPT | Performed by: NUCLEAR MEDICINE

## 2022-03-17 PROCEDURE — 99213 OFFICE O/P EST LOW 20 MIN: CPT | Performed by: NUCLEAR MEDICINE

## 2022-03-17 NOTE — PROGRESS NOTES
[facility] [address] [place] [address] [address]  [address]  [facility] [address]  Dept: [phone]  Dept Fax: [phone]  Loc: [phone]    Visit Date: 3/17/2022    Remy Lim is a 69 y.o. female who presents todayfor:  Chief Complaint   Patient presents with    Check-Up     EKG done today    Coronary Artery Disease    Hypertension    Hyperlipidemia   cath before  Mild CAd  No chest pain  No changes in breathing  Bp is stable   No dizziness  No syncope  On statins for hyperlipidemia        HPI:  HPI  Past Medical History:   Diagnosis Date    Anxiety     Arthritis     Asthma     [facility]     Depression     Diabetes mellitus ([facility]     Headache(784.0)     Hyperlipidemia     Hypertension     Nausea & vomiting     Persistent proteinuria associated with type 2 diabetes mellitus ([facility]     Renal cell carcinoma of left kidney ([facility] 12/6/2014    Unspecified cerebral artery occlusion with cerebral infarction       Past Surgical History:   Procedure Laterality Date    CARDIAC CATHETERIZATION      CHOLECYSTECTOMY      COLONOSCOPY      ENDOSCOPY, COLON, DIAGNOSTIC      FINGER SURGERY  tumor    HERNIA REPAIR      HIATAL HERNIA REPAIR  7-    stomach repair    GUILLE STEROTACTIC LOC BREAST BIOPSY RIGHT Right 2000    benign    PARTIAL NEPHRECTOMY Left 11/21/2014    robotic left partial nephrectomy    TONSILLECTOMY      TUBAL LIGATION       Family History   Problem Relation Age of Onset    Heart Disease Mother     High Blood Pressure Mother     High Cholesterol Mother     Stroke Mother     Breast Cancer Mother 76    Cancer Father     Heart Disease Father     High Blood Pressure Father     High Cholesterol Father     Breast Cancer Sister 79     Social History     Tobacco Use    Smoking status: Never Smoker    Smokeless tobacco: Never Used   Substance Use Topics    Alcohol use: No      Current Outpatient Medications   Medication Sig Dispense Refill    solifenacin (VESICARE) 5 MG tablet TAKE 1 TABLET BY MOUTH EVERY DAY 90 tablet 3    LORazepam (ATIVAN) 0.5 MG tablet Take 0.5 mg by mouth as needed for Anxiety.  famotidine (PEPCID) 20 MG tablet Take 1 tablet by mouth 2 times daily 60 tablet 11    VENTOLIN  (90 Base) MCG/ACT inhaler INHALE 2 PUFFS BY MOUTH 4 TIMES A DAY AS NEEDED 1 Inhaler 11    budesonide-formoterol (SYMBICORT) 160-4.5 MCG/ACT AERO Inhale 2 puffs into the lungs 2 times daily 1 Inhaler 11    propranolol (INDERAL LA) 120 MG extended release capsule Take 1 capsule by mouth daily 30 capsule 11    citalopram (CELEXA) 20 MG tablet Take 1 tablet by mouth 2 times daily 60 tablet 11    simvastatin (ZOCOR) 40 MG tablet Take 1 tablet by mouth nightly 30 tablet 11    doxepin (SINEQUAN) 10 MG capsule Take 1 capsule by mouth nightly 30 capsule 11    omeprazole (PRILOSEC) 40 MG delayed release capsule Take 1 capsule by mouth daily 30 capsule 11    bumetanide (BUMEX) 0.5 MG tablet Take 1 tablet by mouth daily 30 tablet 11    fluconazole (DIFLUCAN) 100 MG tablet TAKE 1 TABLET BY MOUTH EVERY DAY 30 tablet 11    nitroGLYCERIN (NITROSTAT) 0.4 MG SL tablet Place 1 tablet under the tongue every 5 minutes as needed for Chest pain 25 tablet 1    Probiotic Product ([employer]S PROBIOTIC) CAPS Take by mouth      oxybutynin (DITROPAN-XL) 5 MG extended release tablet Take 5 mg by mouth daily      diclofenac sodium 1 % GEL Apply 2 g topically 4 times daily 2 Tube 1    acetaminophen (TYLENOL) 500 MG tablet Take 500 mg by mouth every 6 hours as needed for Pain      Cholecalciferol (VITAMIN D3) 5000 units TABS Take by mouth daily       aspirin 81 MG tablet Take 81 mg by mouth daily      fexofenadine (ALLEGRA) 180 MG tablet Take 180 mg by mouth daily.  Multiple Vitamin (MULTI-VITAMINS PO) Take  by mouth daily. No current facility-administered medications for this visit.      Facility-Administered Medications Ordered in Other Visits   Medication Dose Route Frequency Provider Last Rate Last Admin    cefOXitin (MEFOXIN) 2 g in dextrose 5% 50 mL (mini-bag)  2 g IntraVENous Once Peggy Desai MD         Allergies   Allergen Reactions    Latex Hives     Patient noted in preop that she might be allergic - maybe breaking out when exposed.  Demerol Hcl [Meperidine] Nausea And Vomiting     confusion    Propoxyphene     Tape A[employer] Tape]      Health Maintenance   Topic Date Due    DTaP/Tdap/Td vaccine (1 - Tdap) Never done    Shingles Vaccine (1 of 2) Never done   [employer] Visit (AWV)  09/03/2021    COVID-19 Vaccine (3 - Booster for Moderna series) 09/05/2021    Lipid screen  06/29/2022    Potassium monitoring  07/13/2022    Creatinine monitoring  07/13/2022    Depression Monitoring  01/25/2023    DEXA (modify frequency per FRAX score)  Completed    Flu vaccine  Completed    Pneumococcal 65+ years Vaccine  Completed    Hepatitis C screen  Completed    Hepatitis A vaccine  Aged Out    Hib vaccine  Aged Out    Meningococcal (ACWY) vaccine  Aged Out       Subjective:  Review of Systems  General:   No fever, no chills, No fatigue or weight loss  Pulmonary:    No dyspnea, no wheezing  Cardiac:    Denies recent chest pain,   GI:     No nausea or vomiting, no abdominal pain  Neuro:    No dizziness or light headedness,   Musculoskeletal:  No recent active issues  Extremities:   No edema, no obvious claudication       Objective:  Physical Exam  BP (!) 144/84   Pulse 72   Ht 5' 5\" (1.651 m)   Wt 219 lb (99.3 kg)   BMI 36.44 kg/m²   General:   Well developed, well nourished  Lungs:   Clear to auscultation  Heart:    Normal S1 S2, Slight murmur. no rubs, no gallops  Abdomen:   Soft, non tender, no organomegalies, positive bowel sounds  Extremities:   No edema, no cyanosis, good peripheral pulses  Neurological:   Awake, alert, oriented.  No obvious focal deficits  Musculoskelatal:  No obvious deformities    Assessment:      Diagnosis Orders   1. Coronary artery disease involving native coronary artery of native heart without angina pectoris  EKG 12 Lead   2. Primary hypertension     3. Familial hypercholesterolemia     as above  Cardiac fair for now  ECG in office was done today. I reviewed the ECG. No acute findings      Plan:  No follow-ups on file. As above  Continue risk factor modification and medical management  Thank you for allowing me to participate in the care of your patient. Please don't hesitate to contact me regarding any further issues related to the patient care    Orders Placed:  Orders Placed This Encounter   Procedures    EKG 12 Lead     Order Specific Question:   Reason for Exam?     Answer: Other       Medications Prescribed:  No orders of the defined types were placed in this encounter. Discussed use, benefit, and side effects of prescribed medications. All patient questions answered. Pt voicedunderstanding. Instructed to continue current medications, diet and exercise. Continue risk factor modification and medical management. Patient agreed with treatment plan. Follow up as directed.     Electronically signedby Patel Humphrey MD on 3/17/2022 at 11:50 AM

## 2022-04-24 DIAGNOSIS — E78.00 HYPERCHOLESTEROLEMIA: ICD-10-CM

## 2022-04-24 DIAGNOSIS — R60.9 PERIPHERAL EDEMA: ICD-10-CM

## 2022-04-24 DIAGNOSIS — I10 BENIGN ESSENTIAL HTN: ICD-10-CM

## 2022-04-24 DIAGNOSIS — F51.01 PRIMARY INSOMNIA: ICD-10-CM

## 2022-04-24 DIAGNOSIS — K21.9 GASTROESOPHAGEAL REFLUX DISEASE, UNSPECIFIED WHETHER ESOPHAGITIS PRESENT: ICD-10-CM

## 2022-04-24 DIAGNOSIS — F32.A DEPRESSION, UNSPECIFIED DEPRESSION TYPE: ICD-10-CM

## 2022-04-25 RX ORDER — BUMETANIDE 0.5 MG/1
TABLET ORAL
Qty: 90 TABLET | Refills: 3 | OUTPATIENT
Start: 2022-04-25

## 2022-04-25 RX ORDER — OMEPRAZOLE 40 MG/1
CAPSULE, DELAYED RELEASE ORAL
Qty: 90 CAPSULE | Refills: 3 | OUTPATIENT
Start: 2022-04-25

## 2022-04-25 RX ORDER — PROPRANOLOL HYDROCHLORIDE 120 MG/1
CAPSULE, EXTENDED RELEASE ORAL
Qty: 90 CAPSULE | Refills: 3 | OUTPATIENT
Start: 2022-04-25

## 2022-04-25 RX ORDER — DOXEPIN HYDROCHLORIDE 10 MG/1
CAPSULE ORAL
Qty: 90 CAPSULE | Refills: 3 | OUTPATIENT
Start: 2022-04-25

## 2022-04-25 RX ORDER — CITALOPRAM 20 MG/1
20 TABLET ORAL 2 TIMES DAILY
Qty: 180 TABLET | Refills: 3 | OUTPATIENT
Start: 2022-04-25

## 2022-04-25 RX ORDER — FAMOTIDINE 20 MG/1
20 TABLET, FILM COATED ORAL 2 TIMES DAILY
Qty: 180 TABLET | Refills: 3 | OUTPATIENT
Start: 2022-04-25

## 2022-04-25 RX ORDER — SIMVASTATIN 40 MG
TABLET ORAL
Qty: 90 TABLET | Refills: 3 | OUTPATIENT
Start: 2022-04-25

## 2022-04-25 NOTE — TELEPHONE ENCOUNTER
Harvie Goodpasture needs refill of   Requested Prescriptions     Pending Prescriptions Disp Refills    famotidine (PEPCID) 20 MG tablet [Pharmacy Med Name: FAMOTIDINE 20 MG TABLET] 180 tablet 3     Sig: TAKE 1 TABLET BY MOUTH 2 TIMES DAILY    doxepin (SINEQUAN) 10 MG capsule [Pharmacy Med Name: DOXEPIN 10 MG CAPSULE] 90 capsule 3     Sig: TAKE 1 CAPSULE BY MOUTH EVERY DAY AT NIGHT    bumetanide (BUMEX) 0.5 MG tablet [Pharmacy Med Name: BUMETANIDE 0.5 MG TABLET] 90 tablet 3     Sig: TAKE 1 TABLET BY MOUTH EVERY DAY    omeprazole (PRILOSEC) 40 MG delayed release capsule [Pharmacy Med Name: OMEPRAZOLE DR 40 MG CAPSULE] 90 capsule 3     Sig: TAKE 1 CAPSULE BY MOUTH EVERY DAY    simvastatin (ZOCOR) 40 MG tablet [Pharmacy Med Name: SIMVASTATIN 40 MG TABLET] 90 tablet 3     Sig: TAKE 1 TABLET BY MOUTH EVERY DAY AT NIGHT    propranolol (INDERAL LA) 120 MG extended release capsule [Pharmacy Med Name: PROPRANOLOL  MG CAPSULE] 90 capsule 3     Sig: TAKE 1 CAPSULE BY MOUTH EVERY DAY    citalopram (CELEXA) 20 MG tablet [Pharmacy Med Name: CITALOPRAM HBR 20 MG TABLET] 180 tablet 3     Sig: TAKE 1 TABLET BY MOUTH 2 TIMES DAILY       Last Filled on:  6/29/2021 for a years supply    Last Visit Date:  1/25/2022    Next Visit Date:    Visit date not found

## 2022-06-08 ENCOUNTER — OFFICE VISIT (OUTPATIENT)
Dept: FAMILY MEDICINE CLINIC | Age: 77
End: 2022-06-08
Payer: MEDICARE

## 2022-06-08 VITALS
HEART RATE: 76 BPM | OXYGEN SATURATION: 95 % | DIASTOLIC BLOOD PRESSURE: 76 MMHG | BODY MASS INDEX: 35.35 KG/M2 | WEIGHT: 212.4 LBS | TEMPERATURE: 97.4 F | RESPIRATION RATE: 18 BRPM | SYSTOLIC BLOOD PRESSURE: 124 MMHG

## 2022-06-08 DIAGNOSIS — I10 BENIGN ESSENTIAL HTN: ICD-10-CM

## 2022-06-08 DIAGNOSIS — B35.6 TINEA CRURIS: ICD-10-CM

## 2022-06-08 DIAGNOSIS — R05.9 COUGH: ICD-10-CM

## 2022-06-08 DIAGNOSIS — L30.4 INTERTRIGO: Primary | ICD-10-CM

## 2022-06-08 PROCEDURE — 99214 OFFICE O/P EST MOD 30 MIN: CPT | Performed by: FAMILY MEDICINE

## 2022-06-08 PROCEDURE — 1123F ACP DISCUSS/DSCN MKR DOCD: CPT | Performed by: FAMILY MEDICINE

## 2022-06-08 RX ORDER — NYSTATIN 100000 U/G
CREAM TOPICAL 2 TIMES DAILY
COMMUNITY
End: 2022-06-08

## 2022-06-08 RX ORDER — FLUCONAZOLE 100 MG/1
100 TABLET ORAL DAILY
Qty: 30 TABLET | Refills: 1 | Status: SHIPPED | OUTPATIENT
Start: 2022-06-08 | End: 2022-08-07

## 2022-06-08 RX ORDER — BENZONATATE 200 MG/1
200 CAPSULE ORAL 3 TIMES DAILY PRN
Qty: 30 CAPSULE | Refills: 0 | Status: SHIPPED | OUTPATIENT
Start: 2022-06-08 | End: 2022-07-13 | Stop reason: ALTCHOICE

## 2022-06-08 RX ORDER — TRIAMCINOLONE ACETONIDE 1 MG/G
CREAM TOPICAL 2 TIMES DAILY
COMMUNITY

## 2022-06-08 RX ORDER — NYSTATIN 100000 [USP'U]/G
POWDER TOPICAL
Qty: 60 G | Refills: 2 | Status: SHIPPED | OUTPATIENT
Start: 2022-06-08 | End: 2022-06-22 | Stop reason: SDUPTHER

## 2022-06-08 ASSESSMENT — ENCOUNTER SYMPTOMS
COUGH: 0
NAUSEA: 0
SORE THROAT: 0
BACK PAIN: 0
CONSTIPATION: 0
VOMITING: 0
DIARRHEA: 0
CHEST TIGHTNESS: 0
RHINORRHEA: 0
EYE PAIN: 0
SHORTNESS OF BREATH: 0
WHEEZING: 0
BLOOD IN STOOL: 0
ABDOMINAL PAIN: 0

## 2022-06-08 NOTE — PATIENT INSTRUCTIONS
Patient Education        Jock Itch: Care Instructions  Overview  Jock itch is a fungal infection of the groin. The fungus that causes jock itch lives on your skin. It often affects male athletes, but anyone can get jock itch. You may get an itchy rash on your inner thighs and rear end (buttocks). It spreads and starts to itch when you sweat or are in steamy showers or lockerrooms. Jock itch should end soon if you keep your skin dry after you clean it. You can treat jock itch at home with antifungal creams that you can buy without aprescription. Follow-up care is a key part of your treatment and safety. Be sure to make and go to all appointments, and call your doctor if you are having problems. It's also a good idea to know your test results and keep alist of the medicines you take. How can you care for yourself at home?  Wash the rash with soap and water. Pat the skin dry.  Put a cool compress on the skin to relieve itching.  Spread antifungal cream over and around the entire edge of the rash. Follow the directions on the package.  To avoid spreading it, wash your hands well after treating or touching the rash.  If your doctor prescribed medicine, take it exactly as directed. Call your doctor if you have any problems with your medicine.  Try not to scratch the rash.  Shower or bathe daily and after you exercise.  Keep your skin dry as much as possible to allow it to heal.   Until your jock itch is cured, wear loose-fitting cotton clothing. Avoid tight underwear, pants, and tights. [employer] your supporters and shorts after every wearing.  Do not share clothing, sports equipment, towels, or sheets to avoid spreading the fungi to other people. To prevent jock itch   Put on socks before you put on underwear if you have athlete's foot. This action helps prevent the fungus on your feet from spreading to your groin.  Wash your workout clothes, underwear, socks, and towels after each use.    Keep your groin, inner thighs, and buttocks clean and dry, especially after you exercise and shower.  Do not borrow or lend clothing, sports equipment, towels, or sheets.  Wear slippers or sandals in locker rooms, showers, and public bathing areas. When should you call for help? Call your doctor now or seek immediate medical care if:     You have signs of infection, such as:  ? Increased pain, swelling, warmth, or redness. ? Red streaks leading from the rash. ? Pus draining from the rash. ? A fever. Watch closely for changes in your health, and be sure to contact your doctor if:     You do not get better as expected. Where can you learn more? Go to [email or web address]://Exuru![employer]erma Scienceseb."[email or web address] org and sign in to your [facility] account. Enter G303 in the SpotH[employer] box to learn more about \"Jock Itch: Care Instructions. \"     If you do not have an account, please click on the \"Sign Up Now\" link. Current as of: November 15, 2021               Content Version: 13.2  © 9901-5154 Healthwise, Incorporated. Care instructions adapted under license by [facility] ([facility]). If you have questions about a medical condition or this instruction, always ask your healthcare professional. [address] any warranty or liability for your use of this information.

## 2022-06-08 NOTE — PROGRESS NOTES
Leeanne Self (:  1945) is a 69 y.o. female,Established patient, here for evaluation of the following chief complaint(s):  Rash (under left breast)         ASSESSMENT/PLAN:  1. Intertrigo  -     nystatin (MYCOSTATIN) 287859 UNIT/GM powder; Apply 3 times daily. , Disp-60 g, R-2, Normal  -     fluconazole (DIFLUCAN) 100 MG tablet; Take 1 tablet by mouth daily, Disp-30 tablet, R-1Normal  -chronic condition, exacerbated, change from cream to powder, keep area as dry as possible. Longterm diflucan daily to knock down the yeast.    2. Tinea cruris  -     nystatin (MYCOSTATIN) 819166 UNIT/GM powder; Apply 3 times daily. , Disp-60 g, R-2, Normal  -     fluconazole (DIFLUCAN) 100 MG tablet; Take 1 tablet by mouth daily, Disp-30 tablet, R-1Normal  -chronic condition, exacerbated, change from cream to powder, keep area as dry as possible. Longterm diflucan daily to knock down the yeast.   3. Benign essential HTN  -stable, controlled on propranolol  4. Cough  -     benzonatate (TESSALON) 200 MG capsule; Take 1 capsule by mouth 3 times daily as needed for Cough, Disp-30 capsule, R-0Normal  -monitor sxs, call if not improving      No follow-ups on file. Subjective   SUBJECTIVE/OBJECTIVE:  HPI  Patient here today for a check up. Reviewed BMI of 35. Encouraged diet, exercise and weight loss. 2 days of rash under left breast.  Itches, draining. Using nystatin and valisone cream in the groin from derm. , non smoke, pmh reviewed. Review of Systems   Constitutional: Negative for chills, fatigue, fever and unexpected weight change. HENT: Negative for congestion, ear pain, rhinorrhea and sore throat. Eyes: Negative for pain and visual disturbance. Respiratory: Negative for cough, chest tightness, shortness of breath and wheezing. Cardiovascular: Negative for chest pain and palpitations.    Gastrointestinal: Negative for abdominal pain, blood in stool, constipation, diarrhea, nausea and vomiting. Genitourinary: Negative for difficulty urinating, frequency, hematuria and urgency. Musculoskeletal: Negative for back pain, joint swelling, myalgias and neck pain. Skin: Positive for rash. Neurological: Negative for dizziness and headaches. Hematological: Negative for adenopathy. Does not bruise/bleed easily. Psychiatric/Behavioral: Negative for behavioral problems and sleep disturbance. The patient is not nervous/anxious. Objective   Physical Exam  Vitals and nursing note reviewed. Constitutional:       Appearance: She is well-developed. HENT:      Head: Normocephalic and atraumatic. Right Ear: External ear normal.      Left Ear: External ear normal.      Nose: Nose normal.      Mouth/Throat:      Mouth: Mucous membranes are moist.   Eyes:      Pupils: Pupils are equal, round, and reactive to light. Neck:      Thyroid: No thyromegaly. Cardiovascular:      Rate and Rhythm: Normal rate and regular rhythm. Heart sounds: Normal heart sounds. Pulmonary:      Breath sounds: Normal breath sounds. No wheezing or rales. Chest:       Abdominal:      General: Bowel sounds are normal.      Palpations: Abdomen is soft. Tenderness: There is no abdominal tenderness. There is no guarding or rebound. Musculoskeletal:         General: Normal range of motion. Cervical back: Neck supple. Lymphadenopathy:      Cervical: No cervical adenopathy. Skin:     General: Skin is warm and dry. Findings: No rash. Neurological:      Mental Status: She is alert and oriented to person, place, and time. Cranial Nerves: No cranial nerve deficit. Deep Tendon Reflexes: Reflexes are normal and symmetric. An electronic signature was used to authenticate this note.     --Maria Elena Alcaraz MD

## 2022-06-22 DIAGNOSIS — K21.9 GASTROESOPHAGEAL REFLUX DISEASE, UNSPECIFIED WHETHER ESOPHAGITIS PRESENT: ICD-10-CM

## 2022-06-22 DIAGNOSIS — L30.4 INTERTRIGO: ICD-10-CM

## 2022-06-22 DIAGNOSIS — B35.6 TINEA CRURIS: ICD-10-CM

## 2022-06-22 DIAGNOSIS — E78.00 HYPERCHOLESTEROLEMIA: ICD-10-CM

## 2022-06-22 DIAGNOSIS — J45.40 MODERATE PERSISTENT ASTHMA WITHOUT COMPLICATION: ICD-10-CM

## 2022-06-22 DIAGNOSIS — R60.9 PERIPHERAL EDEMA: ICD-10-CM

## 2022-06-22 DIAGNOSIS — I10 BENIGN ESSENTIAL HTN: ICD-10-CM

## 2022-06-22 RX ORDER — BUMETANIDE 0.5 MG/1
TABLET ORAL
Qty: 30 TABLET | Refills: 0 | Status: SHIPPED | OUTPATIENT
Start: 2022-06-22 | End: 2022-07-13 | Stop reason: SDUPTHER

## 2022-06-22 RX ORDER — NYSTATIN 100000 [USP'U]/G
POWDER TOPICAL
Qty: 60 G | Refills: 0 | Status: SHIPPED | OUTPATIENT
Start: 2022-06-22 | End: 2022-07-13 | Stop reason: SDUPTHER

## 2022-06-22 RX ORDER — FAMOTIDINE 20 MG/1
20 TABLET, FILM COATED ORAL 2 TIMES DAILY
Qty: 60 TABLET | Refills: 0 | Status: SHIPPED | OUTPATIENT
Start: 2022-06-22 | End: 2022-07-13 | Stop reason: SDUPTHER

## 2022-06-22 RX ORDER — SIMVASTATIN 40 MG
TABLET ORAL
Qty: 30 TABLET | Refills: 0 | Status: SHIPPED | OUTPATIENT
Start: 2022-06-22 | End: 2022-07-13 | Stop reason: SDUPTHER

## 2022-06-22 RX ORDER — OMEPRAZOLE 40 MG/1
CAPSULE, DELAYED RELEASE ORAL
Qty: 30 CAPSULE | Refills: 0 | Status: SHIPPED | OUTPATIENT
Start: 2022-06-22 | End: 2022-07-13 | Stop reason: SDUPTHER

## 2022-06-22 RX ORDER — PROPRANOLOL HYDROCHLORIDE 120 MG/1
CAPSULE, EXTENDED RELEASE ORAL
Qty: 30 CAPSULE | Refills: 0 | Status: SHIPPED | OUTPATIENT
Start: 2022-06-22 | End: 2022-07-13 | Stop reason: SDUPTHER

## 2022-06-22 RX ORDER — BUDESONIDE AND FORMOTEROL FUMARATE DIHYDRATE 160; 4.5 UG/1; UG/1
AEROSOL RESPIRATORY (INHALATION)
Qty: 10.2 EACH | Refills: 0 | Status: SHIPPED | OUTPATIENT
Start: 2022-06-22 | End: 2022-07-13 | Stop reason: SDUPTHER

## 2022-06-22 NOTE — TELEPHONE ENCOUNTER
Kasey Sparks needs refill of   Requested Prescriptions     Pending Prescriptions Disp Refills    bumetanide (BUMEX) 0.5 MG tablet [Pharmacy Med Name: BUMETANIDE 0.5 MG TABLET] 90 tablet 3     Sig: TAKE 1 TABLET BY MOUTH EVERY DAY    [facility] 160-4.5 MCG/ACT AERO [Pharmacy Med Name: [address] [place] [address] 160-4.5 MCG INHALER] 10.2 each 11     Sig: TAKE 2 PUFFS BY MOUTH TWICE A DAY    famotidine (PEPCID) 20 MG tablet [Pharmacy Med Name: FAMOTIDINE 20 MG TABLET] 180 tablet 3     Sig: TAKE 1 TABLET BY MOUTH 2 TIMES DAILY    simvastatin (ZOCOR) 40 MG tablet [Pharmacy Med Name: SIMVASTATIN 40 MG TABLET] 90 tablet 3     Sig: TAKE 1 TABLET BY MOUTH EVERY DAY AT NIGHT    omeprazole (PRILOSEC) 40 MG delayed release capsule [Pharmacy Med Name: OMEPRAZOLE DR 40 MG CAPSULE] 90 capsule 3     Sig: TAKE 1 CAPSULE BY MOUTH EVERY DAY    propranolol (INDERAL LA) 120 MG extended release capsule [Pharmacy Med Name: PROPRANOLOL  MG CAPSULE] 90 capsule 3     Sig: TAKE 1 CAPSULE BY MOUTH EVERY DAY       Last Filled on:  6/21/2021    Last Visit Date:  6/8/2022    Next Visit Date:    Visit date not found    Patient needs a physical and refills. Please check if she needs a short term supply. Patient can not come to office until 7/13/2022. Needs short term refills to get her through.  is having surgery.

## 2022-07-13 ENCOUNTER — OFFICE VISIT (OUTPATIENT)
Dept: FAMILY MEDICINE CLINIC | Age: 77
End: 2022-07-13
Payer: MEDICARE

## 2022-07-13 VITALS
SYSTOLIC BLOOD PRESSURE: 124 MMHG | WEIGHT: 211.8 LBS | HEART RATE: 77 BPM | DIASTOLIC BLOOD PRESSURE: 76 MMHG | BODY MASS INDEX: 35.25 KG/M2 | RESPIRATION RATE: 18 BRPM | OXYGEN SATURATION: 94 % | TEMPERATURE: 97.2 F

## 2022-07-13 DIAGNOSIS — F51.01 PRIMARY INSOMNIA: ICD-10-CM

## 2022-07-13 DIAGNOSIS — B35.6 TINEA CRURIS: ICD-10-CM

## 2022-07-13 DIAGNOSIS — K21.9 GASTROESOPHAGEAL REFLUX DISEASE, UNSPECIFIED WHETHER ESOPHAGITIS PRESENT: ICD-10-CM

## 2022-07-13 DIAGNOSIS — J45.40 MODERATE PERSISTENT ASTHMA WITHOUT COMPLICATION: ICD-10-CM

## 2022-07-13 DIAGNOSIS — Z00.00 ANNUAL PHYSICAL EXAM: Primary | ICD-10-CM

## 2022-07-13 DIAGNOSIS — I10 BENIGN ESSENTIAL HTN: ICD-10-CM

## 2022-07-13 DIAGNOSIS — F32.A DEPRESSION, UNSPECIFIED DEPRESSION TYPE: ICD-10-CM

## 2022-07-13 DIAGNOSIS — R60.9 PERIPHERAL EDEMA: ICD-10-CM

## 2022-07-13 DIAGNOSIS — L30.4 INTERTRIGO: ICD-10-CM

## 2022-07-13 DIAGNOSIS — R73.9 HYPERGLYCEMIA: ICD-10-CM

## 2022-07-13 DIAGNOSIS — E78.00 HYPERCHOLESTEROLEMIA: ICD-10-CM

## 2022-07-13 LAB
ALBUMIN SERPL-MCNC: 3.9 G/DL (ref 3.5–5.1)
ALP BLD-CCNC: 124 U/L (ref 38–126)
ALT SERPL-CCNC: 6 U/L (ref 11–66)
ANION GAP SERPL CALCULATED.3IONS-SCNC: 12 MEQ/L (ref 8–16)
AST SERPL-CCNC: 11 U/L (ref 5–40)
AVERAGE GLUCOSE: 117 MG/DL (ref 70–126)
BASOPHILS # BLD: 0.6 %
BASOPHILS ABSOLUTE: 0.1 THOU/MM3 (ref 0–0.1)
BILIRUB SERPL-MCNC: 0.3 MG/DL (ref 0.3–1.2)
BUN BLDV-MCNC: 14 MG/DL (ref 7–22)
CALCIUM SERPL-MCNC: 8.8 MG/DL (ref 8.5–10.5)
CHLORIDE BLD-SCNC: 101 MEQ/L (ref 98–111)
CHOLESTEROL, TOTAL: 180 MG/DL (ref 100–199)
CO2: 27 MEQ/L (ref 23–33)
CREAT SERPL-MCNC: 1.1 MG/DL (ref 0.4–1.2)
EOSINOPHIL # BLD: 1.4 %
EOSINOPHILS ABSOLUTE: 0.1 THOU/MM3 (ref 0–0.4)
ERYTHROCYTE [DISTWIDTH] IN BLOOD BY AUTOMATED COUNT: 15.3 % (ref 11.5–14.5)
ERYTHROCYTE [DISTWIDTH] IN BLOOD BY AUTOMATED COUNT: 52.2 FL (ref 35–45)
GFR SERPL CREATININE-BSD FRML MDRD: 48 ML/MIN/1.73M2
GLUCOSE BLD-MCNC: 107 MG/DL (ref 70–108)
HBA1C MFR BLD: 5.9 % (ref 4.4–6.4)
HCT VFR BLD CALC: 40.1 % (ref 37–47)
HDLC SERPL-MCNC: 49 MG/DL
HEMOGLOBIN: 12.7 GM/DL (ref 12–16)
IMMATURE GRANS (ABS): 0.02 THOU/MM3 (ref 0–0.07)
IMMATURE GRANULOCYTES: 0.2 %
LDL CHOLESTEROL CALCULATED: 98 MG/DL
LYMPHOCYTES # BLD: 33.7 %
LYMPHOCYTES ABSOLUTE: 3.3 THOU/MM3 (ref 1–4.8)
MCH RBC QN AUTO: 29.5 PG (ref 26–33)
MCHC RBC AUTO-ENTMCNC: 31.7 GM/DL (ref 32.2–35.5)
MCV RBC AUTO: 93.3 FL (ref 81–99)
MONOCYTES # BLD: 8.4 %
MONOCYTES ABSOLUTE: 0.8 THOU/MM3 (ref 0.4–1.3)
NUCLEATED RED BLOOD CELLS: 0 /100 WBC
PLATELET # BLD: 241 THOU/MM3 (ref 130–400)
PMV BLD AUTO: 11.6 FL (ref 9.4–12.4)
POTASSIUM SERPL-SCNC: 3.9 MEQ/L (ref 3.5–5.2)
RBC # BLD: 4.3 MILL/MM3 (ref 4.2–5.4)
SEG NEUTROPHILS: 55.7 %
SEGMENTED NEUTROPHILS ABSOLUTE COUNT: 5.5 THOU/MM3 (ref 1.8–7.7)
SODIUM BLD-SCNC: 140 MEQ/L (ref 135–145)
TOTAL PROTEIN: 6.6 G/DL (ref 6.1–8)
TRIGL SERPL-MCNC: 167 MG/DL (ref 0–199)
WBC # BLD: 9.9 THOU/MM3 (ref 4.8–10.8)

## 2022-07-13 PROCEDURE — 99397 PER PM REEVAL EST PAT 65+ YR: CPT | Performed by: FAMILY MEDICINE

## 2022-07-13 RX ORDER — OMEPRAZOLE 40 MG/1
CAPSULE, DELAYED RELEASE ORAL
Qty: 90 CAPSULE | Refills: 3 | Status: SHIPPED | OUTPATIENT
Start: 2022-07-13

## 2022-07-13 RX ORDER — CITALOPRAM 20 MG/1
20 TABLET ORAL 2 TIMES DAILY
Qty: 60 TABLET | Refills: 11 | Status: SHIPPED | OUTPATIENT
Start: 2022-07-13

## 2022-07-13 RX ORDER — NYSTATIN 100000 [USP'U]/G
POWDER TOPICAL
Qty: 180 G | Refills: 3 | Status: SHIPPED | OUTPATIENT
Start: 2022-07-13

## 2022-07-13 RX ORDER — FAMOTIDINE 20 MG/1
20 TABLET, FILM COATED ORAL 2 TIMES DAILY
Qty: 180 TABLET | Refills: 3 | Status: SHIPPED | OUTPATIENT
Start: 2022-07-13

## 2022-07-13 RX ORDER — PROPRANOLOL HYDROCHLORIDE 120 MG/1
CAPSULE, EXTENDED RELEASE ORAL
Qty: 90 CAPSULE | Refills: 3 | Status: SHIPPED | OUTPATIENT
Start: 2022-07-13

## 2022-07-13 RX ORDER — BUDESONIDE AND FORMOTEROL FUMARATE DIHYDRATE 160; 4.5 UG/1; UG/1
AEROSOL RESPIRATORY (INHALATION)
Qty: 3 EACH | Refills: 3 | Status: SHIPPED | OUTPATIENT
Start: 2022-07-13

## 2022-07-13 RX ORDER — SIMVASTATIN 40 MG
TABLET ORAL
Qty: 90 TABLET | Refills: 3 | Status: SHIPPED | OUTPATIENT
Start: 2022-07-13

## 2022-07-13 RX ORDER — BUMETANIDE 0.5 MG/1
TABLET ORAL
Qty: 90 TABLET | Refills: 3 | Status: SHIPPED | OUTPATIENT
Start: 2022-07-13

## 2022-07-13 RX ORDER — DOXEPIN HYDROCHLORIDE 10 MG/1
10 CAPSULE ORAL NIGHTLY
Qty: 30 CAPSULE | Refills: 11 | Status: SHIPPED | OUTPATIENT
Start: 2022-07-13

## 2022-07-13 SDOH — ECONOMIC STABILITY: FOOD INSECURITY: WITHIN THE PAST 12 MONTHS, THE FOOD YOU BOUGHT JUST DIDN'T LAST AND YOU DIDN'T HAVE MONEY TO GET MORE.: NEVER TRUE

## 2022-07-13 SDOH — ECONOMIC STABILITY: FOOD INSECURITY: WITHIN THE PAST 12 MONTHS, YOU WORRIED THAT YOUR FOOD WOULD RUN OUT BEFORE YOU GOT MONEY TO BUY MORE.: NEVER TRUE

## 2022-07-13 ASSESSMENT — ENCOUNTER SYMPTOMS
SHORTNESS OF BREATH: 0
WHEEZING: 0
SORE THROAT: 0
RHINORRHEA: 0
NAUSEA: 0
BACK PAIN: 0
CONSTIPATION: 0
VOMITING: 0
BLOOD IN STOOL: 0
DIARRHEA: 0
COUGH: 0
ABDOMINAL PAIN: 0
EYE PAIN: 0
CHEST TIGHTNESS: 0

## 2022-07-13 ASSESSMENT — SOCIAL DETERMINANTS OF HEALTH (SDOH): HOW HARD IS IT FOR YOU TO PAY FOR THE VERY BASICS LIKE FOOD, HOUSING, MEDICAL CARE, AND HEATING?: NOT HARD AT ALL

## 2022-07-13 NOTE — PROGRESS NOTES
2022    Raissa Coronel (:  1945) is a 69 y.o. female, here for a preventive medicine evaluation. Patient Active Problem List   Diagnosis    Pharyngoesophageal dysphagia    Hoarseness    History of renal cell cancer       Review of Systems   Constitutional: Negative for chills, fatigue, fever and unexpected weight change. HENT: Negative for congestion, ear pain, rhinorrhea and sore throat. Eyes: Negative for pain and visual disturbance. Respiratory: Negative for cough, chest tightness, shortness of breath and wheezing. Cardiovascular: Negative for chest pain and palpitations. Gastrointestinal: Negative for abdominal pain, blood in stool, constipation, diarrhea, nausea and vomiting. Genitourinary: Negative for difficulty urinating, frequency, hematuria and urgency. Musculoskeletal: Negative for back pain, joint swelling, myalgias and neck pain. Skin: Negative for rash. Neurological: Negative for dizziness and headaches. Hematological: Negative for adenopathy. Does not bruise/bleed easily. Psychiatric/Behavioral: Negative for behavioral problems and sleep disturbance. The patient is not nervous/anxious. Prior to Visit Medications    Medication Sig Taking? Authorizing Provider   doxepin (SINEQUAN) 10 MG capsule Take 1 capsule by mouth nightly Yes Fidelina Bob MD   citalopram (CELEXA) 20 MG tablet Take 1 tablet by mouth 2 times daily Yes Fidelina Bob MD   VENTOLIN  (90 Base) MCG/ACT inhaler INHALE 2 PUFFS BY MOUTH 4 TIMES A DAY AS NEEDED Yes Fidelina Bob MD   nystatin (MYCOSTATIN) 559364 UNIT/GM powder Apply 3 times daily.  Yes Fidelina Bob MD   propranolol (INDERAL LA) 120 MG extended release capsule TAKE 1 CAPSULE BY MOUTH EVERY DAY Yes Fidelina Bob MD   omeprazole (PRILOSEC) 40 MG delayed release capsule TAKE 1 CAPSULE BY MOUTH EVERY DAY Yes Fidelina Bob MD   simvastatin (ZOCOR) 40 MG tablet TAKE 1 TABLET BY MOUTH EVERY DAY AT NIGHT Yes Judith Potter MD   famotidine (PEPCID) 20 MG tablet Take 1 tablet by mouth 2 times daily Yes Judith Potter MD   budesonide-formoterol (SYMBICORT) 160-4.5 MCG/ACT AERO TAKE 2 PUFFS BY MOUTH TWICE A DAY Yes Judith Potter MD   bumetanide (BUMEX) 0.5 MG tablet TAKE 1 TABLET BY MOUTH EVERY DAY Yes Judith Potter MD   triamcinolone (KENALOG) 0.1 % cream Apply topically 2 times daily Apply topically 2 times daily. Yes Historical Provider, MD   fluconazole (DIFLUCAN) 100 MG tablet Take 1 tablet by mouth daily Yes Judith Potter MD   solifenacin (VESICARE) 5 MG tablet TAKE 1 TABLET BY MOUTH EVERY DAY Yes Marina Barnett PA-C   LORazepam (ATIVAN) 0.5 MG tablet Take 0.5 mg by mouth as needed for Anxiety. Yes Historical Provider, MD   nitroGLYCERIN (NITROSTAT) 0.4 MG SL tablet Place 1 tablet under the tongue every 5 minutes as needed for Chest pain Yes Yoandy Olivera MD   Probiotic Product ([employer] PROBIOTIC) CAPS Take by mouth Yes Historical Provider, MD   oxybutynin (DITROPAN-XL) 5 MG extended release tablet Take 5 mg by mouth daily Yes Historical Provider, MD   diclofenac sodium 1 % GEL Apply 2 g topically 4 times daily Yes Jeffry Nieto MD   acetaminophen (TYLENOL) 500 MG tablet Take 500 mg by mouth every 6 hours as needed for Pain Yes Historical Provider, MD   Cholecalciferol (VITAMIN D3) 5000 units TABS Take by mouth daily  Yes Historical Provider, MD   aspirin 81 MG tablet Take 81 mg by mouth daily Yes Historical Provider, MD   fexofenadine (ALLEGRA) 180 MG tablet Take 180 mg by mouth daily. Yes Historical Provider, MD   Multiple Vitamin (MULTI-VITAMINS PO) Take  by mouth daily. Yes Historical Provider, MD        Allergies   Allergen Reactions    Latex Hives     Patient noted in preop that she might be allergic - maybe breaking out when exposed.     Demerol Hcl [Meperidine] Nausea And Vomiting     confusion    Propoxyphene     Tape [place] Tape]        Past Medical Stress:     Feeling of Stress : Not on file   Social Connections:     Frequency of Communication with Friends and Family: Not on file    Frequency of Social Gatherings with Friends and Family: Not on file    Attends Latter day Services: Not on file    Active Member of "[employer]" Group or Organizations: Not on file    Attends Club or Organization Meetings: Not on file    Marital Status: Not on file   Intimate Partner Violence:     Fear of Current or Ex-Partner: Not on file    Emotionally Abused: Not on file    Physically Abused: Not on file    Sexually Abused: Not on file   Housing Stability:     Unable to Pay for Housing in the Last Year: Not on file    Number of [place] in the Last Year: Not on file    Unstable Housing in the Last Year: Not on file        Family History   Problem Relation Age of Onset    Heart Disease Mother     High Blood Pressure Mother     High Cholesterol Mother     Stroke Mother     Breast Cancer Mother 76    Cancer Father     Heart Disease Father     High Blood Pressure Father     High Cholesterol Father     Breast Cancer Sister 79       ADVANCE DIRECTIVE: N, <no information>    Vitals:    07/13/22 1021   BP: 124/76   Pulse: 77   Resp: 18   Temp: 97.2 °F (36.2 °C)   TempSrc: Infrared   SpO2: 94%   Weight: 211 lb 12.8 oz (96.1 kg)     Estimated body mass index is 35.25 kg/m² as calculated from the following:    Height as of 3/17/22: 5' 5\" (1.651 m). Weight as of this encounter: 211 lb 12.8 oz (96.1 kg). Physical Exam  Vitals and nursing note reviewed. Constitutional:       Appearance: She is well-developed. HENT:      Head: Normocephalic and atraumatic. Right Ear: External ear normal.      Left Ear: External ear normal.      Nose: Nose normal.      Mouth/Throat:      Mouth: Mucous membranes are moist.   Eyes:      Pupils: Pupils are equal, round, and reactive to light. Neck:      Thyroid: No thyromegaly. Vascular: No carotid bruit.    Cardiovascular: Rate and Rhythm: Normal rate and regular rhythm. Heart sounds: Normal heart sounds. Pulmonary:      Breath sounds: Normal breath sounds. No wheezing or rales. Abdominal:      General: Bowel sounds are normal.      Palpations: Abdomen is soft. Tenderness: There is no abdominal tenderness. There is no guarding or rebound. Musculoskeletal:         General: Normal range of motion. Cervical back: Neck supple. Lymphadenopathy:      Cervical: No cervical adenopathy. Skin:     General: Skin is warm and dry. Findings: No rash. Neurological:      Mental Status: She is alert and oriented to person, place, and time. Cranial Nerves: No cranial nerve deficit. Deep Tendon Reflexes: Reflexes are normal and symmetric. No flowsheet data found.     Lab Results   Component Value Date/Time    CHOL 185 06/29/2021 02:41 PM    CHOL 183 06/10/2020 11:38 AM    CHOL 183 07/12/2019 11:55 AM    CHOL 187 07/24/2018 11:35 AM    TRIG 146 06/29/2021 02:41 PM    TRIG 150 06/10/2020 11:38 AM    TRIG 156 07/12/2019 11:55 AM    HDL 58 06/29/2021 02:41 PM    HDL 55 06/10/2020 11:38 AM    HDL 56 07/12/2019 11:55 AM    LDLCALC 98 06/29/2021 02:41 PM    LDLCALC 98 06/10/2020 11:38 AM    LDLCALC 96 07/12/2019 11:55 AM    GLUCOSE 154 07/13/2021 10:44 PM    GLUCOSE 118 07/12/2019 11:55 AM    LABA1C 5.9 06/29/2021 02:41 PM    LABA1C 5.8 01/21/2021 12:00 AM    LABA1C 6.2 06/10/2020 11:38 AM       The 10-year ASCVD risk score (Paola Gamez et al., 2013) is: 30%    Values used to calculate the score:      Age: 69 years      Sex: Female      Is Non- : No      Diabetic: Yes      Tobacco smoker: No      Systolic Blood Pressure: 465 mmHg      Is BP treated: No      HDL Cholesterol: 58 mg/dL      Total Cholesterol: 185 mg/dL    Immunization History   Administered Date(s) Administered    COVID-19, MODERNA BLUE border, Primary or Immunocompromised, (age 12y+), IM, 100 mcg/0.5mL 03/08/2021, MG extended release capsule; TAKE 1 CAPSULE BY MOUTH EVERY DAY, Disp-90 capsule, R-3Normal  -     CBC with Auto Differential; Future  -     Comprehensive Metabolic Panel; Future  Gastroesophageal reflux disease, unspecified whether esophagitis present  -     omeprazole (PRILOSEC) 40 MG delayed release capsule; TAKE 1 CAPSULE BY MOUTH EVERY DAY, Disp-90 capsule, R-3Normal  -     famotidine (PEPCID) 20 MG tablet; Take 1 tablet by mouth 2 times daily, Disp-180 tablet, R-3Normal  Hypercholesterolemia  -     simvastatin (ZOCOR) 40 MG tablet; TAKE 1 TABLET BY MOUTH EVERY DAY AT NIGHT, Disp-90 tablet, R-3Normal  -     Lipid Panel; Future  -     CBC with Auto Differential; Future  -     Comprehensive Metabolic Panel; Future  Peripheral edema  -     bumetanide (BUMEX) 0.5 MG tablet; TAKE 1 TABLET BY MOUTH EVERY DAY, Disp-90 tablet, R-3Normal  Hyperglycemia  -     Hemoglobin A1C; Future  Encouraged diet, exercise and weight loss. Reviewed health maintenance    No follow-ups on file.          --Amanda Rojas MD

## 2022-07-13 NOTE — PATIENT INSTRUCTIONS
family and community. If you find you often feel sad or hopeless, talk with your doctor. Treatment can help.  Talk to your doctor about whether you have any risk factors for sexually transmitted infections (STIs). You can help prevent STIs if you wait to have sex with a new partner (or partners) until you've each been tested for STIs. It also helps if you use condoms (male or female condoms) and if you limit your sex partners to one person who only has sex with you. Vaccines are available for some STIs.  If you think you may have a problem with alcohol or drug use, talk to your doctor. This includes prescription medicines (such as amphetamines and opioids) and illegal drugs (such as cocaine and methamphetamine). Your doctor can help you figure out what type of treatment is best for you.  Protect your skin from too much sun. When you're outdoors from 10 a.m. to 4 p.m., stay in the shade or cover up with clothing and a hat with a wide brim. Wear sunglasses that block UV rays. Even when it's cloudy, put broad-spectrum sunscreen (SPF 30 or higher) on any exposed skin.  See a dentist one or two times a year for checkups and to have your teeth cleaned.  Wear a seat belt in the car. When should you call for help? Watch closely for changes in your health, and be sure to contact your doctor if you have any problems or symptoms that concern you. Where can you learn more? Go to [email or web address] org and sign in to your [employer] account. Enter B236 in the [employer] box to learn more about \"Well Visit, Over 65: Care Instructions. \"     If you do not have an account, please click on the \"Sign Up Now\" link. Current as of: October 6, 2021               Content Version: 13.3  © 2006-2022 Healthwise, Incorporated. Care instructions adapted under license by [facility] ([facility]).  If you have questions about a medical condition or this instruction, always ask your healthcare professional. [employer] disclaims any warranty or liability for your use of this information.

## 2022-07-14 ENCOUNTER — TELEPHONE (OUTPATIENT)
Dept: FAMILY MEDICINE CLINIC | Age: 77
End: 2022-07-14

## 2022-07-14 DIAGNOSIS — N28.9 DECREASED RENAL FUNCTION: Primary | ICD-10-CM

## 2022-07-27 ENCOUNTER — NURSE ONLY (OUTPATIENT)
Dept: FAMILY MEDICINE CLINIC | Age: 77
End: 2022-07-27
Payer: MEDICARE

## 2022-07-27 DIAGNOSIS — N28.9 DECREASED RENAL FUNCTION: ICD-10-CM

## 2022-07-27 LAB
ANION GAP SERPL CALCULATED.3IONS-SCNC: 13 MEQ/L (ref 8–16)
BUN BLDV-MCNC: 9 MG/DL (ref 7–22)
CALCIUM SERPL-MCNC: 8.7 MG/DL (ref 8.5–10.5)
CHLORIDE BLD-SCNC: 100 MEQ/L (ref 98–111)
CO2: 29 MEQ/L (ref 23–33)
CREAT SERPL-MCNC: 0.9 MG/DL (ref 0.4–1.2)
GFR SERPL CREATININE-BSD FRML MDRD: 61 ML/MIN/1.73M2
GLUCOSE BLD-MCNC: 113 MG/DL (ref 70–108)
POTASSIUM SERPL-SCNC: 4.4 MEQ/L (ref 3.5–5.2)
SODIUM BLD-SCNC: 142 MEQ/L (ref 135–145)

## 2022-07-27 PROCEDURE — 36415 COLL VENOUS BLD VENIPUNCTURE: CPT | Performed by: FAMILY MEDICINE

## 2022-07-28 ENCOUNTER — TELEPHONE (OUTPATIENT)
Dept: FAMILY MEDICINE CLINIC | Age: 77
End: 2022-07-28

## 2022-07-28 NOTE — TELEPHONE ENCOUNTER
----- Message from Octavio Segovia MD sent at 7/28/2022  6:53 AM EDT -----  BMP is is improved. Kidney function is better. Continue present.

## 2022-10-10 ENCOUNTER — HOSPITAL ENCOUNTER (OUTPATIENT)
Dept: GENERAL RADIOLOGY | Age: 77
Discharge: HOME | End: 2022-10-10
Payer: MEDICARE

## 2022-10-10 ENCOUNTER — NURSE ONLY (OUTPATIENT)
Dept: LAB | Age: 77
End: 2022-10-10

## 2022-10-10 ENCOUNTER — HOSPITAL ENCOUNTER (OUTPATIENT)
Dept: ULTRASOUND IMAGING | Age: 77
Discharge: HOME | End: 2022-10-10
Payer: MEDICARE

## 2022-10-10 DIAGNOSIS — C64.2 RENAL CELL CARCINOMA OF LEFT KIDNEY (HCC): ICD-10-CM

## 2022-10-10 LAB
ANION GAP SERPL CALCULATED.3IONS-SCNC: 14 MEQ/L (ref 8–16)
BASOPHILS # BLD: 0.5 %
BASOPHILS ABSOLUTE: 0.1 THOU/MM3 (ref 0–0.1)
BUN BLDV-MCNC: 14 MG/DL (ref 7–22)
CALCIUM SERPL-MCNC: 9 MG/DL (ref 8.5–10.5)
CHLORIDE BLD-SCNC: 100 MEQ/L (ref 98–111)
CO2: 26 MEQ/L (ref 23–33)
CREAT SERPL-MCNC: 1 MG/DL (ref 0.4–1.2)
EOSINOPHIL # BLD: 1.3 %
EOSINOPHILS ABSOLUTE: 0.1 THOU/MM3 (ref 0–0.4)
ERYTHROCYTE [DISTWIDTH] IN BLOOD BY AUTOMATED COUNT: 14.4 % (ref 11.5–14.5)
ERYTHROCYTE [DISTWIDTH] IN BLOOD BY AUTOMATED COUNT: 48.6 FL (ref 35–45)
GFR SERPL CREATININE-BSD FRML MDRD: 54 ML/MIN/1.73M2
GLUCOSE BLD-MCNC: 127 MG/DL (ref 70–108)
HCT VFR BLD CALC: 38.6 % (ref 37–47)
HEMOGLOBIN: 12.6 GM/DL (ref 12–16)
IMMATURE GRANS (ABS): 0.03 THOU/MM3 (ref 0–0.07)
IMMATURE GRANULOCYTES: 0.3 %
LYMPHOCYTES # BLD: 23.8 %
LYMPHOCYTES ABSOLUTE: 2.6 THOU/MM3 (ref 1–4.8)
MCH RBC QN AUTO: 30.1 PG (ref 26–33)
MCHC RBC AUTO-ENTMCNC: 32.6 GM/DL (ref 32.2–35.5)
MCV RBC AUTO: 92.3 FL (ref 81–99)
MONOCYTES # BLD: 7.6 %
MONOCYTES ABSOLUTE: 0.8 THOU/MM3 (ref 0.4–1.3)
NUCLEATED RED BLOOD CELLS: 0 /100 WBC
PLATELET # BLD: 299 THOU/MM3 (ref 130–400)
PMV BLD AUTO: 11.5 FL (ref 9.4–12.4)
POTASSIUM SERPL-SCNC: 3.7 MEQ/L (ref 3.5–5.2)
RBC # BLD: 4.18 MILL/MM3 (ref 4.2–5.4)
SEG NEUTROPHILS: 66.5 %
SEGMENTED NEUTROPHILS ABSOLUTE COUNT: 7.3 THOU/MM3 (ref 1.8–7.7)
SODIUM BLD-SCNC: 140 MEQ/L (ref 135–145)
WBC # BLD: 11 THOU/MM3 (ref 4.8–10.8)

## 2022-10-10 PROCEDURE — 76770 US EXAM ABDO BACK WALL COMP: CPT

## 2022-10-10 PROCEDURE — 71046 X-RAY EXAM CHEST 2 VIEWS: CPT

## 2022-10-16 NOTE — PROGRESS NOTES
[facility] [address] [facility] [id number]  Dept: [phone]  Loc: [phone]      Ms. Teresa Mejia was seen in follow up for   Chief Complaint   Patient presents with    Urinary Frequency    Urinary Urgency        HPI:  Ms. 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 Her serial imaging and labs have been negative for metastatic disease. She did undergo an office cystoscopy with Dr. Meredith Villa in January 2019 for an elevated Bladder Cx of 0.24. Her cystoscopy was unremarkable. Repeat testing in May 2019 was within normal limits at 0.11. Her cytoscopy was found to be normal.  She denies HA, chest pain, fever/chills, night sweats, irregular bone pain, N/V, leg pain, hemoptysis, and lightheadedness. In regards to her urinary health, she denies dysuria, hematuria, weakened stream, urinary retention, or flank pain. She reports frequency and urgency but states that the Vesicare 5 mg daily is improving her symptoms. She is having regular bowel movements and her appetite is normal. She presents today for further evaluation.     Past Medical History:   Diagnosis Date    Anxiety     Arthritis     Asthma     Cancer ([address]     Depression     Diabetes mellitus ([address]     Headache(784.0)     Hyperlipidemia     Hypertension     Nausea & vomiting     Persistent proteinuria associated with type 2 diabetes mellitus ([address]     Renal cell carcinoma of left kidney ([facility] 12/6/2014    Unspecified cerebral artery occlusion with cerebral infarction        Past Surgical History:   Procedure Laterality Date    CARDIAC CATHETERIZATION      CHOLECYSTECTOMY      COLONOSCOPY      ENDOSCOPY, COLON, DIAGNOSTIC      FINGER SURGERY  tumor    HERNIA REPAIR      HIATAL HERNIA REPAIR  7-    stomach repair    GUILLE STEROTACTIC LOC BREAST BIOPSY RIGHT Right 2000    benign    PARTIAL NEPHRECTOMY Left 11/21/2014    robotic left partial nephrectomy    TONSILLECTOMY      TUBAL LIGATION         Current Outpatient Medications on File Prior to Visit   Medication Sig Dispense Refill    doxepin (SINEQUAN) 10 MG capsule Take 1 capsule by mouth nightly 30 capsule 11    citalopram (CELEXA) 20 MG tablet Take 1 tablet by mouth 2 times daily 60 tablet 11    VENTOLIN  (90 Base) MCG/ACT inhaler INHALE 2 PUFFS BY MOUTH 4 TIMES A DAY AS NEEDED 3 each 3    nystatin (MYCOSTATIN) 949057 UNIT/GM powder Apply 3 times daily. 180 g 3    propranolol (INDERAL LA) 120 MG extended release capsule TAKE 1 CAPSULE BY MOUTH EVERY DAY 90 capsule 3    omeprazole (PRILOSEC) 40 MG delayed release capsule TAKE 1 CAPSULE BY MOUTH EVERY DAY 90 capsule 3    simvastatin (ZOCOR) 40 MG tablet TAKE 1 TABLET BY MOUTH EVERY DAY AT NIGHT 90 tablet 3    famotidine (PEPCID) 20 MG tablet Take 1 tablet by mouth 2 times daily 180 tablet 3    budesonide-formoterol (SYMBICORT) 160-4.5 MCG/ACT AERO TAKE 2 PUFFS BY MOUTH TWICE A DAY 3 each 3    bumetanide (BUMEX) 0.5 MG tablet TAKE 1 TABLET BY MOUTH EVERY DAY 90 tablet 3    triamcinolone (KENALOG) 0.1 % cream Apply topically 2 times daily Apply topically 2 times daily. solifenacin (VESICARE) 5 MG tablet TAKE 1 TABLET BY MOUTH EVERY DAY 90 tablet 3    LORazepam (ATIVAN) 0.5 MG tablet Take 0.5 mg by mouth as needed for Anxiety.        nitroGLYCERIN (NITROSTAT) 0.4 MG SL tablet Place 1 tablet under the tongue every 5 minutes as needed for Chest pain 25 tablet 1    Probiotic Product ([employer] PROBIOTIC) CAPS Take by mouth      diclofenac sodium 1 % GEL Apply 2 g topically 4 times daily 2 Tube 1    acetaminophen (TYLENOL) 500 MG tablet Take 500 mg by mouth every 6 hours as needed for Pain      Cholecalciferol (VITAMIN D3) 5000 units TABS Take by mouth daily       aspirin 81 MG tablet Take 81 mg by mouth daily      fexofenadine (ALLEGRA) 180 MG tablet Take 180 mg by mouth daily. Multiple Vitamin (MULTI-VITAMINS PO) Take  by mouth daily. Current Facility-Administered Medications on File Prior to Visit   Medication Dose Route Frequency Provider Last Rate Last Admin    cefOXitin (MEFOXIN) 2 g in dextrose 5% 50 mL (mini-bag)  2 g IntraVENous Once Sreekanth Ortiz MD           Allergies   Allergen Reactions    Latex Hives     Patient noted in preop that she might be allergic - maybe breaking out when exposed.     Demerol Hcl [Meperidine] Nausea And Vomiting     confusion    Propoxyphene     Tape [Adhesive Tape]        Family History   Problem Relation Age of Onset    Heart Disease Mother     High Blood Pressure Mother     High Cholesterol Mother     Stroke Mother     Breast Cancer Mother 76    Cancer Father     Heart Disease Father     High Blood Pressure Father     High Cholesterol Father     Breast Cancer Sister 79       Social History     Socioeconomic History    Marital status:      Spouse name: Not on file    Number of children: Not on file    Years of education: Not on file    Highest education level: Not on file   Occupational History    Not on file   Tobacco Use    Smoking status: Never    Smokeless tobacco: Never   Vaping Use    Vaping Use: Never used   Substance and Sexual Activity    Alcohol use: No    Drug use: No    Sexual activity: Not on file   Other Topics Concern    Not on file   Social History Narrative    Not on file     Social Determinants of Health     Financial Resource Strain: Low Risk     Difficulty of Paying Living Expenses: Not hard at all   Food Insecurity: No Food Insecurity    Worried About Running Out of Food in the Last Year: Never true    Ran Out of Food in the Last Year: Never true   Transportation Needs: Not on file   Physical Activity: Not on file   Stress: Not on file   Social Connections: Not on file   Intimate Partner Violence: Not on file   Housing Stability: Not on file       Review of Systems  Constitutional: Negative for fatigue, fever and unexpected weight change. HENT: Negative for congestion and trouble swallowing. Eyes: Negative for pain and itching. Respiratory: Negative for cough and shortness of breath. Cardiovascular: Negative for chest pain and leg swelling. Gastrointestinal: Negative for abdominal pain, constipation, diarrhea and nausea. Endocrine: Negative for cold intolerance and heat intolerance. Genitourinary: See HPI. Musculoskeletal:  Negative for back pain and joint swelling. Skin: Negative for rash. Neurological: Negative for dizziness, weakness, numbness and headaches. Psychiatric/Behavioral: The patient is not nervous/anxious. Exam    BP (!) 140/84   Ht 5' 5\" (1.651 m)   Wt 213 lb (96.6 kg)   BMI 35.45 kg/m²     Constitutional: Oriented to person, place, and time. Vital signs are normal. Appears well-developed and well-nourished. cooperative. No distress. HENT:    Head: Normocephalic and atraumatic. Eyes: EOM are normal. Pupils are equal, round, and reactive to light. Right eye exhibits no discharge. Left eye exhibits no discharge. No scleral icterus. Neck: Trachea normal. No JVD present. No obvious masses. Cardiovascular: Normal rate and regular rhythm. S1 and S2 normal.  Pulmonary/Chest: Effort normal. No respiratory distress. No wheezes, rhonchi, or rales. Abdominal: Soft. Exhibits no distension. There is no generalized tenderness. There is no rebound, guarding, or rigidity. No CVA tenderness. Musculoskeletal: No pitting edema or calf tenderness. Lymphadenopathy: Negative  Neurological: Alert and oriented to person, place, and time. No cranial nerve deficit. Skin: Skin is warm and dry. Not diaphoretic. Psychiatric: Normal mood and affect. Behavior is normal.   Nursing note and vitals reviewed.     Labs    Results for POC orders placed in visit on 10/17/22   poct post void residual   Result Value Ref Range    post void residual 157n ml       Lab Results   Component Value Date    CREATININE 1.0 10/10/2022    BUN 14 10/10/2022     10/10/2022    K 3.7 10/10/2022     10/10/2022    CO2 26 10/10/2022     Renal US: October 2022    FINDINGS: The right kidney measures 9.5 x 5.0 x 5.5 cm and the left kidney measures 9.4 x 4.3 x 4.2 cm. Renal cortical thickness and echogenicity are normal bilaterally. There is no hydronephrosis, calculi or intrarenal mass on either side. Color Doppler demonstrates expected wave forms in the bilateral renal arteries. Arcuate resistive indices are at the upper limits of normal bilaterally. The distended urinary bladder is unremarkable. There is a moderate amount of postvoid residual urine in the bladder with a post void volume of 43 mL. Impression   1. Normal bilateral renal ultrasound. 2. Moderate amount of postvoid residual urine but otherwise unremarkable urinary bladder. CXR (10/22)    Mild cardiomegaly. Lungs are somewhat hyperinflated. Cannot exclude COPD. 2. Mild fibrotic stranding in the right middle lobe. 3. No acute findings. No infiltrates or effusions are seen. Assessment/Plan:    1. 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 October 2022 Renal US demonstrates no disease recurrence. Plan renal US and CXR next year per patient request. Call if any questions or concerns. One year follow-up. 2. History of diverticulosis- Patient follows with Dr. Sigifredo Hernandes. 3. Microscopic hematuria/Abnormal Bladder Cx- Last Bladder Cx in October 2020 was normal at 0.09. Patient underable to give urine sample today. 4. Left superficial cervical/supraclavicular lymphadenopathy- Resolved. 5. Urinary urgency/frequency- Improved with Vesicare 5 mg daily. She would like to continue current dose.  ml today.  She refused straight catheterization for more accurate value. Recent renal US recorded a residual of 43 mL. PVR at next visit.

## 2022-10-17 ENCOUNTER — OFFICE VISIT (OUTPATIENT)
Dept: UROLOGY | Age: 77
End: 2022-10-17
Payer: MEDICARE

## 2022-10-17 VITALS
BODY MASS INDEX: 35.49 KG/M2 | DIASTOLIC BLOOD PRESSURE: 84 MMHG | HEIGHT: 65 IN | WEIGHT: 213 LBS | SYSTOLIC BLOOD PRESSURE: 140 MMHG

## 2022-10-17 DIAGNOSIS — C64.9 RENAL CELL CARCINOMA, UNSPECIFIED LATERALITY (HCC): ICD-10-CM

## 2022-10-17 DIAGNOSIS — N32.81 OAB (OVERACTIVE BLADDER): Primary | ICD-10-CM

## 2022-10-17 LAB — POST VOID RESIDUAL (PVR): NORMAL ML

## 2022-10-17 PROCEDURE — 99213 OFFICE O/P EST LOW 20 MIN: CPT | Performed by: PHYSICIAN ASSISTANT

## 2022-10-17 PROCEDURE — 1123F ACP DISCUSS/DSCN MKR DOCD: CPT | Performed by: PHYSICIAN ASSISTANT

## 2022-10-17 PROCEDURE — 51798 US URINE CAPACITY MEASURE: CPT | Performed by: PHYSICIAN ASSISTANT

## 2022-11-12 DIAGNOSIS — L30.4 INTERTRIGO: ICD-10-CM

## 2022-11-12 DIAGNOSIS — B35.6 TINEA CRURIS: ICD-10-CM

## 2022-11-14 RX ORDER — FLUCONAZOLE 100 MG/1
TABLET ORAL
Qty: 30 TABLET | Refills: 1 | OUTPATIENT
Start: 2022-11-14

## 2022-11-29 DIAGNOSIS — B35.6 TINEA CRURIS: ICD-10-CM

## 2022-11-29 DIAGNOSIS — L30.4 INTERTRIGO: ICD-10-CM

## 2022-11-29 DIAGNOSIS — J45.41 MODERATE PERSISTENT ASTHMA WITH EXACERBATION: ICD-10-CM

## 2022-11-30 RX ORDER — FLUCONAZOLE 100 MG/1
TABLET ORAL
Qty: 30 TABLET | Refills: 1 | OUTPATIENT
Start: 2022-11-30

## 2022-11-30 NOTE — TELEPHONE ENCOUNTER
Lisbeth Weinberg called requesting a refill on the following medications:  Requested Prescriptions     Pending Prescriptions Disp Refills    fluconazole (DIFLUCAN) 100 MG tablet [Pharmacy Med Name: FLUCONAZOLE 100 MG TABLET] 30 tablet 1     Sig: TAKE 1 TABLET BY MOUTH EVERY DAY    ipratropium (ATROVENT) 0.02 % nebulizer solution [Pharmacy Med Name: IPRATROPIUM BR 0.02% SOLN] 250 mL 3     Sig: TAKE 2.5 MLS BY NEBULIZATION 4 TIMES DAILY       Date of last visit: 7/13/2022  Date of next visit (if applicable):Visit date not found  Date of last refill: 6/8/22 30 and 1 refills, 6/29/2021  Pharmacy Name: [facility], [address] [place] [address]

## 2023-01-11 ENCOUNTER — OFFICE VISIT (OUTPATIENT)
Dept: FAMILY MEDICINE CLINIC | Age: 78
End: 2023-01-11
Payer: MEDICARE

## 2023-01-11 VITALS
HEART RATE: 72 BPM | SYSTOLIC BLOOD PRESSURE: 132 MMHG | RESPIRATION RATE: 18 BRPM | WEIGHT: 211.4 LBS | BODY MASS INDEX: 35.22 KG/M2 | DIASTOLIC BLOOD PRESSURE: 78 MMHG | HEIGHT: 65 IN

## 2023-01-11 DIAGNOSIS — N18.30 STAGE 3 CHRONIC KIDNEY DISEASE, UNSPECIFIED WHETHER STAGE 3A OR 3B CKD (HCC): ICD-10-CM

## 2023-01-11 DIAGNOSIS — E11.29 TYPE 2 DIABETES MELLITUS WITH MICROALBUMINURIA, WITHOUT LONG-TERM CURRENT USE OF INSULIN (HCC): ICD-10-CM

## 2023-01-11 DIAGNOSIS — Z00.00 MEDICARE ANNUAL WELLNESS VISIT, SUBSEQUENT: Primary | ICD-10-CM

## 2023-01-11 DIAGNOSIS — J01.90 ACUTE BACTERIAL SINUSITIS: ICD-10-CM

## 2023-01-11 DIAGNOSIS — R80.9 TYPE 2 DIABETES MELLITUS WITH MICROALBUMINURIA, WITHOUT LONG-TERM CURRENT USE OF INSULIN (HCC): ICD-10-CM

## 2023-01-11 DIAGNOSIS — E11.9 TYPE 2 DIABETES MELLITUS WITHOUT COMPLICATION, WITHOUT LONG-TERM CURRENT USE OF INSULIN (HCC): ICD-10-CM

## 2023-01-11 DIAGNOSIS — C64.9 RENAL CELL CARCINOMA, UNSPECIFIED LATERALITY (HCC): ICD-10-CM

## 2023-01-11 DIAGNOSIS — B96.89 ACUTE BACTERIAL SINUSITIS: ICD-10-CM

## 2023-01-11 PROCEDURE — G0439 PPPS, SUBSEQ VISIT: HCPCS | Performed by: FAMILY MEDICINE

## 2023-01-11 PROCEDURE — 1123F ACP DISCUSS/DSCN MKR DOCD: CPT | Performed by: FAMILY MEDICINE

## 2023-01-11 PROCEDURE — 99213 OFFICE O/P EST LOW 20 MIN: CPT | Performed by: FAMILY MEDICINE

## 2023-01-11 RX ORDER — AMOXICILLIN 500 MG/1
500 CAPSULE ORAL 3 TIMES DAILY
Qty: 30 CAPSULE | Refills: 0 | Status: SHIPPED | OUTPATIENT
Start: 2023-01-11 | End: 2023-01-21

## 2023-01-11 ASSESSMENT — ENCOUNTER SYMPTOMS
COUGH: 1
DIARRHEA: 0
RHINORRHEA: 0
ABDOMINAL PAIN: 0
SINUS PRESSURE: 1
SORE THROAT: 0
WHEEZING: 0
BLOOD IN STOOL: 0
BACK PAIN: 0
CONSTIPATION: 0
CHEST TIGHTNESS: 0
EYE PAIN: 0
NAUSEA: 0
VOMITING: 0
SHORTNESS OF BREATH: 0

## 2023-01-11 ASSESSMENT — LIFESTYLE VARIABLES: HOW OFTEN DO YOU HAVE A DRINK CONTAINING ALCOHOL: NEVER

## 2023-01-11 ASSESSMENT — PATIENT HEALTH QUESTIONNAIRE - PHQ9
SUM OF ALL RESPONSES TO PHQ QUESTIONS 1-9: 0
5. POOR APPETITE OR OVEREATING: 0
SUM OF ALL RESPONSES TO PHQ QUESTIONS 1-9: 0
4. FEELING TIRED OR HAVING LITTLE ENERGY: 0
9. THOUGHTS THAT YOU WOULD BE BETTER OFF DEAD, OR OF HURTING YOURSELF: 0
8. MOVING OR SPEAKING SO SLOWLY THAT OTHER PEOPLE COULD HAVE NOTICED. OR THE OPPOSITE, BEING SO FIGETY OR RESTLESS THAT YOU HAVE BEEN MOVING AROUND A LOT MORE THAN USUAL: 0
SUM OF ALL RESPONSES TO PHQ QUESTIONS 1-9: 0
7. TROUBLE CONCENTRATING ON THINGS, SUCH AS READING THE NEWSPAPER OR WATCHING TELEVISION: 0
6. FEELING BAD ABOUT YOURSELF - OR THAT YOU ARE A FAILURE OR HAVE LET YOURSELF OR YOUR FAMILY DOWN: 0
3. TROUBLE FALLING OR STAYING ASLEEP: 0
10. IF YOU CHECKED OFF ANY PROBLEMS, HOW DIFFICULT HAVE THESE PROBLEMS MADE IT FOR YOU TO DO YOUR WORK, TAKE CARE OF THINGS AT HOME, OR GET ALONG WITH OTHER PEOPLE: 0
1. LITTLE INTEREST OR PLEASURE IN DOING THINGS: 0
SUM OF ALL RESPONSES TO PHQ QUESTIONS 1-9: 0

## 2023-01-11 NOTE — PROGRESS NOTES
Medicare Annual Wellness Visit    Kit Ramos is here for Medicare AWV    Assessment & Plan   Medicare annual wellness visit, subsequent  Acute bacterial sinusitis  -     amoxicillin (AMOXIL) 500 MG capsule; Take 1 capsule by mouth 3 times daily for 10 days, Disp-30 capsule, R-0Normal  Type 2 diabetes mellitus with microalbuminuria, without long-term current use of insulin (HCC)  -stable, controlled on diet,   Hemoglobin A1C   Date Value Ref Range Status   07/13/2022 5.9 4.4 - 6.4 % Final       Type 2 diabetes mellitus without complication, without long-term current use of insulin (HCC)  Hemoglobin A1C   Date Value Ref Range Status   07/13/2022 5.9 4.4 - 6.4 % Final     Stage 3 chronic kidney disease, unspecified whether stage 3a or 3b CKD (HCC)  -stable, avoid nephrotoxic meds,   Lab Results   Component Value Date     10/10/2022    K 3.7 10/10/2022     10/10/2022    CO2 26 10/10/2022    BUN 14 10/10/2022    CREATININE 1.0 10/10/2022    GLUCOSE 127 (H) 10/10/2022    CALCIUM 9.0 10/10/2022    PROT 6.6 07/13/2022    LABALBU 3.9 07/13/2022    BILITOT 0.3 07/13/2022    ALKPHOS 124 07/13/2022    AST 11 07/13/2022    ALT 6 (L) 07/13/2022    LABGLOM 54 (A) 10/10/2022         Renal cell carcinoma, unspecified laterality (HCC)  -stable, follows with nephrology      Recommendations for Preventive Services Due: see orders and patient instructions/AVS.  Recommended screening schedule for the next 5-10 years is provided to the patient in written form: see Patient Instructions/AVS.     Return for Medicare Annual Wellness Visit in 1 year. Subjective       Patient's complete Health Risk Assessment and screening values have been reviewed and are found in Flowsheets. The following problems were reviewed today and where indicated follow up appointments were made and/or referrals ordered.     Positive Risk Factor Screenings with Interventions:    Fall Risk:  Do you feel unsteady or are you worried about falling? : no  2 or more falls in past year?: (!) yes  Fall with injury in past year?: no     Interventions:    See AVS for additional education material              Weight and Activity:  Physical Activity: Inactive    Days of Exercise per Week: 0 days    Minutes of Exercise per Session: 0 min     On average, how many days per week do you engage in moderate to strenuous exercise (like a brisk walk)?: 0 days  Have you lost any weight without trying in the past 3 months?: No  Body mass index: (!) 35.61    Inactivity Interventions:  See AVS for additional education material  Obesity Interventions:  See AVS for additional education material          Dentist Screen:  Have you seen the dentist within the past year?: (!) No    Intervention:  Advised to schedule with their dentist        Advanced Directives:  Do you have a Living Will?: (!) No    Intervention:  has NO advanced directive - information provided                       Objective   Vitals:    01/11/23 1428   BP: 132/78   Pulse: 72   Resp: 18   Weight: 211 lb 6.4 oz (95.9 kg)   Height: 5' 4.6\" (1.641 m)      Body mass index is 35.62 kg/m².       General Appearance: alert and oriented to person, place and time, well developed and well- nourished, in no acute distress  Skin: warm and dry, no rash or erythema  Head: normocephalic and atraumatic  Eyes: pupils equal, round, and reactive to light, extraocular eye movements intact, conjunctivae normal  ENT: tympanic membrane, external ear and ear canal normal bilaterally, nose without deformity, nasal mucosa and turbinates normal without polyps  Neck: supple and non-tender without mass, no thyromegaly or thyroid nodules, no cervical lymphadenopathy  Pulmonary/Chest: clear to auscultation bilaterally- no wheezes, rales or rhonchi, normal air movement, no respiratory distress  Cardiovascular: normal rate, regular rhythm, normal S1 and S2, no murmurs, rubs, clicks, or gallops, distal pulses intact, no carotid bruits  Abdomen: soft, non-tender, non-distended, normal bowel sounds, no masses or organomegaly  Extremities: no cyanosis, clubbing or edema  Musculoskeletal: normal range of motion, no joint swelling, deformity or tenderness  Neurologic: reflexes normal and symmetric, no cranial nerve deficit, gait, coordination and speech normal       Allergies   Allergen Reactions    Latex Hives     Patient noted in preop that she might be allergic - maybe breaking out when exposed. Demerol Hcl [Meperidine] Nausea And Vomiting     confusion    Propoxyphene     Tape Radha Nancy Tape]      Prior to Visit Medications    Medication Sig Taking? Authorizing Provider   amoxicillin (AMOXIL) 500 MG capsule Take 1 capsule by mouth 3 times daily for 10 days Yes Alonzo Suazo MD   doxepin (SINEQUAN) 10 MG capsule Take 1 capsule by mouth nightly Yes Alonzo Suazo MD   citalopram (CELEXA) 20 MG tablet Take 1 tablet by mouth 2 times daily Yes Alonzo Suazo MD   VENTOLIN  (90 Base) MCG/ACT inhaler INHALE 2 PUFFS BY MOUTH 4 TIMES A DAY AS NEEDED Yes Alonzo Suazo MD   nystatin (MYCOSTATIN) 866254 UNIT/GM powder Apply 3 times daily. Yes Alonzo Suazo MD   propranolol (INDERAL LA) 120 MG extended release capsule TAKE 1 CAPSULE BY MOUTH EVERY DAY Yes Alonzo Suazo MD   omeprazole (PRILOSEC) 40 MG delayed release capsule TAKE 1 CAPSULE BY MOUTH EVERY DAY Yes Alonzo Suazo MD   simvastatin (ZOCOR) 40 MG tablet TAKE 1 TABLET BY MOUTH EVERY DAY AT NIGHT Yes Alonzo Suazo MD   famotidine (PEPCID) 20 MG tablet Take 1 tablet by mouth 2 times daily Yes Alonzo Suazo MD   budesonide-formoterol (SYMBICORT) 160-4.5 MCG/ACT AERO TAKE 2 PUFFS BY MOUTH TWICE A DAY Yes Alonzo Suazo MD   bumetanide (BUMEX) 0.5 MG tablet TAKE 1 TABLET BY MOUTH EVERY DAY Yes Alonzo Suazo MD   triamcinolone (KENALOG) 0.1 % cream Apply topically 2 times daily Apply topically 2 times daily.  Yes Historical Provider, MD   solifenacin (VESICARE) 5 MG tablet TAKE 1 TABLET BY MOUTH EVERY DAY Yes Marina Barnett PA-C   LORazepam (ATIVAN) 0.5 MG tablet Take 0.5 mg by mouth as needed for Anxiety. Yes Historical Provider, MD   nitroGLYCERIN (NITROSTAT) 0.4 MG SL tablet Place 1 tablet under the tongue every 5 minutes as needed for Chest pain Yes Alma Rosa Man MD   Probiotic Product ([employer] PROBIOTIC) CAPS Take by mouth Yes Historical Provider, MD   diclofenac sodium 1 % GEL Apply 2 g topically 4 times daily Yes Tello Bess MD   acetaminophen (TYLENOL) 500 MG tablet Take 500 mg by mouth every 6 hours as needed for Pain Yes Historical Provider, MD   Cholecalciferol (VITAMIN D3) 5000 units TABS Take by mouth daily  Yes Historical Provider, MD   aspirin 81 MG tablet Take 81 mg by mouth daily Yes Historical Provider, MD   fexofenadine (ALLEGRA) 180 MG tablet Take 180 mg by mouth daily. Yes Historical Provider, MD   Multiple Vitamin (MULTI-VITAMINS PO) Take  by mouth daily.  Yes Historical Provider, MD Britton (Including outside providers/suppliers regularly involved in providing care):   Patient Care Team:  Melita Chowdhury MD as PCP - General (Family Medicine)  Melita Chowdhury MD as PCP - [facility] Empaneled Provider  Alma Rosa Man MD as Cardiologist (Cardiology)     Reviewed and updated this visit:  Tobacco  Allergies  Meds  Problems  Med Hx  Surg Hx  Soc Hx  Fam Hx

## 2023-01-11 NOTE — PATIENT INSTRUCTIONS
Preventing Falls: Care Instructions  Overview     Getting around your home safely can be a challenge if you have injuries or health problems that make it easy for you to fall. Loose rugs and furniture in walkways are among the dangers for many older people who have problems walking or who have poor eyesight. People who have conditions such as arthritis, osteoporosis, or dementia also have to be careful not to fall.  You can make your home safer with a few simple measures.  Follow-up care is a key part of your treatment and safety. Be sure to make and go to all appointments, and call your doctor if you are having problems. It's also a good idea to know your test results and keep a list of the medicines you take.  How can you care for yourself at home?  Taking care of yourself  Exercise regularly to improve your strength, muscle tone, and balance. Walk if you can. Swimming may be a good choice if you cannot walk easily.  Have your vision and hearing checked each year or any time you notice a change. If you have trouble seeing and hearing, you might not be able to avoid objects and could lose your balance.  Know the side effects of the medicines you take. Ask your doctor or pharmacist whether the medicines you take can affect your balance. Sleeping pills or sedatives can affect your balance.  Limit the amount of alcohol you drink. Alcohol can impair your balance and other senses.  Ask your doctor whether calluses or corns on your feet need to be removed. If you wear loose-fitting shoes because of calluses or corns, you can lose your balance and fall.  Talk to your doctor if you have numbness in your feet.  You may get dizzy if you do not drink enough water. To prevent dehydration, drink plenty of fluids. Choose water and other clear liquids. If you have kidney, heart, or liver disease and have to limit fluids, talk with your doctor before you increase the amount of fluids you drink.  Preventing falls at  home  Remove raised doorway thresholds, throw rugs, and clutter. Repair loose carpet or raised areas in the floor.  Move furniture and electrical cords to keep them out of walking paths.  Use nonskid floor wax, and wipe up spills right away, especially on ceramic tile floors.  If you use a walker or cane, put rubber tips on it. If you use crutches, clean the bottoms of them regularly with an abrasive pad, such as steel wool.  Keep your house well lit, especially stairways, porches, and outside walkways. Use night-lights in areas such as hallways and bathrooms. Add extra light switches or use remote switches (such as switches that go on or off when you clap your hands) to make it easier to turn lights on if you have to get up during the night.  Install sturdy handrails on stairways.  Move items in your cabinets so that the things you use a lot are on the lower shelves (about waist level).  Keep a cordless phone and a flashlight with new batteries by your bed. If possible, put a phone in each of the main rooms of your house, or carry a cell phone in case you fall and cannot reach a phone. Or, you can wear a device around your neck or wrist. You push a button that sends a signal for help.  Wear low-heeled shoes that fit well and give your feet good support. Use footwear with nonskid soles. Check the heels and soles of your shoes for wear. Repair or replace worn heels or soles.  Do not wear socks without shoes on smooth floors, such as wood.  Walk on the grass when the sidewalks are slippery. If you live in an area that gets snow and ice in the winter, sprinkle salt on slippery steps and sidewalks. Or ask a family member or friend to do this for you.  Preventing falls in the bath  Install grab bars and nonskid mats inside and outside your shower or tub and near the toilet and sinks.  Use shower chairs and bath benches.  Use a hand-held shower head that will allow you to sit while showering.  Get into a tub or shower by  putting the weaker leg in first. Get out of a tub or shower with your strong side first.  Repair loose toilet seats and consider installing a raised toilet seat to make getting on and off the toilet easier. Keep your bathroom door unlocked while you are in the shower. Where can you learn more? Go to http://www.holman.com/ and enter G117 to learn more about \"Preventing Falls: Care Instructions. \"  Current as of: May 4, 2022               Content Version: 13.5  © 1173-5135 Healthwise, [employer]. Care instructions adapted under license by [facility] ([facility]). If you have questions about a medical condition or this instruction, always ask your healthcare professional. [address] any warranty or liability for your use of this information. Learning About Being Active as an Older Adult  Why is being active important as you get older? Being active is one of the best things you can do for your health. And it's never too late to start. Being active--or getting active, if you aren't already--has definite benefits. It can:  Give you more energy,  Keep your mind sharp. Improve balance to reduce your risk of falls. Help you manage chronic illness with fewer medicines. No matter how old you are, how fit you are, or what health problems you have, there is a form of activity that will work for you. And the more physical activity you can do, the better your overall health will be. What kinds of activity can help you stay healthy? Being more active will make your daily activities easier. Physical activity includes planned exercise and things you do in daily life. There are four types of activity:  Aerobic. Doing aerobic activity makes your heart and lungs strong. Includes walking, dancing, and gardening. Aim for at least 2½ hours spread throughout the week. It improves your energy and can help you sleep better. Muscle-strengthening.   This type of activity can help maintain muscle and strengthen bones. Includes climbing stairs, using resistance bands, and lifting or carrying heavy loads. Aim for at least twice a week. It can help protect the knees and other joints. Stretching. Stretching gives you better range of motion in joints and muscles. Includes upper arm stretches, calf stretches, and gentle yoga. Aim for at least twice a week, preferably after your muscles are warmed up from other activities. It can help you function better in daily life. Balancing. This helps you stay coordinated and have good posture. Includes heel-to-toe walking, jose chi, and certain types of yoga. Aim for at least 3 days a week. It can reduce your risk of falling. Even if you have a hard time meeting the recommendations, it's better to be more active than less active. All activity done in each category counts toward your weekly total. You'd be surprised how daily things like carrying groceries, keeping up with grandchildren, and taking the stairs can add up. What keeps you from being active? If you've had a hard time being more active, you're not alone. Maybe you remember being able to do more. Or maybe you've never thought of yourself as being active. It's frustrating when you can't do the things you want. Being more active can help. What's holding you back? Getting started. Have a goal, but break it into easy tasks. Small steps build into big accomplishments. Staying motivated. If you feel like skipping your activity, remember your goal. Maybe you want to move better and stay independent. Every activity gets you one step closer. Not feeling your best.  Start with 5 minutes of an activity you enjoy. Prove to yourself you can do it. As you get comfortable, increase your time. You may not be where you want to be. But you're in the process of getting there. Everyone starts somewhere. How can you find safe ways to stay active?   Talk with your doctor about any physical challenges you're facing. Make a plan with your doctor if you have a health problem or aren't sure how to get started with activity. If you're already active, ask your doctor if there is anything you should change to stay safe as your body and health change. If you tend to feel dizzy after you take medicine, avoid activity at that time. Try being active before you take your medicine. This will reduce your risk of falls. If you plan to be active at home, make sure to clear your space before you get started. Remove things like TV cords, coffee tables, and throw rugs. It's safest to have plenty of space to move freely. The key to getting more active is to take it slow and steady. Try to improve only a little bit at a time. Pick just one area to improve on at first. And if an activity hurts, stop and talk to your doctor. Where can you learn more? Go to http://www.holman.com/ and enter P600 to learn more about \"Learning About Being Active as an Older Adult. \"  Current as of: October 10, 2022               Content Version: 13.5  © 3195-6598 Healthwise, [employer]. Care instructions adapted under license by [facility] ([facility]). If you have questions about a medical condition or this instruction, always ask your healthcare professional. [place] [address] any warranty or liability for your use of this information. Learning About Dental Care for Older Adults  Dental care for older adults: Overview  Dental care for older people is much the same as for younger adults. But older adults do have concerns that younger adults do not. Older adults may have problems with gum disease and decay on the roots of their teeth. They may need missing teeth replaced or broken fillings fixed. Or they may have dentures that need to be cared for. Some older adults may have trouble holding a toothbrush. You can help remind the person you are caring for to brush and floss their teeth or to clean their dentures.  In some cases, you may need to do the brushing and other dental care tasks. People who have trouble using their hands or who have dementia may need this extra help. How can you help with dental care? Normal dental care  To keep the teeth and gums healthy:  Brush the teeth with fluoride toothpaste twice a day--in the morning and at night--and floss at least once a day. Plaque can quickly build up on the teeth of older adults. Watch for the signs of gum disease. These signs include gums that bleed after brushing or after eating hard foods, such as apples. See a dentist regularly. Many experts recommend checkups every 6 months. Keep the dentist up to date on any new medications the person is taking. Encourage a balanced diet that includes whole grains, vegetables, and fruits, and that is low in saturated fat and sodium. Encourage the person you're caring for not to use tobacco products. They can affect dental and general health. Many older adults have a fixed income and feel that they can't afford dental care. But most [facility] and [facility] have programs in which dentists help older adults by lowering fees. Contact your area's public health offices or  for information about dental care in your area. Using a toothbrush  Older adults with arthritis sometimes have trouble brushing their teeth because they can't easily hold the toothbrush. Their hands and fingers may be stiff, painful, or weak. If this is the case, you can: Offer an electric toothbrush. Enlarge the handle of a non-electric toothbrush by wrapping a sponge, an elastic bandage, or adhesive tape around it. Push the toothbrush handle through a ball made of rubber or soft foam.  Make the handle longer and thicker by taping Popsicle sticks or tongue depressors to it. You may also be able to buy special toothbrushes, toothpaste dispensers, and floss holders.   Your doctor may recommend a soft-bristle toothbrush if the person you care for bleeds easily. Bleeding can happen because of a health problem or from certain medicines. A toothpaste for sensitive teeth may help if the person you care for has sensitive teeth. How do you brush and floss someone's teeth? If the person you are caring for has a hard time cleaning their teeth on their own, you may need to brush and floss their teeth for them. It may be easiest to have the person sit and face away from you, and to sit or stand behind them. That way you can steady their head against your arm as you reach around to floss and brush their teeth. Choose a place that has good lighting and is comfortable for both of you. Before you begin, gather your supplies. You will need gloves, floss, a toothbrush, and a container to hold water if you are not near a sink. Wash and dry your hands well and put on gloves. Start by flossing:  Gently work a piece of floss between each of the teeth toward the gums. A plastic flossing tool may make this easier, and they are available at most [facility]. Curve the floss around each tooth into a U-shape and gently slide it under the gum line. Move the floss firmly up and down several times to scrape off the plaque. After you've finished flossing, throw away the used floss and begin brushing:  Wet the brush and apply toothpaste. Place the brush at a 45-degree angle where the teeth meet the gums. Press firmly, and move the brush in small circles over the surface of the teeth. Be careful not to brush too hard. Vigorous brushing can make the gums pull away from the teeth and can scratch the tooth enamel. Brush all surfaces of the teeth, on the tongue side and on the cheek side. Pay special attention to the front teeth and all surfaces of the back teeth. Brush chewing surfaces with short back-and-forth strokes. After you've finished, help the person rinse the remaining toothpaste from their mouth. Where can you learn more?   Go to http://www.RESPACE.com/ and enter F944 to learn more about \"Learning About Dental Care for Older Adults. \"  Current as of: June 16, 2022               Content Version: 13.5  © 2006-2022 Healthwise, [employer]. Care instructions adapted under license by [facility] ([facility]). If you have questions about a medical condition or this instruction, always ask your healthcare professional. [address] any warranty or liability for your use of this information. Hearing Loss: Care Instructions  Overview     Hearing loss is a sudden or slow decrease in how well you hear. It can range from mild to severe. Permanent hearing loss can occur with aging. It also can happen when you are exposed long-term to loud noise. Examples include listening to loud music, riding motorcycles, or being around other loud machines. Hearing loss can affect your work and home life. It can make you feel lonely or depressed. You may feel that you have lost your independence. But hearing aids and other devices can help you hear better and feel connected to others. Follow-up care is a key part of your treatment and safety. Be sure to make and go to all appointments, and call your doctor if you are having problems. It's also a good idea to know your test results and keep a list of the medicines you take. How can you care for yourself at home? Avoid loud noises whenever possible. This helps keep your hearing from getting worse. Always wear hearing protection around loud noises. Wear a hearing aid as directed. See a professional who can help you pick a hearing aid that fits you. Have hearing tests as your doctor suggests. They can show whether your hearing has changed. Your hearing aid may need to be adjusted. Use other devices as needed. These may include:  Telephone amplifiers and hearing aids that can connect to a television, stereo, radio, or microphone. Devices that use lights or vibrations.  These alert you to the doorbell, a ringing telephone, or a baby monitor. Television closed-captioning. This shows the words at the bottom of the screen. Most new TVs can do this. TTY (text telephone). This lets you type messages back and forth on the telephone instead of talking or listening. These devices are also called TDD. When messages are typed on the keyboard, they are sent over the phone line to a receiving TTY. The message is shown on a monitor. Use text messaging, social media, and email if it is hard for you to communicate by telephone. Try to learn a listening technique called speechreading. It is not lipreading. You pay attention to people's gestures, expressions, posture, and tone of voice. These clues can help you understand what a person is saying. Face the person you are talking to, and have them face you. Make sure the lighting is good. You need to see the other person's face clearly. Think about counseling if you need help to adjust to your hearing loss. When should you call for help? Watch closely for changes in your health, and be sure to contact your doctor if:    You think your hearing is getting worse.     You have new symptoms, such as dizziness or nausea. Where can you learn more? Go to http://www.holman.com/ and enter R798 to learn more about \"Hearing Loss: Care Instructions. \"  Current as of: May 4, 2022               Content Version: 13.5  © 5274-7960 Healthwise, [employer]orporated. Care instructions adapted under license by [facility] ([facility]). If you have questions about a medical condition or this instruction, always ask your healthcare professional. [place] [address] any warranty or liability for your use of this information. Advance Directives: Care Instructions  Overview  An advance directive is a legal way to state your wishes at the end of your life. It tells your family and your doctor what to do if you can't say what you want. There are two main types of advance directives.  You can change them any time your wishes change. Living will. This form tells your family and your doctor your wishes about life support and other treatment. The form is also called a declaration. Medical power of . This form lets you name a person to make treatment decisions for you when you can't speak for yourself. This person is called a health care agent (health care proxy, health care surrogate). The form is also called a durable power of  for health care. If you do not have an advance directive, decisions about your medical care may be made by a family member, or by a doctor or a  who doesn't know you. It may help to think of an advance directive as a gift to the people who care for you. If you have one, they won't have to make tough decisions by themselves. For more information, including forms for your state, see the [address] website (www.caringinfo.org/planning/advance-directives/). Follow-up care is a key part of your treatment and safety. Be sure to make and go to all appointments, and call your doctor if you are having problems. It's also a good idea to know your test results and keep a list of the medicines you take. What should you include in an advance directive? Many states have a unique advance directive form. (It may ask you to address specific issues.) Or you might use a universal form that's approved by many states. If your form doesn't tell you what to address, it may be hard to know what to include in your advance directive. Use the questions below to help you get started. Who do you want to make decisions about your medical care if you are not able to? What life-support measures do you want if you have a serious illness that gets worse over time or can't be cured? What are you most afraid of that might happen? (Maybe you're afraid of having pain, losing your independence, or being kept alive by machines.)  Where would you prefer to die? (Your home?  A hospital? A nursing home?)  Do you want to donate your organs when you die? Do you want certain Bahai practices performed before you die? When should you call for help? Be sure to contact your doctor if you have any questions. Where can you learn more? Go to http://www.holman.com/ and enter R264 to learn more about \"Advance Directives: Care Instructions. \"  Current as of: June 16, 2022               Content Version: 13.5  © 2006-2022 [employer]. Care instructions adapted under license by [facility] ([facility]). If you have questions about a medical condition or this instruction, always ask your healthcare professional. [address] any warranty or liability for your use of this information. Eating Healthy Foods: Care Instructions  Your Care Instructions     Eating healthy foods can help lower your risk for disease. Healthy food gives you energy and keeps your heart strong, your brain active, your muscles working, and your bones strong. A healthy diet includes a variety of foods from the basic food groups: grains, vegetables, fruits, milk and milk products, and meat and beans. Some people may eat more of their favorite foods from only one food group and, as a result, miss getting the nutrients they need. So, it is important to pay attention not only to what you eat but also to what you are missing from your diet. You can eat a healthy, balanced diet by making a few small changes. Follow-up care is a key part of your treatment and safety. Be sure to make and go to all appointments, and call your doctor if you are having problems. It's also a good idea to know your test results and keep a list of the medicines you take. How can you care for yourself at home? Look at what you eat  Keep a food diary for a week or two and record everything you eat or drink. Track the number of servings you eat from each food group.   For a balanced diet every day, eat a variety of:  6 or more ounce-equivalents of grains, such as cereals, breads, crackers, rice, or pasta, every day. An ounce-equivalent is 1 slice of bread, 1 cup of ready-to-eat cereal, or ½ cup of cooked rice, cooked pasta, or cooked cereal.  2½ cups of vegetables, especially:  Dark-green vegetables such as broccoli and spinach. Orange vegetables such as carrots and sweet potatoes. Dry beans (such as june and kidney beans) and peas (such as lentils). 2 cups of fresh, frozen, or canned fruit. A small apple or 1 banana or orange equals 1 cup.  3 cups of nonfat or low-fat milk, yogurt, or other milk products. 5½ ounces of meat and beans, such as chicken, fish, lean meat, beans, nuts, and seeds. One egg, 1 tablespoon of peanut butter, ½ ounce nuts or seeds, or ¼ cup of cooked beans equals 1 ounce of meat. Learn how to read food labels for serving sizes and ingredients. Fast-food and convenience-food meals often contain few or no fruits or vegetables. Make sure you eat some fruits and vegetables to make the meal more nutritious. Look at your food diary. For each food group, add up what you have eaten and then divide the total by the number of days. This will give you an idea of how much you are eating from each food group. See if you can find some ways to change your diet to make it more healthy. Start small  Do not try to make dramatic changes to your diet all at once. You might feel that you are missing out on your favorite foods and then be more likely to fail. Start slowly, and gradually change your habits. Try some of the following:  Use whole wheat bread instead of white bread. Use nonfat or low-fat milk instead of whole milk. Eat brown rice instead of white rice, and eat whole wheat pasta instead of white-flour pasta. Try low-fat cheeses and low-fat yogurt. Add more fruits and vegetables to meals and have them for snacks. Add lettuce, tomato, cucumber, and onion to sandwiches.   Add fruit to yogurt and cereal.  Enjoy food  You can still eat your favorite foods. You just may need to eat less of them. If your favorite foods are high in fat, salt, and sugar, limit how often you eat them, but do not cut them out entirely. Eat a wide variety of foods. Make healthy choices when eating out  The type of restaurant you choose can help you make healthy choices. Even fast-food chains are now offering more low-fat or healthier choices on the menu. Choose smaller portions, or take half of your meal home. When eating out, try:  A veggie pizza with a whole wheat crust or grilled chicken (instead of sausage or pepperoni). Pasta with roasted vegetables, grilled chicken, or marinara sauce instead of cream sauce. A vegetable wrap or grilled chicken wrap. Broiled or poached food instead of fried or breaded items. Make healthy choices easy  Buy packaged, prewashed, ready-to-eat fresh vegetables and fruits, such as baby carrots, salad mixes, and chopped or shredded broccoli and cauliflower. Buy packaged, presliced fruits, such as melon or pineapple. Choose 100% fruit or vegetable juice instead of soda. Limit juice intake to 4 to 6 oz (½ to ¾ cup) a day. Blend low-fat yogurt, fruit juice, and canned or frozen fruit to make a smoothie for breakfast or a snack. Where can you learn more? Go to http://www.woods.com/ and enter T756 to learn more about \"Eating Healthy Foods: Care Instructions. \"  Current as of: May 9, 2022               Content Version: 13.5  © 6656-7433 Healthwise, Incorporated. Care instructions adapted under license by [facility] ([facility]). If you have questions about a medical condition or this instruction, always ask your healthcare professional. [place] [address] any warranty or liability for your use of this information. Personalized Preventive Plan for Noman Plater - 1/11/2023  Medicare offers a range of preventive health benefits.  Some of the tests and screenings are paid in full while other may be subject to a deductible, co-insurance, and/or copay. Some of these benefits include a comprehensive review of your medical history including lifestyle, illnesses that may run in your family, and various assessments and screenings as appropriate. After reviewing your medical record and screening and assessments performed today your provider may have ordered immunizations, labs, imaging, and/or referrals for you. A list of these orders (if applicable) as well as your Preventive Care list are included within your After Visit Summary for your review. Other Preventive Recommendations:    A preventive eye exam performed by an eye specialist is recommended every 1-2 years to screen for glaucoma; cataracts, macular degeneration, and other eye disorders. A preventive dental visit is recommended every 6 months. Try to get at least 150 minutes of exercise per week or 10,000 steps per day on a pedometer . Order or download the FREE \"Exercise & Physical Activity: Your Everyday Guide\" from [employer]. Call 1-377.580.6119 or search [employer] online. You need 8809-1783 mg of calcium and 9320-8022 IU of vitamin D per day. It is possible to meet your calcium requirement with diet alone, but a vitamin D supplement is usually necessary to meet this goal.  When exposed to the sun, use a sunscreen that protects against both UVA and UVB radiation with an SPF of 30 or greater. Reapply every 2 to 3 hours or after sweating, drying off with a towel, or swimming. Always wear a seat belt when traveling in a car. Always wear a helmet when riding a bicycle or motorcycle.

## 2023-01-11 NOTE — PROGRESS NOTES
Jurgen Archer (:  1945) is a 69 y.o. female,Established patient, here for evaluation of the following chief complaint(s):  Medicare AWV         ASSESSMENT/PLAN:  1. Medicare annual wellness visit, subsequent  2. Acute bacterial sinusitis  -     amoxicillin (AMOXIL) 500 MG capsule; Take 1 capsule by mouth 3 times daily for 10 days, Disp-30 capsule, R-0Normal  -monitor sxs, call if not improving    3. Type 2 diabetes mellitus with microalbuminuria, without long-term current use of insulin ([facility]  4. Type 2 diabetes mellitus without complication, without long-term current use of insulin (HCC)  5. Stage 3 chronic kidney disease, unspecified whether stage 3a or 3b CKD ([facility]  6. Renal cell carcinoma, unspecified laterality ([facility]      Return for Medicare Annual Wellness Visit in 1 year. Subjective   SUBJECTIVE/OBJECTIVE:  HPI  Pt seen for several days of cough and sinus pressure. Feels she has been passing this back and forth with her . Using OTCs. , non smoker, pmh reviewed. Review of Systems   Constitutional:  Negative for chills, fatigue, fever and unexpected weight change. HENT:  Positive for congestion and sinus pressure. Negative for ear pain, rhinorrhea and sore throat. Eyes:  Negative for pain and visual disturbance. Respiratory:  Positive for cough. Negative for chest tightness, shortness of breath and wheezing. Cardiovascular:  Negative for chest pain and palpitations. Gastrointestinal:  Negative for abdominal pain, blood in stool, constipation, diarrhea, nausea and vomiting. Genitourinary:  Negative for difficulty urinating, frequency, hematuria and urgency. Musculoskeletal:  Negative for back pain, joint swelling, myalgias and neck pain. Skin:  Negative for rash. Neurological:  Negative for dizziness and headaches. Hematological:  Negative for adenopathy. Does not bruise/bleed easily.    Psychiatric/Behavioral:  Negative for behavioral problems and sleep disturbance. The patient is not nervous/anxious. Objective   Physical Exam--see other note             An electronic signature was used to authenticate this note.     --Ruben Nelson MD

## 2023-01-25 ENCOUNTER — TELEPHONE (OUTPATIENT)
Dept: FAMILY MEDICINE CLINIC | Age: 78
End: 2023-01-25

## 2023-03-14 DIAGNOSIS — N32.81 OAB (OVERACTIVE BLADDER): ICD-10-CM

## 2023-03-14 RX ORDER — SOLIFENACIN SUCCINATE 5 MG/1
TABLET, FILM COATED ORAL
Qty: 90 TABLET | Refills: 3 | Status: SHIPPED | OUTPATIENT
Start: 2023-03-14

## 2023-03-31 DIAGNOSIS — J45.41 MODERATE PERSISTENT ASTHMA WITH EXACERBATION: ICD-10-CM

## 2023-03-31 DIAGNOSIS — B35.6 TINEA CRURIS: ICD-10-CM

## 2023-03-31 DIAGNOSIS — L30.4 INTERTRIGO: ICD-10-CM

## 2023-04-03 RX ORDER — FLUCONAZOLE 100 MG/1
TABLET ORAL
Qty: 30 TABLET | Refills: 1 | OUTPATIENT
Start: 2023-04-03

## 2023-04-03 NOTE — TELEPHONE ENCOUNTER
Esdras Liz called requesting a refill on the following medications:  Requested Prescriptions     Pending Prescriptions Disp Refills    ipratropium (ATROVENT) 0.02 % nebulizer solution [Pharmacy Med Name: IPRATROPIUM BR 0.02% SOLN] 250 mL 3     Sig: TAKE 2.5 MLS BY NEBULIZATION 4 TIMES DAILY    fluconazole (DIFLUCAN) 100 MG tablet [Pharmacy Med Name: FLUCONAZOLE 100 MG TABLET] 30 tablet 1     Sig: TAKE 1 TABLET BY MOUTH EVERY DAY       Date of last visit: 1/11/2023  Date of next visit (if applicable):Visit date not found  Date of last refill: 6/29/20216/8/2022  Pharmacy Name: [address]      [address], [address]

## 2023-04-17 DIAGNOSIS — F51.01 PRIMARY INSOMNIA: ICD-10-CM

## 2023-04-17 RX ORDER — DOXEPIN HYDROCHLORIDE 10 MG/1
CAPSULE ORAL
Qty: 90 CAPSULE | Refills: 3 | OUTPATIENT
Start: 2023-04-17

## 2023-04-17 NOTE — TELEPHONE ENCOUNTER
Isidro Manzanares called requesting a refill on the following medications:  Requested Prescriptions     Pending Prescriptions Disp Refills    doxepin (SINEQUAN) 10 MG capsule [Pharmacy Med Name: DOXEPIN 10 MG CAPSULE] 90 capsule 3     Sig: TAKE 1 CAPSULE BY MOUTH EVERY DAY AT NIGHT       Date of last visit: 1/11/2023  Date of next visit (if applicable):Visit date not found  Date of last refill: 7/13/2022 30 with 11 refills  Ph[facility] Name: [facility]      Thanks,  [employer], [address] [place] [address]

## 2023-04-21 ENCOUNTER — TELEPHONE (OUTPATIENT)
Dept: UROLOGY | Age: 78
End: 2023-04-21

## 2023-04-21 NOTE — TELEPHONE ENCOUNTER
Patient scheduled for US RENAL COMP  at [facility] on 10/12/2023 ARRIVAL OF 10AM FOR A 1015AM SCAN. NO CARBONATED BEVERAGES; ARRIVE WELL HYDRATED WITH A FULL BLADDER.     Order mailed with instructions  to the patient

## 2023-04-24 ENCOUNTER — HOSPITAL ENCOUNTER (OUTPATIENT)
Dept: WOMENS IMAGING | Age: 78
Discharge: HOME | End: 2023-04-24
Payer: MEDICARE

## 2023-04-24 DIAGNOSIS — Z12.31 VISIT FOR SCREENING MAMMOGRAM: ICD-10-CM

## 2023-04-24 PROCEDURE — 77063 BREAST TOMOSYNTHESIS BI: CPT

## 2023-07-13 DIAGNOSIS — I10 BENIGN ESSENTIAL HTN: ICD-10-CM

## 2023-07-13 DIAGNOSIS — E78.00 HYPERCHOLESTEROLEMIA: ICD-10-CM

## 2023-07-13 DIAGNOSIS — F32.A DEPRESSION, UNSPECIFIED DEPRESSION TYPE: ICD-10-CM

## 2023-07-13 DIAGNOSIS — R60.9 PERIPHERAL EDEMA: ICD-10-CM

## 2023-07-13 DIAGNOSIS — K21.9 GASTROESOPHAGEAL REFLUX DISEASE, UNSPECIFIED WHETHER ESOPHAGITIS PRESENT: ICD-10-CM

## 2023-07-13 RX ORDER — CITALOPRAM 20 MG/1
TABLET ORAL
Qty: 60 TABLET | Refills: 0 | Status: SHIPPED | OUTPATIENT
Start: 2023-07-13

## 2023-07-13 RX ORDER — FAMOTIDINE 20 MG/1
TABLET, FILM COATED ORAL
Qty: 60 TABLET | Refills: 0 | Status: SHIPPED | OUTPATIENT
Start: 2023-07-13

## 2023-07-13 RX ORDER — OMEPRAZOLE 40 MG/1
CAPSULE, DELAYED RELEASE ORAL
Qty: 30 CAPSULE | Refills: 0 | Status: SHIPPED | OUTPATIENT
Start: 2023-07-13

## 2023-07-13 RX ORDER — SIMVASTATIN 40 MG
TABLET ORAL
Qty: 30 TABLET | Refills: 0 | Status: SHIPPED | OUTPATIENT
Start: 2023-07-13

## 2023-07-13 RX ORDER — BUMETANIDE 0.5 MG/1
TABLET ORAL
Qty: 30 TABLET | Refills: 0 | Status: SHIPPED | OUTPATIENT
Start: 2023-07-13

## 2023-07-13 RX ORDER — PROPRANOLOL HYDROCHLORIDE 120 MG/1
CAPSULE, EXTENDED RELEASE ORAL
Qty: 30 CAPSULE | Refills: 0 | Status: SHIPPED | OUTPATIENT
Start: 2023-07-13

## 2023-07-13 NOTE — TELEPHONE ENCOUNTER
Salas Morales called requesting a refill on the following medications:  Requested Prescriptions     Pending Prescriptions Disp Refills    simvastatin (ZOCOR) 40 MG tablet [Pharmacy Med Name: SIMVASTATIN 40 MG TABLET] 90 tablet 3     Sig: TAKE 1 TABLET BY MOUTH EVERY DAY AT NIGHT    famotidine (PEPCID) 20 MG tablet [Pharmacy Med Name: FAMOTIDINE 20 MG TABLET] 180 tablet 3     Sig: TAKE 1 TABLET BY MOUTH TWICE A DAY    omeprazole (PRILOSEC) 40 MG delayed release capsule [Pharmacy Med Name: OMEPRAZOLE DR 40 MG CAPSULE] 90 capsule 3     Sig: TAKE 1 CAPSULE BY MOUTH EVERY DAY    citalopram (CELEXA) 20 MG tablet [Pharmacy Med Name: CITALOPRAM HBR 20 MG TABLET] 180 tablet 3     Sig: TAKE 1 TABLET BY MOUTH TWICE A DAY    propranolol (INDERAL LA) 120 MG extended release capsule [Pharmacy Med Name: PROPRANOLOL  MG CAPSULE] 90 capsule 3     Sig: TAKE 1 CAPSULE BY MOUTH EVERY DAY    bumetanide (BUMEX) 0.5 MG tablet [Pharmacy Med Name: BUMETANIDE 0.5 MG TABLET] 90 tablet 3     Sig: TAKE 1 TABLET BY MOUTH EVERY DAY       Date of last visit: 1/11/2023  Date of next visit (if applicable):Visit date not found  Date of last refill: 7/13/2022 1 year  [facility]    Patient is due for physical and refills       Thanks,  ANURAG Castaneda CMA

## 2023-08-03 ENCOUNTER — OFFICE VISIT (OUTPATIENT)
Dept: FAMILY MEDICINE CLINIC | Age: 78
End: 2023-08-03
Payer: MEDICARE

## 2023-08-03 VITALS
HEIGHT: 65 IN | HEART RATE: 80 BPM | SYSTOLIC BLOOD PRESSURE: 126 MMHG | BODY MASS INDEX: 34.35 KG/M2 | DIASTOLIC BLOOD PRESSURE: 66 MMHG | RESPIRATION RATE: 20 BRPM | TEMPERATURE: 97.6 F | WEIGHT: 206.2 LBS

## 2023-08-03 DIAGNOSIS — F51.01 PRIMARY INSOMNIA: ICD-10-CM

## 2023-08-03 DIAGNOSIS — N32.81 OAB (OVERACTIVE BLADDER): ICD-10-CM

## 2023-08-03 DIAGNOSIS — R60.9 PERIPHERAL EDEMA: ICD-10-CM

## 2023-08-03 DIAGNOSIS — I10 BENIGN ESSENTIAL HTN: ICD-10-CM

## 2023-08-03 DIAGNOSIS — E11.9 TYPE 2 DIABETES MELLITUS WITHOUT COMPLICATION, WITHOUT LONG-TERM CURRENT USE OF INSULIN (HCC): ICD-10-CM

## 2023-08-03 DIAGNOSIS — Z00.00 ANNUAL PHYSICAL EXAM: Primary | ICD-10-CM

## 2023-08-03 DIAGNOSIS — J45.40 MODERATE PERSISTENT ASTHMA WITHOUT COMPLICATION: ICD-10-CM

## 2023-08-03 DIAGNOSIS — F33.1 MAJOR DEPRESSIVE DISORDER, RECURRENT, MODERATE (HCC): ICD-10-CM

## 2023-08-03 DIAGNOSIS — E78.00 HYPERCHOLESTEROLEMIA: ICD-10-CM

## 2023-08-03 DIAGNOSIS — K21.9 GASTROESOPHAGEAL REFLUX DISEASE, UNSPECIFIED WHETHER ESOPHAGITIS PRESENT: ICD-10-CM

## 2023-08-03 DIAGNOSIS — F32.A DEPRESSION, UNSPECIFIED DEPRESSION TYPE: ICD-10-CM

## 2023-08-03 DIAGNOSIS — E11.22 TYPE 2 DIABETES MELLITUS WITH CHRONIC KIDNEY DISEASE, WITHOUT LONG-TERM CURRENT USE OF INSULIN, UNSPECIFIED CKD STAGE (HCC): ICD-10-CM

## 2023-08-03 DIAGNOSIS — I25.119 ATHEROSCLEROSIS OF NATIVE CORONARY ARTERY OF NATIVE HEART WITH ANGINA PECTORIS (HCC): ICD-10-CM

## 2023-08-03 LAB
ALBUMIN SERPL BCG-MCNC: 4 G/DL (ref 3.5–5.1)
ALP SERPL-CCNC: 132 U/L (ref 38–126)
ALT SERPL W/O P-5'-P-CCNC: 9 U/L (ref 11–66)
ANION GAP SERPL CALC-SCNC: 12 MEQ/L (ref 8–16)
AST SERPL-CCNC: 20 U/L (ref 5–40)
BASOPHILS ABSOLUTE: 0 THOU/MM3 (ref 0–0.1)
BASOPHILS NFR BLD AUTO: 0.4 %
BILIRUB SERPL-MCNC: 0.3 MG/DL (ref 0.3–1.2)
BUN SERPL-MCNC: 14 MG/DL (ref 7–22)
CALCIUM SERPL-MCNC: 9.1 MG/DL (ref 8.5–10.5)
CHLORIDE SERPL-SCNC: 101 MEQ/L (ref 98–111)
CHOLEST SERPL-MCNC: 202 MG/DL (ref 100–199)
CO2 SERPL-SCNC: 26 MEQ/L (ref 23–33)
CREAT SERPL-MCNC: 0.9 MG/DL (ref 0.4–1.2)
DEPRECATED RDW RBC AUTO: 56.2 FL (ref 35–45)
EOSINOPHIL NFR BLD AUTO: 1.1 %
EOSINOPHILS ABSOLUTE: 0.1 THOU/MM3 (ref 0–0.4)
ERYTHROCYTE [DISTWIDTH] IN BLOOD BY AUTOMATED COUNT: 15.7 % (ref 11.5–14.5)
GFR SERPL CREATININE-BSD FRML MDRD: > 60 ML/MIN/1.73M2
GLUCOSE SERPL-MCNC: 112 MG/DL (ref 70–108)
HCT VFR BLD AUTO: 44 % (ref 37–47)
HDLC SERPL-MCNC: 61 MG/DL
HGB BLD-MCNC: 13.4 GM/DL (ref 12–16)
IMM GRANULOCYTES # BLD AUTO: 0.03 THOU/MM3 (ref 0–0.07)
IMM GRANULOCYTES NFR BLD AUTO: 0.3 %
LDLC SERPL CALC-MCNC: 118 MG/DL
LYMPHOCYTES ABSOLUTE: 2.8 THOU/MM3 (ref 1–4.8)
LYMPHOCYTES NFR BLD AUTO: 31.2 %
MCH RBC QN AUTO: 29.5 PG (ref 26–33)
MCHC RBC AUTO-ENTMCNC: 30.5 GM/DL (ref 32.2–35.5)
MCV RBC AUTO: 96.9 FL (ref 81–99)
MONOCYTES ABSOLUTE: 0.7 THOU/MM3 (ref 0.4–1.3)
MONOCYTES NFR BLD AUTO: 7.5 %
NEUTROPHILS NFR BLD AUTO: 59.5 %
NRBC BLD AUTO-RTO: 0 /100 WBC
PLATELET # BLD AUTO: 251 THOU/MM3 (ref 130–400)
PMV BLD AUTO: 11.6 FL (ref 9.4–12.4)
POTASSIUM SERPL-SCNC: 4.4 MEQ/L (ref 3.5–5.2)
PROT SERPL-MCNC: 6.8 G/DL (ref 6.1–8)
RBC # BLD AUTO: 4.54 MILL/MM3 (ref 4.2–5.4)
SEGMENTED NEUTROPHILS ABSOLUTE COUNT: 5.4 THOU/MM3 (ref 1.8–7.7)
SODIUM SERPL-SCNC: 139 MEQ/L (ref 135–145)
TRIGL SERPL-MCNC: 114 MG/DL (ref 0–199)
WBC # BLD AUTO: 9 THOU/MM3 (ref 4.8–10.8)

## 2023-08-03 PROCEDURE — 99397 PER PM REEVAL EST PAT 65+ YR: CPT | Performed by: FAMILY MEDICINE

## 2023-08-03 PROCEDURE — 3074F SYST BP LT 130 MM HG: CPT | Performed by: FAMILY MEDICINE

## 2023-08-03 PROCEDURE — 3078F DIAST BP <80 MM HG: CPT | Performed by: FAMILY MEDICINE

## 2023-08-03 RX ORDER — BUMETANIDE 0.5 MG/1
0.5 TABLET ORAL DAILY
Qty: 90 TABLET | Refills: 3 | Status: SHIPPED | OUTPATIENT
Start: 2023-08-03

## 2023-08-03 RX ORDER — CITALOPRAM 20 MG/1
20 TABLET ORAL 2 TIMES DAILY
Qty: 180 TABLET | Refills: 3 | Status: SHIPPED | OUTPATIENT
Start: 2023-08-03

## 2023-08-03 RX ORDER — SOLIFENACIN SUCCINATE 5 MG/1
5 TABLET, FILM COATED ORAL DAILY
Qty: 90 TABLET | Refills: 3 | Status: SHIPPED | OUTPATIENT
Start: 2023-08-03

## 2023-08-03 RX ORDER — OMEPRAZOLE 40 MG/1
40 CAPSULE, DELAYED RELEASE ORAL DAILY
Qty: 90 CAPSULE | Refills: 3 | Status: SHIPPED | OUTPATIENT
Start: 2023-08-03

## 2023-08-03 RX ORDER — ALBUTEROL SULFATE 90 UG/1
AEROSOL, METERED RESPIRATORY (INHALATION)
Qty: 3 EACH | Refills: 3 | Status: SHIPPED | OUTPATIENT
Start: 2023-08-03

## 2023-08-03 RX ORDER — SIMVASTATIN 40 MG
40 TABLET ORAL NIGHTLY
Qty: 90 TABLET | Refills: 3 | Status: SHIPPED | OUTPATIENT
Start: 2023-08-03

## 2023-08-03 RX ORDER — FAMOTIDINE 20 MG/1
20 TABLET, FILM COATED ORAL 2 TIMES DAILY
Qty: 180 TABLET | Refills: 3 | Status: SHIPPED | OUTPATIENT
Start: 2023-08-03

## 2023-08-03 RX ORDER — PROPRANOLOL HYDROCHLORIDE 120 MG/1
120 CAPSULE, EXTENDED RELEASE ORAL DAILY
Qty: 90 CAPSULE | Refills: 3 | Status: SHIPPED | OUTPATIENT
Start: 2023-08-03

## 2023-08-03 RX ORDER — BUDESONIDE AND FORMOTEROL FUMARATE DIHYDRATE 160; 4.5 UG/1; UG/1
AEROSOL RESPIRATORY (INHALATION)
Qty: 3 EACH | Refills: 3 | Status: SHIPPED | OUTPATIENT
Start: 2023-08-03

## 2023-08-03 RX ORDER — DOXEPIN HYDROCHLORIDE 10 MG/1
10 CAPSULE ORAL NIGHTLY
Qty: 90 CAPSULE | Refills: 3 | Status: SHIPPED | OUTPATIENT
Start: 2023-08-03

## 2023-08-03 SDOH — ECONOMIC STABILITY: HOUSING INSECURITY
IN THE LAST 12 MONTHS, WAS THERE A TIME WHEN YOU DID NOT HAVE A STEADY PLACE TO SLEEP OR SLEPT IN A SHELTER (INCLUDING NOW)?: NO

## 2023-08-03 SDOH — ECONOMIC STABILITY: FOOD INSECURITY: WITHIN THE PAST 12 MONTHS, YOU WORRIED THAT YOUR FOOD WOULD RUN OUT BEFORE YOU GOT MONEY TO BUY MORE.: NEVER TRUE

## 2023-08-03 SDOH — ECONOMIC STABILITY: FOOD INSECURITY: WITHIN THE PAST 12 MONTHS, THE FOOD YOU BOUGHT JUST DIDN'T LAST AND YOU DIDN'T HAVE MONEY TO GET MORE.: NEVER TRUE

## 2023-08-03 SDOH — ECONOMIC STABILITY: INCOME INSECURITY: HOW HARD IS IT FOR YOU TO PAY FOR THE VERY BASICS LIKE FOOD, HOUSING, MEDICAL CARE, AND HEATING?: SOMEWHAT HARD

## 2023-08-03 ASSESSMENT — ENCOUNTER SYMPTOMS
VOMITING: 0
CHEST TIGHTNESS: 0
ABDOMINAL PAIN: 0
CONSTIPATION: 0
RHINORRHEA: 0
DIARRHEA: 0
SORE THROAT: 0
BACK PAIN: 0
BLOOD IN STOOL: 0
SHORTNESS OF BREATH: 0
NAUSEA: 0
COUGH: 0
WHEEZING: 0
EYE PAIN: 0

## 2023-08-03 NOTE — PROGRESS NOTES
8/3/2023    Wilfrido Davis (:  1945) is a 69 y.o. female, here for a preventive medicine evaluation. Patient Active Problem List   Diagnosis    Renal cell carcinoma, unspecified laterality ([facility])    Pharyngoesophageal dysphagia    Hoarseness    History of renal cell cancer    Type 2 diabetes mellitus with microalbuminuria, without long-term current use of insulin ([facility])    Type 2 diabetes mellitus    Chronic renal disease, stage III ([facility]) [[id number]]    Major depressive disorder, recurrent, moderate    Atherosclerotic heart disease of native coronary artery with unspecified angina pectoris    Type 2 diabetes mellitus with chronic kidney disease       Review of Systems   Constitutional:  Negative for chills, fatigue, fever and unexpected weight change. HENT:  Negative for congestion, ear pain, rhinorrhea and sore throat. Eyes:  Negative for pain and visual disturbance. Respiratory:  Negative for cough, chest tightness, shortness of breath and wheezing. Cardiovascular:  Negative for chest pain and palpitations. Gastrointestinal:  Negative for abdominal pain, blood in stool, constipation, diarrhea, nausea and vomiting. Genitourinary:  Negative for difficulty urinating, frequency, hematuria and urgency. Musculoskeletal:  Negative for back pain, joint swelling, myalgias and neck pain. Skin:  Negative for rash. Neurological:  Negative for dizziness and headaches. Hematological:  Negative for adenopathy. Does not bruise/bleed easily. Psychiatric/Behavioral:  Negative for behavioral problems and sleep disturbance. The patient is not nervous/anxious. Prior to Visit Medications    Medication Sig Taking?  Authorizing Provider   budesonide-formoterol (SYMBICORT) 160-4.5 MCG/ACT AERO TAKE 2 PUFFS BY MOUTH TWICE A DAY Yes Kenyatta Hayes MD   VENTOLIN  (90 Base) MCG/ACT inhaler INHALE 2 PUFFS BY MOUTH 4 TIMES A DAY AS NEEDED Yes Kenyatta Hayes MD   doxepin (SINEQUAN) 10 MG

## 2023-08-04 ENCOUNTER — TELEPHONE (OUTPATIENT)
Dept: FAMILY MEDICINE CLINIC | Age: 78
End: 2023-08-04

## 2023-08-04 LAB
DEPRECATED MEAN GLUCOSE BLD GHB EST-ACNC: 120 MG/DL (ref 70–126)
HBA1C MFR BLD HPLC: 6 % (ref 4.4–6.4)

## 2023-08-04 NOTE — TELEPHONE ENCOUNTER
----- Message from Padilla Baugh MD sent at 8/4/2023  6:31 AM EDT -----  CMP is stable, glucose at 112. Cholesterol at 202 with normal CRR. CBC is ok. A1C is good at 6.0. Continue present.

## 2023-10-12 ENCOUNTER — HOSPITAL ENCOUNTER (OUTPATIENT)
Dept: ULTRASOUND IMAGING | Age: 78
Discharge: HOME | End: 2023-10-12
Payer: MEDICARE

## 2023-10-12 ENCOUNTER — HOSPITAL ENCOUNTER (OUTPATIENT)
Dept: GENERAL RADIOLOGY | Age: 78
Discharge: HOME | End: 2023-10-12
Payer: MEDICARE

## 2023-10-12 ENCOUNTER — HOSPITAL ENCOUNTER (OUTPATIENT)
Age: 78
Discharge: HOME | End: 2023-10-12
Payer: MEDICARE

## 2023-10-12 DIAGNOSIS — C64.9 RENAL CELL CARCINOMA, UNSPECIFIED LATERALITY (HCC): ICD-10-CM

## 2023-10-12 PROCEDURE — 71046 X-RAY EXAM CHEST 2 VIEWS: CPT

## 2023-10-12 PROCEDURE — 76770 US EXAM ABDO BACK WALL COMP: CPT

## 2023-10-18 ENCOUNTER — IMMUNIZATION (OUTPATIENT)
Dept: FAMILY MEDICINE CLINIC | Age: 78
End: 2023-10-18
Payer: MEDICARE

## 2023-10-18 DIAGNOSIS — Z23 NEED FOR INFLUENZA VACCINATION: Primary | ICD-10-CM

## 2023-10-18 PROCEDURE — G0008 ADMIN INFLUENZA VIRUS VAC: HCPCS | Performed by: FAMILY MEDICINE

## 2023-10-18 PROCEDURE — 90694 VACC AIIV4 NO PRSRV 0.5ML IM: CPT | Performed by: FAMILY MEDICINE

## 2023-10-18 NOTE — PROGRESS NOTES
Immunizations Administered       Name Date Dose Route    Influenza, FLUAD, (age 72 y+), Adjuvanted, 0.5mL 10/18/2023 0.5 mL Intramuscular    Site: Deltoid- Right    Lot: [id number]    NDC: 52544-038-92

## 2023-10-19 ENCOUNTER — OFFICE VISIT (OUTPATIENT)
Dept: UROLOGY | Age: 78
End: 2023-10-19

## 2023-10-19 VITALS
DIASTOLIC BLOOD PRESSURE: 82 MMHG | HEIGHT: 65 IN | WEIGHT: 205.7 LBS | SYSTOLIC BLOOD PRESSURE: 134 MMHG | BODY MASS INDEX: 34.27 KG/M2

## 2023-10-19 DIAGNOSIS — N32.81 OAB (OVERACTIVE BLADDER): Primary | ICD-10-CM

## 2023-10-19 DIAGNOSIS — C64.9 RENAL CELL CARCINOMA, UNSPECIFIED LATERALITY (HCC): ICD-10-CM

## 2023-10-19 DIAGNOSIS — N30.00 ACUTE CYSTITIS WITHOUT HEMATURIA: ICD-10-CM

## 2023-10-19 LAB
BILIRUBIN URINE: NEGATIVE
BLOOD URINE, POC: ABNORMAL
CHARACTER, URINE: CLEAR
COLOR, URINE: YELLOW
GLUCOSE URINE: NEGATIVE MG/DL
KETONES, URINE: NEGATIVE
LEUKOCYTE CLUMPS, URINE: ABNORMAL
NITRITE, URINE: NEGATIVE
PH, URINE: 6 (ref 5–9)
POST VOID RESIDUAL (PVR): 4 ML
PROTEIN, URINE: NEGATIVE MG/DL
SPECIFIC GRAVITY, URINE: 1.01 (ref 1–1.03)
UROBILINOGEN, URINE: 0.2 EU/DL (ref 0–1)

## 2023-10-20 LAB
BACTERIA UR CULT: ABNORMAL
ORGANISM: ABNORMAL

## 2023-10-21 ENCOUNTER — TELEPHONE (OUTPATIENT)
Dept: UROLOGY | Age: 78
End: 2023-10-21

## 2023-10-21 DIAGNOSIS — R31.29 MICROSCOPIC HEMATURIA: Primary | ICD-10-CM

## 2023-10-21 NOTE — TELEPHONE ENCOUNTER
Please let Ms. Titi Huggins know that her urine is negative for infection, however she does have trace blood in her urine. 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 I would like for her to submit a urine sample for cytology. This can be done at her convenience.

## 2023-10-23 NOTE — TELEPHONE ENCOUNTER
Patient advised of the urine results. She voiced understanding and will give a urine sample for cytology.

## 2023-10-26 ENCOUNTER — NURSE ONLY (OUTPATIENT)
Dept: LAB | Age: 78
End: 2023-10-26

## 2023-10-26 DIAGNOSIS — C64.9 RENAL CELL CARCINOMA, UNSPECIFIED LATERALITY (HCC): ICD-10-CM

## 2023-10-26 LAB
ALBUMIN SERPL BCG-MCNC: 4 G/DL (ref 3.5–5.1)
ALP SERPL-CCNC: 100 U/L (ref 38–126)
ALT SERPL W/O P-5'-P-CCNC: < 5 U/L (ref 11–66)
ANION GAP SERPL CALC-SCNC: 11 MEQ/L (ref 8–16)
AST SERPL-CCNC: 10 U/L (ref 5–40)
BASOPHILS ABSOLUTE: 0.1 THOU/MM3 (ref 0–0.1)
BASOPHILS NFR BLD AUTO: 0.7 %
BILIRUB SERPL-MCNC: 0.3 MG/DL (ref 0.3–1.2)
BUN SERPL-MCNC: 13 MG/DL (ref 7–22)
CALCIUM SERPL-MCNC: 9 MG/DL (ref 8.5–10.5)
CHLORIDE SERPL-SCNC: 101 MEQ/L (ref 98–111)
CO2 SERPL-SCNC: 30 MEQ/L (ref 23–33)
CREAT SERPL-MCNC: 0.8 MG/DL (ref 0.4–1.2)
DEPRECATED RDW RBC AUTO: 49.6 FL (ref 35–45)
EOSINOPHIL NFR BLD AUTO: 1.9 %
EOSINOPHILS ABSOLUTE: 0.2 THOU/MM3 (ref 0–0.4)
ERYTHROCYTE [DISTWIDTH] IN BLOOD BY AUTOMATED COUNT: 14.3 % (ref 11.5–14.5)
GFR SERPL CREATININE-BSD FRML MDRD: > 60 ML/MIN/1.73M2
GLUCOSE SERPL-MCNC: 103 MG/DL (ref 70–108)
HCT VFR BLD AUTO: 38.6 % (ref 37–47)
HGB BLD-MCNC: 12.2 GM/DL (ref 12–16)
IMM GRANULOCYTES # BLD AUTO: 0.05 THOU/MM3 (ref 0–0.07)
IMM GRANULOCYTES NFR BLD AUTO: 0.5 %
LYMPHOCYTES ABSOLUTE: 3.4 THOU/MM3 (ref 1–4.8)
LYMPHOCYTES NFR BLD AUTO: 32.7 %
MCH RBC QN AUTO: 29.7 PG (ref 26–33)
MCHC RBC AUTO-ENTMCNC: 31.6 GM/DL (ref 32.2–35.5)
MCV RBC AUTO: 93.9 FL (ref 81–99)
MONOCYTES ABSOLUTE: 0.7 THOU/MM3 (ref 0.4–1.3)
MONOCYTES NFR BLD AUTO: 7.2 %
NEUTROPHILS NFR BLD AUTO: 57 %
NRBC BLD AUTO-RTO: 0 /100 WBC
PLATELET # BLD AUTO: 260 THOU/MM3 (ref 130–400)
PMV BLD AUTO: 11.5 FL (ref 9.4–12.4)
POTASSIUM SERPL-SCNC: 4 MEQ/L (ref 3.5–5.2)
PROT SERPL-MCNC: 6.1 G/DL (ref 6.1–8)
RBC # BLD AUTO: 4.11 MILL/MM3 (ref 4.2–5.4)
SEGMENTED NEUTROPHILS ABSOLUTE COUNT: 5.9 THOU/MM3 (ref 1.8–7.7)
SODIUM SERPL-SCNC: 142 MEQ/L (ref 135–145)
WBC # BLD AUTO: 10.3 THOU/MM3 (ref 4.8–10.8)

## 2023-10-27 ENCOUNTER — TELEPHONE (OUTPATIENT)
Dept: UROLOGY | Age: 78
End: 2023-10-27

## 2023-10-27 NOTE — TELEPHONE ENCOUNTER
Please let Ms. Angelica Anand know that her blood work is within normal limits. Very stable. Her urine culture was negative. We are just awaiting the results of her urine cytology which should be back soon.

## 2024-02-05 ENCOUNTER — OFFICE VISIT (OUTPATIENT)
Dept: FAMILY MEDICINE CLINIC | Age: 79
End: 2024-02-05
Payer: MEDICARE

## 2024-02-05 VITALS
BODY MASS INDEX: 33.32 KG/M2 | RESPIRATION RATE: 18 BRPM | HEIGHT: 65 IN | OXYGEN SATURATION: 95 % | WEIGHT: 200 LBS | SYSTOLIC BLOOD PRESSURE: 122 MMHG | HEART RATE: 76 BPM | TEMPERATURE: 97.1 F | DIASTOLIC BLOOD PRESSURE: 66 MMHG

## 2024-02-05 DIAGNOSIS — Z00.00 MEDICARE ANNUAL WELLNESS VISIT, SUBSEQUENT: Primary | ICD-10-CM

## 2024-02-05 PROCEDURE — 1123F ACP DISCUSS/DSCN MKR DOCD: CPT | Performed by: FAMILY MEDICINE

## 2024-02-05 PROCEDURE — G0439 PPPS, SUBSEQ VISIT: HCPCS | Performed by: FAMILY MEDICINE

## 2024-02-05 PROCEDURE — G2211 COMPLEX E/M VISIT ADD ON: HCPCS | Performed by: FAMILY MEDICINE

## 2024-02-05 ASSESSMENT — LIFESTYLE VARIABLES
HOW OFTEN DO YOU HAVE A DRINK CONTAINING ALCOHOL: NEVER
HOW MANY STANDARD DRINKS CONTAINING ALCOHOL DO YOU HAVE ON A TYPICAL DAY: PATIENT DOES NOT DRINK

## 2024-02-05 ASSESSMENT — PATIENT HEALTH QUESTIONNAIRE - PHQ9
5. POOR APPETITE OR OVEREATING: 0
3. TROUBLE FALLING OR STAYING ASLEEP: 0
10. IF YOU CHECKED OFF ANY PROBLEMS, HOW DIFFICULT HAVE THESE PROBLEMS MADE IT FOR YOU TO DO YOUR WORK, TAKE CARE OF THINGS AT HOME, OR GET ALONG WITH OTHER PEOPLE: 0
SUM OF ALL RESPONSES TO PHQ9 QUESTIONS 1 & 2: 0
4. FEELING TIRED OR HAVING LITTLE ENERGY: 0
1. LITTLE INTEREST OR PLEASURE IN DOING THINGS: 0
2. FEELING DOWN, DEPRESSED OR HOPELESS: 0
SUM OF ALL RESPONSES TO PHQ QUESTIONS 1-9: 1
SUM OF ALL RESPONSES TO PHQ QUESTIONS 1-9: 1
9. THOUGHTS THAT YOU WOULD BE BETTER OFF DEAD, OR OF HURTING YOURSELF: 0
6. FEELING BAD ABOUT YOURSELF - OR THAT YOU ARE A FAILURE OR HAVE LET YOURSELF OR YOUR FAMILY DOWN: 1
7. TROUBLE CONCENTRATING ON THINGS, SUCH AS READING THE NEWSPAPER OR WATCHING TELEVISION: 0
SUM OF ALL RESPONSES TO PHQ QUESTIONS 1-9: 1
SUM OF ALL RESPONSES TO PHQ QUESTIONS 1-9: 1

## 2024-02-05 NOTE — PATIENT INSTRUCTIONS
U357 to learn more about \"Starting a Weight Loss Plan: Care Instructions.\"  Current as of: September 20, 2023               Content Version: 13.9  © 2006-2023 [employer].   Care instructions adapted under license by [employer]. If you have questions about a medical condition or this instruction, always ask your healthcare professional. [employer] disclaims any warranty or liability for your use of this information.           A Healthy Heart: Care Instructions  Your Care Instructions     Coronary artery disease, also called heart disease, occurs when a substance called plaque builds up in the vessels that supply oxygen-rich blood to your heart muscle. This can narrow the blood vessels and reduce blood flow. A heart attack happens when blood flow is completely blocked. A high-fat diet, smoking, and other factors increase the risk of heart disease.  Your doctor has found that you have a chance of having heart disease. You can do lots of things to keep your heart healthy. It may not be easy, but you can change your diet, exercise more, and quit smoking. These steps really work to lower your chance of heart disease.  Follow-up care is a key part of your treatment and safety. Be sure to make and go to all appointments, and call your doctor if you are having problems. It's also a good idea to know your test results and keep a list of the medicines you take.  How can you care for yourself at home?  Diet    Use less salt when you cook and eat. This helps lower your blood pressure. Taste food before salting. Add only a little salt when you think you need it. With time, your taste buds will adjust to less salt.     Eat fewer snack items, fast foods, canned soups, and other high-salt, high-fat, processed foods.     Read food labels and try to avoid saturated and trans fats. They increase your risk of heart disease by raising cholesterol levels.     Limit the amount of solid fat-butter, margarine,

## 2024-02-05 NOTE — PROGRESS NOTES
Medicare Annual Wellness Visit    Benita Ayala is here for Medicare AWV    Assessment & Plan   Medicare annual wellness visit, subsequent  Encouraged diet, exercise and weight loss.  Reviewed health maintenance    Recommendations for Preventive Services Due: see orders and patient instructions/AVS.  Recommended screening schedule for the next 5-10 years is provided to the patient in written form: see Patient Instructions/AVS.     Return in 1 year (on 2/5/2025).     Subjective       Patient's complete Health Risk Assessment and screening values have been reviewed and are found in Flowsheets. The following problems were reviewed today and where indicated follow up appointments were made and/or referrals ordered.    Positive Risk Factor Screenings with Interventions:       Cognitive:   Clock Drawing Test (CDT): (!) Abnormal  Words recalled: 3 Words Recalled  Total Score: 3  Total Score Interpretation: Normal Mini-Cog  Interventions:  Patient declines any further evaluation or treatment            Activity, Diet, and Weight:  On average, how many days per week do you engage in moderate to strenuous exercise (like a brisk walk)?: 0 days  On average, how many minutes do you engage in exercise at this level?: 0 min    Do you eat balanced/healthy meals regularly?: Yes    Body mass index is 33.49 kg/m². (!) Abnormal      Inactivity Interventions:  See AVS for additional education material  Obesity Interventions:  See AVS for additional education material              Vision Screen:  Do you have difficulty driving, watching TV, or doing any of your daily activities because of your eyesight?: No  Have you had an eye exam within the past year?: (!) No  No results found.    Interventions:   Patient encouraged to make appointment with their eye specialist      Advanced Directives:  Do you have a Living Will?: (!) No    Intervention:  has NO advanced directive - information provided                     Objective   Vitals:

## 2024-08-04 DIAGNOSIS — F32.A DEPRESSION, UNSPECIFIED DEPRESSION TYPE: ICD-10-CM

## 2024-08-04 DIAGNOSIS — I10 BENIGN ESSENTIAL HTN: ICD-10-CM

## 2024-08-04 DIAGNOSIS — E78.00 HYPERCHOLESTEROLEMIA: ICD-10-CM

## 2024-08-04 DIAGNOSIS — R60.0 PERIPHERAL EDEMA: ICD-10-CM

## 2024-08-04 DIAGNOSIS — F51.01 PRIMARY INSOMNIA: ICD-10-CM

## 2024-08-04 DIAGNOSIS — K21.9 GASTROESOPHAGEAL REFLUX DISEASE, UNSPECIFIED WHETHER ESOPHAGITIS PRESENT: ICD-10-CM

## 2024-08-05 ENCOUNTER — OFFICE VISIT (OUTPATIENT)
Dept: FAMILY MEDICINE CLINIC | Age: 79
End: 2024-08-05
Payer: MEDICARE

## 2024-08-05 VITALS
WEIGHT: 202 LBS | HEIGHT: 64 IN | SYSTOLIC BLOOD PRESSURE: 126 MMHG | TEMPERATURE: 96.6 F | HEART RATE: 68 BPM | OXYGEN SATURATION: 96 % | DIASTOLIC BLOOD PRESSURE: 74 MMHG | RESPIRATION RATE: 18 BRPM | BODY MASS INDEX: 34.49 KG/M2

## 2024-08-05 DIAGNOSIS — N32.81 OAB (OVERACTIVE BLADDER): ICD-10-CM

## 2024-08-05 DIAGNOSIS — N18.30 STAGE 3 CHRONIC KIDNEY DISEASE, UNSPECIFIED WHETHER STAGE 3A OR 3B CKD (HCC): ICD-10-CM

## 2024-08-05 DIAGNOSIS — C64.9 RENAL CELL CARCINOMA, UNSPECIFIED LATERALITY (HCC): ICD-10-CM

## 2024-08-05 DIAGNOSIS — Z00.00 ANNUAL PHYSICAL EXAM: Primary | ICD-10-CM

## 2024-08-05 DIAGNOSIS — F32.A DEPRESSION, UNSPECIFIED DEPRESSION TYPE: ICD-10-CM

## 2024-08-05 DIAGNOSIS — F33.1 MAJOR DEPRESSIVE DISORDER, RECURRENT, MODERATE (HCC): ICD-10-CM

## 2024-08-05 DIAGNOSIS — J45.40 MODERATE PERSISTENT ASTHMA WITHOUT COMPLICATION: ICD-10-CM

## 2024-08-05 DIAGNOSIS — E11.22 TYPE 2 DIABETES MELLITUS WITH CHRONIC KIDNEY DISEASE, WITHOUT LONG-TERM CURRENT USE OF INSULIN, UNSPECIFIED CKD STAGE (HCC): ICD-10-CM

## 2024-08-05 DIAGNOSIS — R60.0 PERIPHERAL EDEMA: ICD-10-CM

## 2024-08-05 DIAGNOSIS — I10 BENIGN ESSENTIAL HTN: ICD-10-CM

## 2024-08-05 DIAGNOSIS — E78.00 HYPERCHOLESTEROLEMIA: ICD-10-CM

## 2024-08-05 DIAGNOSIS — K21.9 GASTROESOPHAGEAL REFLUX DISEASE, UNSPECIFIED WHETHER ESOPHAGITIS PRESENT: ICD-10-CM

## 2024-08-05 DIAGNOSIS — I25.119 ATHEROSCLEROSIS OF NATIVE CORONARY ARTERY OF NATIVE HEART WITH ANGINA PECTORIS (HCC): ICD-10-CM

## 2024-08-05 DIAGNOSIS — H10.33 ACUTE BACTERIAL CONJUNCTIVITIS OF BOTH EYES: ICD-10-CM

## 2024-08-05 LAB
ALBUMIN SERPL BCG-MCNC: 4.2 G/DL (ref 3.5–5.1)
ALP SERPL-CCNC: 105 U/L (ref 38–126)
ALT SERPL W/O P-5'-P-CCNC: < 5 U/L (ref 11–66)
ANION GAP SERPL CALC-SCNC: 13 MEQ/L (ref 8–16)
AST SERPL-CCNC: 13 U/L (ref 5–40)
BASOPHILS ABSOLUTE: 0.1 THOU/MM3 (ref 0–0.1)
BASOPHILS NFR BLD AUTO: 0.7 %
BILIRUB SERPL-MCNC: 0.4 MG/DL (ref 0.3–1.2)
BUN SERPL-MCNC: 11 MG/DL (ref 7–22)
CALCIUM SERPL-MCNC: 8.9 MG/DL (ref 8.5–10.5)
CHLORIDE SERPL-SCNC: 101 MEQ/L (ref 98–111)
CHOLEST SERPL-MCNC: 196 MG/DL (ref 100–199)
CO2 SERPL-SCNC: 27 MEQ/L (ref 23–33)
CREAT SERPL-MCNC: 0.9 MG/DL (ref 0.4–1.2)
DEPRECATED RDW RBC AUTO: 52.3 FL (ref 35–45)
EOSINOPHIL NFR BLD AUTO: 1.2 %
EOSINOPHILS ABSOLUTE: 0.1 THOU/MM3 (ref 0–0.4)
ERYTHROCYTE [DISTWIDTH] IN BLOOD BY AUTOMATED COUNT: 14.6 % (ref 11.5–14.5)
GFR SERPL CREATININE-BSD FRML MDRD: 65 ML/MIN/1.73M2
GLUCOSE SERPL-MCNC: 81 MG/DL (ref 70–108)
HCT VFR BLD AUTO: 41.1 % (ref 37–47)
HDLC SERPL-MCNC: 61 MG/DL
HGB BLD-MCNC: 12.8 GM/DL (ref 12–16)
IMM GRANULOCYTES # BLD AUTO: 0.02 THOU/MM3 (ref 0–0.07)
IMM GRANULOCYTES NFR BLD AUTO: 0.2 %
LDLC SERPL CALC-MCNC: 105 MG/DL
LYMPHOCYTES ABSOLUTE: 3.3 THOU/MM3 (ref 1–4.8)
LYMPHOCYTES NFR BLD AUTO: 34.7 %
MCH RBC QN AUTO: 30.4 PG (ref 26–33)
MCHC RBC AUTO-ENTMCNC: 31.1 GM/DL (ref 32.2–35.5)
MCV RBC AUTO: 97.6 FL (ref 81–99)
MONOCYTES ABSOLUTE: 0.7 THOU/MM3 (ref 0.4–1.3)
MONOCYTES NFR BLD AUTO: 7 %
NEUTROPHILS ABSOLUTE: 5.4 THOU/MM3 (ref 1.8–7.7)
NEUTROPHILS NFR BLD AUTO: 56.2 %
NRBC BLD AUTO-RTO: 0 /100 WBC
PLATELET # BLD AUTO: 252 THOU/MM3 (ref 130–400)
PMV BLD AUTO: 11.7 FL (ref 9.4–12.4)
POTASSIUM SERPL-SCNC: 3.9 MEQ/L (ref 3.5–5.2)
PROT SERPL-MCNC: 6.5 G/DL (ref 6.1–8)
RBC # BLD AUTO: 4.21 MILL/MM3 (ref 4.2–5.4)
SODIUM SERPL-SCNC: 141 MEQ/L (ref 135–145)
TRIGL SERPL-MCNC: 150 MG/DL (ref 0–199)
WBC # BLD AUTO: 9.6 THOU/MM3 (ref 4.8–10.8)

## 2024-08-05 PROCEDURE — 3074F SYST BP LT 130 MM HG: CPT | Performed by: FAMILY MEDICINE

## 2024-08-05 PROCEDURE — 99397 PER PM REEVAL EST PAT 65+ YR: CPT | Performed by: FAMILY MEDICINE

## 2024-08-05 PROCEDURE — 3078F DIAST BP <80 MM HG: CPT | Performed by: FAMILY MEDICINE

## 2024-08-05 PROCEDURE — 99213 OFFICE O/P EST LOW 20 MIN: CPT | Performed by: FAMILY MEDICINE

## 2024-08-05 RX ORDER — CITALOPRAM 20 MG/1
20 TABLET ORAL 2 TIMES DAILY
Qty: 180 TABLET | Refills: 3 | OUTPATIENT
Start: 2024-08-05

## 2024-08-05 RX ORDER — SOLIFENACIN SUCCINATE 5 MG/1
5 TABLET, FILM COATED ORAL DAILY
Qty: 90 TABLET | Refills: 3 | Status: SHIPPED | OUTPATIENT
Start: 2024-08-05

## 2024-08-05 RX ORDER — OMEPRAZOLE 40 MG/1
40 CAPSULE, DELAYED RELEASE ORAL DAILY
Qty: 90 CAPSULE | Refills: 3 | Status: SHIPPED | OUTPATIENT
Start: 2024-08-05

## 2024-08-05 RX ORDER — PROPRANOLOL HYDROCHLORIDE 120 MG/1
120 CAPSULE, EXTENDED RELEASE ORAL DAILY
Qty: 90 CAPSULE | Refills: 3 | Status: SHIPPED | OUTPATIENT
Start: 2024-08-05

## 2024-08-05 RX ORDER — BUDESONIDE AND FORMOTEROL FUMARATE DIHYDRATE 160; 4.5 UG/1; UG/1
AEROSOL RESPIRATORY (INHALATION)
Qty: 30.6 EACH | Refills: 3 | OUTPATIENT
Start: 2024-08-05

## 2024-08-05 RX ORDER — BUDESONIDE AND FORMOTEROL FUMARATE DIHYDRATE 160; 4.5 UG/1; UG/1
AEROSOL RESPIRATORY (INHALATION)
Qty: 3 EACH | Refills: 3 | Status: SHIPPED | OUTPATIENT
Start: 2024-08-05

## 2024-08-05 RX ORDER — SIMVASTATIN 40 MG
40 TABLET ORAL NIGHTLY
Qty: 90 TABLET | Refills: 3 | OUTPATIENT
Start: 2024-08-05

## 2024-08-05 RX ORDER — PROPRANOLOL HYDROCHLORIDE 120 MG/1
CAPSULE, EXTENDED RELEASE ORAL DAILY
Qty: 90 CAPSULE | Refills: 3 | OUTPATIENT
Start: 2024-08-05

## 2024-08-05 RX ORDER — DOXEPIN HYDROCHLORIDE 10 MG/1
CAPSULE ORAL
Qty: 90 CAPSULE | Refills: 3 | OUTPATIENT
Start: 2024-08-05

## 2024-08-05 RX ORDER — CITALOPRAM 20 MG/1
20 TABLET ORAL 2 TIMES DAILY
Qty: 180 TABLET | Refills: 3 | Status: SHIPPED | OUTPATIENT
Start: 2024-08-05

## 2024-08-05 RX ORDER — BUMETANIDE 0.5 MG/1
0.5 TABLET ORAL DAILY
Qty: 90 TABLET | Refills: 3 | OUTPATIENT
Start: 2024-08-05

## 2024-08-05 RX ORDER — POLYMYXIN B SULFATE AND TRIMETHOPRIM 1; 10000 MG/ML; [USP'U]/ML
1 SOLUTION OPHTHALMIC EVERY 6 HOURS
Qty: 1 EACH | Refills: 0 | Status: SHIPPED | OUTPATIENT
Start: 2024-08-05 | End: 2024-08-12

## 2024-08-05 RX ORDER — FAMOTIDINE 20 MG/1
20 TABLET, FILM COATED ORAL 2 TIMES DAILY
Qty: 180 TABLET | Refills: 3 | Status: SHIPPED | OUTPATIENT
Start: 2024-08-05

## 2024-08-05 RX ORDER — SIMVASTATIN 40 MG
40 TABLET ORAL NIGHTLY
Qty: 90 TABLET | Refills: 3 | Status: SHIPPED | OUTPATIENT
Start: 2024-08-05

## 2024-08-05 RX ORDER — OMEPRAZOLE 40 MG/1
CAPSULE, DELAYED RELEASE ORAL DAILY
Qty: 90 CAPSULE | Refills: 3 | OUTPATIENT
Start: 2024-08-05

## 2024-08-05 RX ORDER — ALBUTEROL SULFATE 90 UG/1
AEROSOL, METERED RESPIRATORY (INHALATION)
Qty: 3 EACH | Refills: 3 | Status: SHIPPED | OUTPATIENT
Start: 2024-08-05

## 2024-08-05 RX ORDER — FAMOTIDINE 20 MG/1
20 TABLET, FILM COATED ORAL 2 TIMES DAILY
Qty: 180 TABLET | Refills: 3 | OUTPATIENT
Start: 2024-08-05

## 2024-08-05 RX ORDER — BUMETANIDE 0.5 MG/1
0.5 TABLET ORAL DAILY
Qty: 90 TABLET | Refills: 3 | Status: SHIPPED | OUTPATIENT
Start: 2024-08-05

## 2024-08-05 SDOH — ECONOMIC STABILITY: FOOD INSECURITY: WITHIN THE PAST 12 MONTHS, THE FOOD YOU BOUGHT JUST DIDN'T LAST AND YOU DIDN'T HAVE MONEY TO GET MORE.: NEVER TRUE

## 2024-08-05 SDOH — ECONOMIC STABILITY: INCOME INSECURITY: HOW HARD IS IT FOR YOU TO PAY FOR THE VERY BASICS LIKE FOOD, HOUSING, MEDICAL CARE, AND HEATING?: NOT HARD AT ALL

## 2024-08-05 SDOH — ECONOMIC STABILITY: FOOD INSECURITY: WITHIN THE PAST 12 MONTHS, YOU WORRIED THAT YOUR FOOD WOULD RUN OUT BEFORE YOU GOT MONEY TO BUY MORE.: NEVER TRUE

## 2024-08-05 ASSESSMENT — ENCOUNTER SYMPTOMS
NAUSEA: 0
DIARRHEA: 0
WHEEZING: 0
SHORTNESS OF BREATH: 0
RHINORRHEA: 0
SORE THROAT: 0
VOMITING: 0
CHEST TIGHTNESS: 0
COUGH: 0
BACK PAIN: 0
ABDOMINAL PAIN: 0
EYE REDNESS: 1
EYE PAIN: 0
EYE DISCHARGE: 1
BLOOD IN STOOL: 0
EYE ITCHING: 1
CONSTIPATION: 0

## 2024-08-05 NOTE — TELEPHONE ENCOUNTER
Benita Ayala called requesting a refill on the following medications:  Requested Prescriptions     Pending Prescriptions Disp Refills    budesonide-formoterol (SYMBICORT) 160-4.5 MCG/ACT AERO [Pharmacy Med Name: BUDESONIDE-FORMOTEROL 160-4.5] 30.6 each 3     Sig: TAKE 2 PUFFS BY MOUTH TWICE A DAY       Date of last visit: 2/5/2024  Date of next visit (if applicable):8/5/2024  Date of last refill: 8/3/2024 3 with 3 refills  Pharmacy Name: [facility], [facility]

## 2024-08-05 NOTE — PROGRESS NOTES
2024    Benita Ayala (:  1945) is a 79 y.o. female, here for a preventive medicine evaluation.    Subjective   Patient Active Problem List   Diagnosis    Renal cell carcinoma, unspecified laterality (HCC)    Pharyngoesophageal dysphagia    Hoarseness    History of renal cell cancer    Type 2 diabetes mellitus with microalbuminuria, without long-term current use of insulin (HCC)    Type 2 diabetes mellitus    Chronic renal disease, stage III ([facility]) [[id number]]    Major depressive disorder, recurrent, moderate    Atherosclerotic heart disease of native coronary artery with unspecified angina pectoris    Type 2 diabetes mellitus with chronic kidney disease       Review of Systems   Constitutional:  Negative for chills, fatigue, fever and unexpected weight change.   HENT:  Negative for congestion, ear pain, rhinorrhea and sore throat.    Eyes:  Positive for discharge and itching. Negative for pain and visual disturbance.   Respiratory:  Negative for cough, chest tightness, shortness of breath and wheezing.    Cardiovascular:  Negative for chest pain and palpitations.   Gastrointestinal:  Negative for abdominal pain, blood in stool, constipation, diarrhea, nausea and vomiting.   Genitourinary:  Negative for difficulty urinating, frequency, hematuria and urgency.   Musculoskeletal:  Negative for back pain, joint swelling, myalgias and neck pain.   Skin:  Negative for rash.   Neurological:  Negative for dizziness and headaches.   Hematological:  Negative for adenopathy. Does not bruise/bleed easily.   Psychiatric/Behavioral:  Negative for behavioral problems and sleep disturbance. The patient is not nervous/anxious.        Prior to Visit Medications    Medication Sig Taking? Authorizing Provider   Apoaequorin (PREVAGEN PO) Take by mouth daily Yes Provider, MD Annie   budesonide-formoterol (SYMBICORT) 160-4.5 MCG/ACT AERO TAKE 2 PUFFS BY MOUTH TWICE A DAY Yes Jesus Giron MD   bumetanide (BUMEX) 
Blood work drawn today in the office, venous puncture, left arm, pt tolerated well.  
mouth daily, Disp-90 capsule, R-3Normal  -     CBC with Auto Differential; Future  -     Comprehensive Metabolic Panel; Future  13. Hypercholesterolemia  -     simvastatin (ZOCOR) 40 MG tablet; Take 1 tablet by mouth nightly, Disp-90 tablet, R-3Normal  -     Lipid Panel; Future  -     CBC with Auto Differential; Future  -     Comprehensive Metabolic Panel; Future  14. OAB (overactive bladder)  -     solifenacin (VESICARE) 5 MG tablet; Take 1 tablet by mouth daily, Disp-90 tablet, R-3Normal      No follow-ups on file.       Subjective   HPI   Pt seen today due to a couple of weeks of eye redness, drainage and itching despite taking allergy medications.  Denies fever, chills, or sinus sxs. Reviewed BMI of 34.  Encouraged diet, exercise and weight loss.   , non smoker, pmh reviewed.         Review of Systems   Constitutional:  Negative for chills, fatigue, fever and unexpected weight change.   HENT:  Negative for congestion, ear pain, rhinorrhea and sore throat.    Eyes:  Positive for discharge, redness and itching. Negative for pain and visual disturbance.   Respiratory:  Negative for cough, chest tightness, shortness of breath and wheezing.    Cardiovascular:  Negative for chest pain and palpitations.   Gastrointestinal:  Negative for abdominal pain, blood in stool, constipation, diarrhea, nausea and vomiting.   Genitourinary:  Negative for difficulty urinating, frequency, hematuria and urgency.   Musculoskeletal:  Negative for back pain, joint swelling, myalgias and neck pain.   Skin:  Negative for rash.   Neurological:  Negative for dizziness and headaches.   Hematological:  Negative for adenopathy. Does not bruise/bleed easily.   Psychiatric/Behavioral:  Negative for behavioral problems and sleep disturbance. The patient is not nervous/anxious.           Objective   Physical Exam--see other note             An electronic signature was used to authenticate this note.    --Jesus Giron MD

## 2024-08-05 NOTE — TELEPHONE ENCOUNTER
Benita Ayala called requesting a refill on the following medications:  Requested Prescriptions     Pending Prescriptions Disp Refills    bumetanide (BUMEX) 0.5 MG tablet [Pharmacy Med Name: BUMETANIDE 0.5 MG TABLET] 90 tablet 3     Sig: TAKE 1 TABLET BY MOUTH EVERY DAY    citalopram (CELEXA) 20 MG tablet [Pharmacy Med Name: CITALOPRAM HBR 20 MG TABLET] 180 tablet 3     Sig: TAKE 1 TABLET BY MOUTH TWICE A DAY    propranolol (INDERAL LA) 120 MG extended release capsule [Pharmacy Med Name: PROPRANOLOL  MG CAPSULE] 90 capsule 3     Sig: TAKE 1 CAPSULE BY MOUTH EVERY DAY    omeprazole (PRILOSEC) 40 MG delayed release capsule [Pharmacy Med Name: OMEPRAZOLE DR 40 MG CAPSULE] 90 capsule 3     Sig: TAKE 1 CAPSULE BY MOUTH EVERY DAY    famotidine (PEPCID) 20 MG tablet [Pharmacy Med Name: FAMOTIDINE 20 MG TABLET] 180 tablet 3     Sig: TAKE 1 TABLET BY MOUTH TWICE A DAY    doxepin (SINEQUAN) 10 MG capsule [Pharmacy Med Name: DOXEPIN 10 MG CAPSULE] 90 capsule 3     Sig: TAKE 1 CAPSULE BY MOUTH EVERY DAY AT NIGHT    simvastatin (ZOCOR) 40 MG tablet [Pharmacy Med Name: SIMVASTATIN 40 MG TABLET] 90 tablet 3     Sig: TAKE 1 TABLET BY MOUTH EVERY DAY AT NIGHT       Date of last visit: 2/5/2024  Date of next visit (if applicable):8/5/2024  Date of last refill: 8/3/2024 1 year  Pharmacy Name: [facility], [facility]

## 2024-08-06 ENCOUNTER — HOSPITAL ENCOUNTER (OUTPATIENT)
Dept: WOMENS IMAGING | Age: 79
Discharge: HOME | End: 2024-08-06
Payer: MEDICARE

## 2024-08-06 ENCOUNTER — TELEPHONE (OUTPATIENT)
Dept: FAMILY MEDICINE CLINIC | Age: 79
End: 2024-08-06

## 2024-08-06 DIAGNOSIS — Z12.31 VISIT FOR SCREENING MAMMOGRAM: ICD-10-CM

## 2024-08-06 LAB
DEPRECATED MEAN GLUCOSE BLD GHB EST-ACNC: 108 MG/DL (ref 70–126)
HBA1C MFR BLD HPLC: 5.6 % (ref 4.4–6.4)

## 2024-08-06 PROCEDURE — 77063 BREAST TOMOSYNTHESIS BI: CPT

## 2024-08-06 NOTE — TELEPHONE ENCOUNTER
Pt informed of results and recommendations, Pt voiced understanding and had no further questions.

## 2024-08-06 NOTE — TELEPHONE ENCOUNTER
----- Message from Jesus Giron MD sent at 8/6/2024  6:57 AM EDT -----  CMP is good.  CBC is stable.  A1C is good at 5.6.  Cholesterol at 196 with normal cardiac risk.  Labs are good, continue present.

## 2024-08-13 ENCOUNTER — HOSPITAL ENCOUNTER (OUTPATIENT)
Dept: WOMENS IMAGING | Age: 79
Discharge: HOME | End: 2024-08-13
Attending: RADIOLOGY
Payer: MEDICARE

## 2024-08-13 DIAGNOSIS — R92.8 ABNORMAL MAMMOGRAM: ICD-10-CM

## 2024-08-13 PROCEDURE — 76642 ULTRASOUND BREAST LIMITED: CPT

## 2024-08-13 PROCEDURE — G0279 TOMOSYNTHESIS, MAMMO: HCPCS

## 2024-08-14 ENCOUNTER — TELEPHONE (OUTPATIENT)
Dept: FAMILY MEDICINE CLINIC | Age: 79
End: 2024-08-14

## 2024-08-14 NOTE — TELEPHONE ENCOUNTER
[facility] Monday at 7:30 am. She was told to hold her Aspirin x 3 days before procedure and was told to call our office to see if any of her other medications need to be held before procedure. Please advise.    Sy Adair is a 48 [address]     Bowel obstruction ([address] [facility]     Sukumar Maat MD   buPROPion [facility]) Ml Vasquez MD at [address] [facility] [facility] COLONOSCOPY N/A 8/24/2021    COLONOSCOPY WITH BIOPSY performed by Nini Garces MD at [address], COLON, DIAGNOSTIC      FRACTURE SURGERY      JOINT REPLACEMENT Right     HIP    MANDIBLE SURGERY      MOHS SURGERY      OVARY REMOVAL      OR LUMB SP Jose Perez Right 11/5/2018    T-LIF MIS  RIGHT L45 POSTERIOR SPINAL FUSION performed by Ml Vasquez MD at [address] [facility] Right 10/5/2020    RIGHT HIP TOTAL ARTHROPLASTY MINIMALLY INVASIVE - DEPUY performed by Roel Joya MD at [facility] ENDOSCOPY N/A 8/24/2021    EGD BIOPSY performed by Steven Burton MD at [facility] ENDO       Jayshree Murray DPM

## 2024-08-19 ENCOUNTER — HOSPITAL ENCOUNTER (OUTPATIENT)
Dept: WOMENS IMAGING | Age: 79
Discharge: HOME | End: 2024-08-19
Payer: MEDICARE

## 2024-08-19 ENCOUNTER — HOSPITAL ENCOUNTER (OUTPATIENT)
Dept: WOMENS IMAGING | Age: 79
Discharge: HOME | End: 2024-08-19
Attending: RADIOLOGY
Payer: MEDICARE

## 2024-08-19 DIAGNOSIS — N63.13 MASS OF LOWER OUTER QUADRANT OF RIGHT BREAST: ICD-10-CM

## 2024-08-19 DIAGNOSIS — N63.11 MASS OF UPPER OUTER QUADRANT OF RIGHT BREAST: ICD-10-CM

## 2024-08-19 PROCEDURE — 88305 TISSUE EXAM BY PATHOLOGIST: CPT

## 2024-08-19 PROCEDURE — 19084 BX BREAST ADD LESION US IMAG: CPT

## 2024-08-19 PROCEDURE — 88360 TUMOR IMMUNOHISTOCHEM/MANUAL: CPT

## 2024-08-19 PROCEDURE — G0279 TOMOSYNTHESIS, MAMMO: HCPCS

## 2024-08-19 PROCEDURE — 19083 BX BREAST 1ST LESION US IMAG: CPT

## 2024-08-19 NOTE — PROGRESS NOTES
Breast Biopsy Flowsheet/Post-Operative Care    Date of Procedure: 8/19/2024  Physician: Dr. Garner  Technologist: Rylee Khalil RT(R)(M)    Biopsy:ultrasound guided breast biopsy  Lesion type: Non-palpable  Breast: right    Clock face position: Site #1: LOQ     Site #2: UOQ        Primary Method of Detection: Mammogram      Microcalcification's: no   Distribution: N/A    Asymmetry: asymmetric    Biopsy Method:   Sertera:    Site # 1    Gauge: 14    # of Passes: 4     Clip: U    Sertera:    Site # 2    Gauge: 14    # of Passes: 5     Clip:  Kina         Pre-Op Assessment: (BI-RADS)   4. Suspicious Abnormality    Patient Tolerated Procedure: good  Complications: none  Comments: none    Post Operative Care  Steri strips: Yes  Dressing: Gauze, Tape   Ice Applied to Site:  Yes  Evidence of Bleeding:  No    Pain Verbalized: No      Written Discharge Instructions: Yes  Condition at Discharge: good  Time of Discharge: 0922    Electronically signed by Laura Haddad on 8/19/2024 at 9:19 AM

## 2024-08-19 NOTE — PROGRESS NOTES
[facility]  Pre-Biopsy Assessment      Patient Education    Written information about procedure Yes  right   Procedural steps explained Yes Ultrasound Biopsy   Post-op potential: bruising, hematoma, pain Yes    Self-care: activity, care of dressing Yes    Patient verbalized understanding Yes    Consent signed and witnessed Yes      Hormone Therapy Status: none    Recent Medication: Aspirin Last Dose: 3 days ago                                     Hormone Replacement Therapy: no    Previous Breast Biopsy: yes - stereo right 1995 -benign    Previous Diagnosis Cancer: yes - kidney cancer, found very early, tumor removed from kidney, states oncologist says they got it all and she does not need to be established with them, no chemo or radiation    Hysterectomy:no    Emotional Status: Calm    Language or Physical Barriers: none    Comments: none      Electronically signed by Laura Haddad on 8/19/2024 at 8:25 AM

## 2024-08-20 ENCOUNTER — CLINICAL DOCUMENTATION (OUTPATIENT)
Dept: WOMENS IMAGING | Age: 79
End: 2024-08-20

## 2024-08-20 NOTE — PROGRESS NOTES
Contact [facility]: [facility]    Diagnosis:  IDC grade 1, DCIS grade 2 micropapillary type    Home Disposition: Lives with her     Contact Information: Arrived with her  and daughter, Yvrose,  for biopsy results. Dr. Garner discussed pathology and recommended integrated breast clinic.  Encouraged Breast Clinic attendance.  All cards given for surgeons .       Requests Referral to Doctor(s) with appointment(s): undecided, she states her sister went to Dr. Kitchen    Additional Referral(s): Karolina Hernandez/Lexi Campbell: [facility],        Integrated breast cancer clinic appointment: 8/23/24  8:10 am    Biopsy site status: doing well, no issues    Teaching Sheets provided: Cancer Type: Invasive Ductal, What is Breast Cancer, Tumor Markers, Needle Localization, [place] Lymph Node Surgery, Lumpectomy/Mastectomy Comparison, Radiation Therapy, Breast Cancer Navigation Packet, Nurse Navigation contact information, Breast Clinic appointment and map.    Currently on HRT: no    If yes, was patient instructed to stop immediately: N/A     Notes: Explained terms doctor and navigators will discuss at next appointments. Questions addressed and encouraged patient to ask [facility]. Will receive call within 24 hrs. Referral emailed to Navigation.    Answered “yes” on Genetic Risk Assessment: sister and mom both had breast cancer, both > 50     Additional information: hx renal cell carcinoma in 2014.  Had partial nephrectomy, Jesika grade 2 Clear cell carcinoma, neg. Margins, pT1a - also has stage 3 Chronic Renal disease, type 2 DM, atherosclerotic heart disease    Results Faxed to:  Dr. Giron    20

## 2024-08-21 ENCOUNTER — TELEPHONE (OUTPATIENT)
Dept: CASE MANAGEMENT | Age: 79
End: 2024-08-21

## 2024-08-21 NOTE — TELEPHONE ENCOUNTER
Name: Benita Ayala  : 1945  MRN: [id number]    Oncology Navigation Follow-Up Note    Contact Type:  Telephone    Notes: Patient called about car still in garage and wants to set up teaching via phone. Wants to wait until tomorrow. Will call patient at 11 am. Asked her to have education packet available. Asked if able to attend breast clinic Friday if still no car. States yes my daughter is coming from out of town to take me.     Electronically signed by Lexi Campbell RN on 2024 at 1:38 PM

## 2024-08-21 NOTE — TELEPHONE ENCOUNTER
Name: Benita Ayala  : 1945  MRN: [id number]    Oncology Navigation Follow-Up Note    Contact Type:  Telephone    Notes: Called patient to introduce myself and explain nurse navigation role.Tried to set up in-person teaching  session but states vehicle getting fixed and not sure if can come. Offered options to teach over phone or can call me when finds out about car. States she will call and let me know.     Electronically signed by Lexi Campbell RN on 2024 at 9:20 AM

## 2024-08-22 ENCOUNTER — CLINICAL DOCUMENTATION (OUTPATIENT)
Dept: CASE MANAGEMENT | Age: 79
End: 2024-08-22

## 2024-08-22 NOTE — PROGRESS NOTES
Name: Benita Ayala  : 1945  MRN: [id number]    Oncology Navigation- Initial Note:    Intake-  Contact Type: Telephone-teaching with nurse navigator    Diagnosis: Breast-malignant, right IDC, grade 1 and DCIS, grade 2 ER+ OK+ HER2-, site #2 \"U clip\" right benign, fibrocystic change    Home Disposition: Lives with other who is unable to assist,  Ismael currently has health issues he needs care at home for by patient and son. Patient states son lives close by and assists both of them at times. Daughter Yvrose (lives by [place]) coming home to attend breast clinic with patient and  tomorrow. Other daughter located in [place].    Patient needs and barriers to care: Coordination of Care, Knowledge deficit, Emotional Issues/ Fear/ Anxiety, Practical needs/ Family problems (housing/ living alone), and Transportation, vehicle now fixed, but children can drive her to appts if needed. Expresses feeling overwhelmed with \"'s health issues, son had wrist surgery Monday so can't assist as much as before. We are managing but have a lot going on between all of us.\"      Referral Source: Outside Provider    Receptive to Advanced Care Planning/ Palliative Care:  deferred, clinical stage 1    Interventions-      General Interventions:  Patient unable to come for face to face teaching. Called patient to complete initial teaching via phone to accommodate. States she has been reading packet and has it with her now. States sister really liked Dr. Kitchen. No decision on surgeon yet but will let me know tomorrow.    Genetics/Family History: Reviewed and updated patient's history of renal cell cancer (confined to left kidney per path report) age 69 (treated with left nephrectomy, no RT or chemo recommended per Dr. Mack's dictation from 2015), history of colon polyps (2-4 in her lifetime, gets colonoscopies every 3 yrs), and current right breast cancer age 79 and family history as follows: mother breast

## 2024-08-23 ENCOUNTER — CLINICAL DOCUMENTATION (OUTPATIENT)
Dept: CASE MANAGEMENT | Age: 79
End: 2024-08-23

## 2024-08-23 ENCOUNTER — TELEPHONE (OUTPATIENT)
Dept: SURGERY | Age: 79
End: 2024-08-23

## 2024-08-23 DIAGNOSIS — Z17.0 MALIGNANT NEOPLASM OF UPPER-OUTER QUADRANT OF RIGHT BREAST IN FEMALE, ESTROGEN RECEPTOR POSITIVE (HCC): ICD-10-CM

## 2024-08-23 DIAGNOSIS — C50.411 MALIGNANT NEOPLASM OF UPPER-OUTER QUADRANT OF RIGHT BREAST IN FEMALE, ESTROGEN RECEPTOR POSITIVE (HCC): ICD-10-CM

## 2024-08-23 DIAGNOSIS — Z86.010 HISTORY OF COLON POLYPS: ICD-10-CM

## 2024-08-23 DIAGNOSIS — Z85.528 HISTORY OF RENAL CELL CANCER: Primary | ICD-10-CM

## 2024-08-23 DIAGNOSIS — Z80.3 FAMILY HISTORY OF BREAST CANCER: ICD-10-CM

## 2024-08-23 NOTE — PROGRESS NOTES
Name: Benita Ayala  : 1945  MRN: [id number]    Oncology Navigation Follow-Up Note    Contact Type:   [facility]    Providers present during Breast Conference: Imtiaz Lau, Ella, Bishop Bobo, Katia, Noemí     Providers present during Breast Clinic: Bishop Del Valle Kallam     Treatment Recommendations: Genetic Counseling and testing, lumpectomy, omit SLN (axilla negative by imaging per Dr. Garner), discuss option for Oncotype DX, tumor greater than 1 cm *patient indicated may not want (1.4 cm per Dr. Dowd on pathology), radiation therapy-to be determined with final pathology-may be able to omit, AI x 5 years.      General Interventions: Patient arrived with  Ismael and daughter Yvrose to discuss treatment recommendations with breast team. Wants referrals to Dr. Kitchen and Dr. Crow (both treated her sister).     Education/Screenings: Breast Clinic Recommendation form completed, discussed, and copy given to patient. Additional teaching and clarification provided about pre-op information: No sentinel node needed, and possibly no radiation depending on surgical pathology. Reinforced teaching about tumor marker results and purpose of antiestrogen therapy. Informed patient Dr. Crow may have further discussion about Oncotype DX testing. Patient verbalizes understanding and denies questions at this time. Scanned Breast Clinic form in media.     Referrals:  Dr. Kitchen-scheduled for  at noon, Nancy Stout Genetic Counselor ([facility])-genetics office will call patient to schedule remote consult by telehealth or phone. Urgent referral faxed for surgical planning to N with patient's signed authorization to release medical records, referral order, demographics, pathology, and pedigree. Pending referral to Dr. Corw-office closed on , will call  and fax records then.      Notes: Daughter and  stayed in lobby. Escorted patient [place] to lab dept for genetic

## 2024-08-23 NOTE — TELEPHONE ENCOUNTER
Patient has an appointment with Dr. Kitchen on Monday, 08- for surgical consult of newly diagnosed right breast cancer.  Patient is not scheduled to see Dr. Collazo until 10-.  Is there any way to get that appointment moved up for a preop clearance?  Thank you

## 2024-08-23 NOTE — PROGRESS NOTES
Name: Benita Ayala  : 1945  MRN: [id number]    Oncology Navigation Follow-Up Note    Contact Type:   Genetic lab    Notes: Genetic lab sample collected by . Verified labeled with patient's information per protocol. Navigator packaged for shipping with demographics/insurance, pathology, and pedigree. Scheduled [employer] pickup for today, tracking number scanned in media for reference. Nancy Girish will order test after pre-counseling completed. Escorted patient downstairs to family. Reminded patient she will receive call from genetic office to schedule her with genetic counseling-patient's appt preference-by phone, not Zoom.     Electronically signed by Lexi Campbell RN on 2024 at 12:17 PM

## 2024-08-26 ENCOUNTER — CLINICAL DOCUMENTATION (OUTPATIENT)
Dept: CASE MANAGEMENT | Age: 79
End: 2024-08-26

## 2024-08-26 ENCOUNTER — OFFICE VISIT (OUTPATIENT)
Dept: SURGERY | Age: 79
End: 2024-08-26
Payer: MEDICARE

## 2024-08-26 VITALS
TEMPERATURE: 97.5 F | BODY MASS INDEX: 34.56 KG/M2 | HEIGHT: 64 IN | WEIGHT: 202.4 LBS | HEART RATE: 85 BPM | OXYGEN SATURATION: 97 % | SYSTOLIC BLOOD PRESSURE: 144 MMHG | DIASTOLIC BLOOD PRESSURE: 80 MMHG

## 2024-08-26 DIAGNOSIS — Z17.0 MALIGNANT NEOPLASM OF UPPER-OUTER QUADRANT OF RIGHT BREAST IN FEMALE, ESTROGEN RECEPTOR POSITIVE (HCC): ICD-10-CM

## 2024-08-26 DIAGNOSIS — R80.9 TYPE 2 DIABETES MELLITUS WITH MICROALBUMINURIA, WITHOUT LONG-TERM CURRENT USE OF INSULIN (HCC): ICD-10-CM

## 2024-08-26 DIAGNOSIS — N18.31 STAGE 3A CHRONIC KIDNEY DISEASE (HCC): ICD-10-CM

## 2024-08-26 DIAGNOSIS — E11.22 TYPE 2 DIABETES MELLITUS WITH CHRONIC KIDNEY DISEASE, WITHOUT LONG-TERM CURRENT USE OF INSULIN, UNSPECIFIED CKD STAGE (HCC): ICD-10-CM

## 2024-08-26 DIAGNOSIS — E11.29 TYPE 2 DIABETES MELLITUS WITH MICROALBUMINURIA, WITHOUT LONG-TERM CURRENT USE OF INSULIN (HCC): ICD-10-CM

## 2024-08-26 DIAGNOSIS — Z85.528 HISTORY OF RENAL CELL CANCER: Primary | ICD-10-CM

## 2024-08-26 DIAGNOSIS — C50.411 MALIGNANT NEOPLASM OF UPPER-OUTER QUADRANT OF RIGHT BREAST IN FEMALE, ESTROGEN RECEPTOR POSITIVE (HCC): ICD-10-CM

## 2024-08-26 PROCEDURE — 99205 OFFICE O/P NEW HI 60 MIN: CPT | Performed by: SURGERY

## 2024-08-26 PROCEDURE — 1123F ACP DISCUSS/DSCN MKR DOCD: CPT | Performed by: SURGERY

## 2024-08-26 PROCEDURE — 3044F HG A1C LEVEL LT 7.0%: CPT | Performed by: SURGERY

## 2024-08-26 ASSESSMENT — ENCOUNTER SYMPTOMS
EYE PAIN: 0
VOMITING: 0
CONSTIPATION: 0
EYE ITCHING: 0
NAUSEA: 0
FACIAL SWELLING: 0
BACK PAIN: 0
SORE THROAT: 0
COUGH: 0
PHOTOPHOBIA: 0
RHINORRHEA: 0
CHEST TIGHTNESS: 0
ABDOMINAL DISTENTION: 0
COLOR CHANGE: 0
VOICE CHANGE: 0
ANAL BLEEDING: 0
DIARRHEA: 0
APNEA: 0
RECTAL PAIN: 0
SHORTNESS OF BREATH: 0
EYE REDNESS: 0
BLOOD IN STOOL: 0
EYE DISCHARGE: 0
STRIDOR: 0
WHEEZING: 0
TROUBLE SWALLOWING: 0
CHOKING: 0
SINUS PAIN: 0
ABDOMINAL PAIN: 0
SINUS PRESSURE: 0

## 2024-08-26 NOTE — PROGRESS NOTES
Name: Benita Ayala  : 1945  MRN: [id number]    Oncology Navigation Follow-Up Note    Contact Type:  Telephone    Notes: Referral called to Mendy at Dr. Crow's office. Faxed records with treatment recommendations from [facility] on .    Electronically signed by Lexi Campbell RN on 2024 at 11:34 AM

## 2024-08-26 NOTE — PROGRESS NOTES
in voice recognition technology.** Electronically signed by Dr. Saman Garner    US BREAST BIOPSY W LOC DEVICE EACH ADDL LESION RIGHT    Result Date: 8/19/2024  1. Successful right breast ultrasound guided core needle biopsy x2. 2. Successful marker clip placement with confirmation by digital diagnostic mammogram at 2 sites. Waiting for pathology results. A final report will be issued when these become available. BI-RADS Final Assessment Category: Waiting for pathology. Management Recommendation: Assess radiologic/pathologic concordance. **This report has been created using voice recognition software. It may contain minor errors which are inherent in voice recognition technology.** Electronically signed by Dr. Saman Garner        Past Medical History  Past Medical History:   Diagnosis Date    Anxiety     Arthritis     Asthma     Atherosclerotic heart disease of native coronary artery with unspecified angina pectoris 08/03/2023    Breast cancer, right (HCC) 08/19/2024    IDC with DCIS ER+ NE+ HER2-    Cancer (HCC)     Depression     Diabetes mellitus (HCC)     Headache(784.0)     History of colon polyps     2-4 in lifetime, gets high risk screening colonoscopy every 3 yrs    Hyperlipidemia     Hypertension     Nausea & vomiting     Persistent proteinuria associated with type 2 diabetes mellitus (HCC)     Renal cell carcinoma of left kidney (HCC) 11/21/2014    Tx: surgery only, partial left nephrectomy    Unspecified cerebral artery occlusion with cerebral infarction        Past Surgical History  Past Surgical History:   Procedure Laterality Date    CARDIAC CATHETERIZATION      CHOLECYSTECTOMY      COLONOSCOPY  01/05/2021    ENDOSCOPY, COLON, DIAGNOSTIC      FINGER SURGERY  tumor    HERNIA REPAIR      HIATAL HERNIA REPAIR  07/31/2013    stomach repair    GUILLE STEROTACTIC LOC BREAST BIOPSY RIGHT Right 2000    benign    GUILLE US GUID NDL BIOPSY RIGHT Right 08/19/2024    GUILLE US GUID NDL BIOPSY RIGHT 8/19/2024 [facility]  negative.    OBJECTIVE    VITALS:  vitals were not taken for this visit.   CONSTITUTIONAL: Alert and oriented times 3, no acute distress and cooperative to examination with proper mood and affect.  SKIN: Skin color, texture, turgor normal. No rashes or lesions.  LYMPH: no cervical nodes, no supraclavicular nodes, no axillary nodes  HEENT: Head is normocephalic, atraumatic. EOMI, PERRLA.  NECK: Supple, symmetrical, trachea midline, no adenopathy, thyroid symmetric, not enlarged and no tenderness, skin normal.   BREAST: Right breast is examined there is 2 ecchymotic areas 1 upper outer 1 in the lower outer.  No hematomas palpated no skin changes no skin dimpling there is no tenderness on palpation I cannot really feel biopsy site or mass.  Axilla is clinically negative left breast is examined symmetrical no dominant masses no suspicious findings no skin changes.   CHEST/LUNGS: chest symmetric with normal A/P diameter, normal respiratory rate and rhythm, lungs clear to auscultation without wheezes, rales or rhonchi. No accessory muscle use. Scars None   CARDIOVASCULAR: Heart sounds are normal.  Regular rate and rhythm without murmur, gallop or rub. Normal S1 and S2.. Carotid and femoral pulses 2+/4 and equal bilaterally.  ABDOMEN: Normal shape.  Midline incision and Laparoscopic scar(s) present. Normal bowel sounds.  No bruits. . \"soft, nontender, nondistended, no masses or organomegaly. no evidence of hernia. Percussion: Normal without hepatosplenomegally. Tenderness: absent.  RECTAL: deferred, not clinically indicated  NEUROLOGIC: There are no focalizing motor or sensory deficits. CN II-XII are grossly intact..    EXTREMITIES: no cyanosis, no clubbing, and no edema.               Electronically signed by Patel Kitchen MD on 8/26/2024 at 8:30 AM    The patients records and films were reviewed independently.  We reviewed her imaging films both ultrasound and mammography in the office with the patient.  We reviewed

## 2024-08-26 NOTE — PROGRESS NOTES
Subjective   Patient ID: Benita Ayala is a 79 y.o. female.  Chief Complaint   Patient presents with    Surgical Consult     NP refer [facility]-Right breast invasive ductal carcinoma       HPI    Review of Systems   Constitutional:  Negative for activity change, appetite change, chills, diaphoresis, fatigue, fever and unexpected weight change.   HENT:  Negative for congestion, dental problem, drooling, ear discharge, ear pain, facial swelling, hearing loss, mouth sores, nosebleeds, postnasal drip, rhinorrhea, sinus pressure, sinus pain, sneezing, sore throat, tinnitus, trouble swallowing and voice change.    Eyes:  Negative for photophobia, pain, discharge, redness, itching and visual disturbance.   Respiratory:  Negative for apnea, cough, choking, chest tightness, shortness of breath, wheezing and stridor.    Cardiovascular:  Negative for chest pain, palpitations and leg swelling.   Gastrointestinal:  Negative for abdominal distention, abdominal pain, anal bleeding, blood in stool, constipation, diarrhea, nausea, rectal pain and vomiting.   Endocrine: Negative for cold intolerance, heat intolerance, polydipsia, polyphagia and polyuria.   Genitourinary:  Negative for decreased urine volume, difficulty urinating, dyspareunia, dysuria, enuresis, flank pain, frequency, genital sores, hematuria, menstrual problem, pelvic pain, urgency, vaginal bleeding, vaginal discharge and vaginal pain.   Musculoskeletal:  Negative for arthralgias, back pain, gait problem, joint swelling, myalgias, neck pain and neck stiffness.   Skin:  Negative for color change, pallor, rash and wound.   Allergic/Immunologic: Negative for environmental allergies, food allergies and immunocompromised state.   Neurological:  Negative for dizziness, tremors, seizures, syncope, facial asymmetry, speech difficulty, weakness, light-headedness, numbness and headaches.   Hematological:  Negative for adenopathy. Does not bruise/bleed easily.    Psychiatric/Behavioral:  Negative for agitation, behavioral problems, confusion, decreased concentration, dysphoric mood, hallucinations, self-injury, sleep disturbance and suicidal ideas. The patient is not nervous/anxious and is not hyperactive.    All other systems reviewed and are negative.         Objective   Physical Exam   BP (!) 144/80 (Site: Right Upper Arm, Position: Sitting, Cuff Size: Medium Adult)   Pulse 85   Temp 97.5 °F (36.4 °C) (Temporal)   Ht 1.633 m (5' 4.29\")   Wt 91.8 kg (202 lb 6.4 oz)   SpO2 97%   BMI 34.43 kg/m²       Assessment         Plan           Tiffanie Maya, CMA

## 2024-08-30 ENCOUNTER — TELEPHONE (OUTPATIENT)
Dept: SURGERY | Age: 79
End: 2024-08-30

## 2024-08-30 ENCOUNTER — OFFICE VISIT (OUTPATIENT)
Dept: CARDIOLOGY CLINIC | Age: 79
End: 2024-08-30
Payer: MEDICARE

## 2024-08-30 VITALS
SYSTOLIC BLOOD PRESSURE: 130 MMHG | BODY MASS INDEX: 34.45 KG/M2 | DIASTOLIC BLOOD PRESSURE: 84 MMHG | HEART RATE: 74 BPM | WEIGHT: 201.8 LBS | HEIGHT: 64 IN

## 2024-08-30 DIAGNOSIS — Z01.818 PREOP TESTING: Primary | ICD-10-CM

## 2024-08-30 DIAGNOSIS — E78.01 FAMILIAL HYPERCHOLESTEROLEMIA: ICD-10-CM

## 2024-08-30 DIAGNOSIS — I10 PRIMARY HYPERTENSION: ICD-10-CM

## 2024-08-30 DIAGNOSIS — I25.10 CORONARY ARTERY DISEASE INVOLVING NATIVE CORONARY ARTERY OF NATIVE HEART WITHOUT ANGINA PECTORIS: ICD-10-CM

## 2024-08-30 PROCEDURE — 1123F ACP DISCUSS/DSCN MKR DOCD: CPT | Performed by: NUCLEAR MEDICINE

## 2024-08-30 PROCEDURE — 3075F SYST BP GE 130 - 139MM HG: CPT | Performed by: NUCLEAR MEDICINE

## 2024-08-30 PROCEDURE — 99214 OFFICE O/P EST MOD 30 MIN: CPT | Performed by: NUCLEAR MEDICINE

## 2024-08-30 PROCEDURE — 93000 ELECTROCARDIOGRAM COMPLETE: CPT | Performed by: NUCLEAR MEDICINE

## 2024-08-30 PROCEDURE — 3079F DIAST BP 80-89 MM HG: CPT | Performed by: NUCLEAR MEDICINE

## 2024-08-30 NOTE — TELEPHONE ENCOUNTER
Surgery Scheduling Form   [facility] [address], [place] [address]    Phone * [phone]   Surgical Scheduling Direct line Phone * [phone]  Fax * [phone]      Benita Ayala      1945    female    [address] [place] [address]   Marital Status:         Home Phone: [phone]   Cell Phone:   Telephone Information:   Mobile [phone]              Surgeon: Dr. Kitchen  Surgery Date:09-   Time: JORGE Walton     Procedure: Right breast lumpectomy with preop needle localization     Outpatient     Diagnosis: Right breast cancer    Important Medical History: In Epic    Special Inst/Equip:     CPT Codes: 34653    Latex Allergy:   yes, Hives Cardiac Device:  no    Anesthesia Type: MAC/TIVA    Case Location:  Main OR     Preadmission Testing: Phone Call      PAT Date and Time: ________________________________    PAT Confirmation #: _________________________________    Post Op Visit:  ______________________________________    Need Preop Cardiac Clearance:   yes,     Does patient have Cardiologist/physician? Done per Dr. Collazo      Surgery Conformation #:  ______________________________________________    : __________________________________ Date:____________________        Insurance Company Name:  [employer]

## 2024-08-30 NOTE — PROGRESS NOTES
[facility]  [facility] CARDIOLOGY  [address]  [address]  [place] [address]  Dept: [phone]  Dept Fax: [phone]  Loc: [phone]    Visit Date: 8/30/2024    Benita Ayala is a 79 y.o. female who presents todayfor:  Chief Complaint   Patient presents with    Cardiac Clearance     Lumpectomy    Coronary Artery Disease    Hypertension    Hyperlipidemia   Going for breast cancer surgery   Cath a while back   Mild CAD  Stress test 2020  No chest pain   Baseline dyspnea  No changes in breathing  Limited patient   Limited by arthritis  Know mild HTn   No dizziness  No syncope  On statins for hyperlipidemia  No issues with the meds         HPI:  HPI  Past Medical History:   Diagnosis Date    Anxiety     Arthritis     Asthma     Atherosclerotic heart disease of native coronary artery with unspecified angina pectoris 08/03/2023    Breast cancer, right (HCC) 08/19/2024    IDC with DCIS ER+ MO+ HER2-    Cancer (HCC)     Depression     Diabetes mellitus (HCC)     Headache(784.0)     History of colon polyps     2-4 in lifetime, gets high risk screening colonoscopy every 3 yrs    Hyperlipidemia     Hypertension     Nausea & vomiting     Persistent proteinuria associated with type 2 diabetes mellitus (HCC)     Renal cell carcinoma of left kidney (HCC) 11/21/2014    Tx: surgery only, partial left nephrectomy    Unspecified cerebral artery occlusion with cerebral infarction       Past Surgical History:   Procedure Laterality Date    CARDIAC CATHETERIZATION      CHOLECYSTECTOMY      COLONOSCOPY  01/05/2021    ENDOSCOPY, COLON, DIAGNOSTIC      FINGER SURGERY  tumor    HERNIA REPAIR      HIATAL HERNIA REPAIR  07/31/2013    stomach repair    GUILLE STEROTACTIC LOC BREAST BIOPSY RIGHT Right 2000    benign    GUILLE US GUID NDL BIOPSY RIGHT Right 8/19/2024    GUILLE US GUID NDL BIOPSY RIGHT 8/19/2024 Lui Garner MD [facility]    PARTIAL NEPHRECTOMY Left 11/21/2014    robotic  left partial nephrectomy    TONSILLECTOMY      TUBAL LIGATION      US BREAST BIOPSY NEEDLE ADDITIONAL RIGHT Right 8/19/2024    US BREAST BIOPSY NEEDLE ADDITIONAL RIGHT 8/19/2024 Lui Garner MD [facility]     Family History   Problem Relation Age of Onset    Heart Disease Mother     High Blood Pressure Mother     High Cholesterol Mother     Stroke Mother     Breast Cancer Mother 75    Heart Disease Father     High Blood Pressure Father     High Cholesterol Father     Lung Cancer Father     Breast Cancer Sister 65    Skin Cancer Sister 72        squamous cell x 2     Social History     Tobacco Use    Smoking status: Never    Smokeless tobacco: Never   Substance Use Topics    Alcohol use: No      Current Outpatient Medications   Medication Sig Dispense Refill    Apoaequorin (PREVAGEN PO) Take by mouth daily      budesonide-formoterol (SYMBICORT) 160-4.5 MCG/ACT AERO TAKE 2 PUFFS BY MOUTH TWICE A DAY 3 each 3    bumetanide (BUMEX) 0.5 MG tablet Take 1 tablet by mouth daily 90 tablet 3    citalopram (CELEXA) 20 MG tablet Take 1 tablet by mouth 2 times daily 180 tablet 3    famotidine (PEPCID) 20 MG tablet Take 1 tablet by mouth 2 times daily 180 tablet 3    omeprazole (PRILOSEC) 40 MG delayed release capsule Take 1 capsule by mouth daily 90 capsule 3    propranolol (INDERAL LA) 120 MG extended release capsule Take 1 capsule by mouth daily 90 capsule 3    simvastatin (ZOCOR) 40 MG tablet Take 1 tablet by mouth nightly 90 tablet 3    solifenacin (VESICARE) 5 MG tablet Take 1 tablet by mouth daily 90 tablet 3    VENTOLIN  (90 Base) MCG/ACT inhaler INHALE 2 PUFFS BY MOUTH 4 TIMES A DAY AS NEEDED 3 each 3    triamcinolone (KENALOG) 0.1 % cream Apply topically 2 times daily Apply topically 2 times daily.      LORazepam (ATIVAN) 0.5 MG tablet Take 1 tablet by mouth as needed for Anxiety.      nitroGLYCERIN (NITROSTAT) 0.4 MG SL tablet Place 1 tablet under the tongue every 5 minutes as needed for Chest

## 2024-08-30 NOTE — TELEPHONE ENCOUNTER
Patient scheduled for surgery with Dr. Kitchen on 09- at 9:30am with an arrival of 6:45am at [facility] for preop needle localization.  Preop instructions given to patient. Surgery consent to be signed.  Antibacterial soap given.

## 2024-09-03 ENCOUNTER — TELEPHONE (OUTPATIENT)
Dept: SURGERY | Age: 79
End: 2024-09-03

## 2024-09-03 NOTE — TELEPHONE ENCOUNTER
Per Chastity    Service Type  2 - Surgical  Place of Service  22 - On [facility]  Service From - To Date  2024-09-05 - 2024-09-05    Level of Service  Elective    Diagnosis Code 1  [id number] - Malig neoplm of upper-outer quadrant of right female breast    Procedure Code 1 (CPT/HCPCS)  63248 - PARTIAL MASTECTOMY  Procedure From - To Date  2024-09-05 - 2024-09-05    Modifier 1  78 - Unplanned Return to the Operating/Procedure Room by the Same Physician or Other Qualified Health Care Professional Following Initial Procedure for a Related Procedure During the Postoperative Period    Status  NO ACTION REQUIRED  Message  CERTIFICATION NOT REQUIRED FOR THIS SERVICE

## 2024-09-04 NOTE — H&P
([employer] PROBIOTIC) CAPS Take by mouth      diclofenac sodium 1 % GEL Apply 2 g topically 4 times daily 2 Tube 1    acetaminophen (TYLENOL) 500 MG tablet Take 1 tablet by mouth every 6 hours as needed for Pain      Cholecalciferol (VITAMIN D3) 5000 units TABS Take by mouth daily       aspirin 81 MG tablet Take 1 tablet by mouth daily      fexofenadine (ALLEGRA) 180 MG tablet Take 1 tablet by mouth daily      Multiple Vitamin (MULTI-VITAMINS PO) Take  by mouth daily.       Facility-Administered Medications Ordered in Other Encounters   Medication Dose Route Frequency Provider Last Rate Last Admin    cefOXitin (MEFOXIN) 2 g in dextrose 5% 50 mL (mini-bag)  2 g IntraVENous Once Ismael Dial MD         Allergies  Allergies   Allergen Reactions    Latex Hives     Patient noted in preop that she might be allergic - maybe breaking out when exposed.    Demerol Hcl [Meperidine] Nausea And Vomiting     confusion    Propoxyphene     Tape [Adhesive Tape]        Social History  Social History     Socioeconomic History    Marital status:      Spouse name: Not on file    Number of children: Not on file    Years of education: Not on file    Highest education level: Not on file   Occupational History    Not on file   Tobacco Use    Smoking status: Never    Smokeless tobacco: Never   Vaping Use    Vaping status: Never Used   Substance and Sexual Activity    Alcohol use: No    Drug use: No    Sexual activity: Not on file   Other Topics Concern    Not on file   Social History Narrative    Not on file     Social Determinants of Health     Financial Resource Strain: Low Risk  (8/5/2024)    Overall Financial Resource Strain (CARDIA)     Difficulty of Paying Living Expenses: Not hard at all   Food Insecurity: No Food Insecurity (8/5/2024)    Hunger Vital Sign     Worried About Running Out of Food in the Last Year: Never true     Ran Out of Food in the Last Year: Never true   Transportation Needs: Unknown (8/5/2024)    PRAPARE -  tinnitus, trouble swallowing and voice change.    Eyes:  Negative for photophobia, pain, discharge, redness, itching and visual disturbance.   Respiratory:  Negative for apnea, cough, choking, chest tightness, shortness of breath, wheezing and stridor.    Cardiovascular:  Negative for chest pain, palpitations and leg swelling.   Gastrointestinal:  Negative for abdominal distention, abdominal pain, anal bleeding, blood in stool, constipation, diarrhea, nausea, rectal pain and vomiting.   Endocrine: Negative for cold intolerance, heat intolerance, polydipsia, polyphagia and polyuria.   Genitourinary:  Negative for decreased urine volume, difficulty urinating, dyspareunia, dysuria, enuresis, flank pain, frequency, genital sores, hematuria, menstrual problem, pelvic pain, urgency, vaginal bleeding, vaginal discharge and vaginal pain.   Musculoskeletal:  Negative for arthralgias, back pain, gait problem, joint swelling, myalgias, neck pain and neck stiffness.   Skin:  Negative for color change, pallor, rash and wound.   Allergic/Immunologic: Negative for environmental allergies, food allergies and immunocompromised state.   Neurological:  Negative for dizziness, tremors, seizures, syncope, facial asymmetry, speech difficulty, weakness, light-headedness, numbness and headaches.   Hematological:  Negative for adenopathy. Does not bruise/bleed easily.   Psychiatric/Behavioral:  Negative for agitation, behavioral problems, confusion, decreased concentration, dysphoric mood, hallucinations, self-injury, sleep disturbance and suicidal ideas. The patient is not nervous/anxious and is not hyperactive.    All other systems reviewed and are negative.    OBJECTIVE    VITALS:  vitals were not taken for this visit.   CONSTITUTIONAL: Alert and oriented times 3, no acute distress and cooperative to examination with proper mood and affect.  SKIN: Skin color, texture, turgor normal. No rashes or lesions.  LYMPH: no cervical nodes, no

## 2024-09-05 ENCOUNTER — HOSPITAL ENCOUNTER (OUTPATIENT)
Dept: WOMENS IMAGING | Age: 79
Discharge: HOME | End: 2024-09-05
Attending: RADIOLOGY
Payer: MEDICARE

## 2024-09-05 ENCOUNTER — HOSPITAL ENCOUNTER (OUTPATIENT)
Age: 79
Setting detail: OUTPATIENT SURGERY
Discharge: HOME | End: 2024-09-05
Attending: SURGERY | Admitting: SURGERY
Payer: MEDICARE

## 2024-09-05 ENCOUNTER — ANESTHESIA EVENT (OUTPATIENT)
Dept: OPERATING ROOM | Age: 79
End: 2024-09-05
Payer: MEDICARE

## 2024-09-05 ENCOUNTER — HOSPITAL ENCOUNTER (OUTPATIENT)
Dept: WOMENS IMAGING | Age: 79
Discharge: HOME | End: 2024-09-05
Payer: MEDICARE

## 2024-09-05 ENCOUNTER — HOSPITAL ENCOUNTER (OUTPATIENT)
Dept: WOMENS IMAGING | Age: 79
Discharge: HOME | End: 2024-09-05
Attending: SURGERY
Payer: MEDICARE

## 2024-09-05 ENCOUNTER — ANESTHESIA (OUTPATIENT)
Dept: OPERATING ROOM | Age: 79
End: 2024-09-05
Payer: MEDICARE

## 2024-09-05 VITALS
TEMPERATURE: 97.6 F | OXYGEN SATURATION: 98 % | SYSTOLIC BLOOD PRESSURE: 168 MMHG | DIASTOLIC BLOOD PRESSURE: 75 MMHG | HEART RATE: 65 BPM | RESPIRATION RATE: 16 BRPM | HEIGHT: 65 IN | WEIGHT: 202 LBS | BODY MASS INDEX: 33.66 KG/M2

## 2024-09-05 DIAGNOSIS — C50.911 MALIGNANT NEOPLASM OF RIGHT FEMALE BREAST, UNSPECIFIED ESTROGEN RECEPTOR STATUS, UNSPECIFIED SITE OF BREAST (HCC): ICD-10-CM

## 2024-09-05 DIAGNOSIS — C50.411 MALIGNANT NEOPLASM OF UPPER-OUTER QUADRANT OF RIGHT BREAST IN FEMALE, ESTROGEN RECEPTOR POSITIVE (HCC): ICD-10-CM

## 2024-09-05 DIAGNOSIS — Z17.0 MALIGNANT NEOPLASM OF UPPER-OUTER QUADRANT OF RIGHT BREAST IN FEMALE, ESTROGEN RECEPTOR POSITIVE (HCC): ICD-10-CM

## 2024-09-05 DIAGNOSIS — C50.911 INVASIVE DUCTAL CARCINOMA OF RIGHT BREAST (HCC): ICD-10-CM

## 2024-09-05 DIAGNOSIS — Z98.890 S/P LUMPECTOMY, RIGHT BREAST: Primary | ICD-10-CM

## 2024-09-05 LAB — POTASSIUM SERPL-SCNC: 3.6 MEQ/L (ref 3.5–5.2)

## 2024-09-05 PROCEDURE — 2500000003 HC RX 250 WO HCPCS: Performed by: SURGERY

## 2024-09-05 PROCEDURE — 6360000002 HC RX W HCPCS: Performed by: REGISTERED NURSE

## 2024-09-05 PROCEDURE — 3700000001 HC ADD 15 MINUTES (ANESTHESIA): Performed by: SURGERY

## 2024-09-05 PROCEDURE — 6370000000 HC RX 637 (ALT 250 FOR IP): Performed by: SURGERY

## 2024-09-05 PROCEDURE — 2580000003 HC RX 258: Performed by: SURGERY

## 2024-09-05 PROCEDURE — 2709999900 US PLACE BREAST LOC DEVICE 1ST LESION RIGHT

## 2024-09-05 PROCEDURE — 88307 TISSUE EXAM BY PATHOLOGIST: CPT

## 2024-09-05 PROCEDURE — 7100000011 HC PHASE II RECOVERY - ADDTL 15 MIN: Performed by: SURGERY

## 2024-09-05 PROCEDURE — 2500000003 HC RX 250 WO HCPCS: Performed by: REGISTERED NURSE

## 2024-09-05 PROCEDURE — 3700000000 HC ANESTHESIA ATTENDED CARE: Performed by: SURGERY

## 2024-09-05 PROCEDURE — 19301 PARTIAL MASTECTOMY: CPT | Performed by: SURGERY

## 2024-09-05 PROCEDURE — 2709999900 HC NON-CHARGEABLE SUPPLY: Performed by: SURGERY

## 2024-09-05 PROCEDURE — 3600000002 HC SURGERY LEVEL 2 BASE: Performed by: SURGERY

## 2024-09-05 PROCEDURE — 84132 ASSAY OF SERUM POTASSIUM: CPT

## 2024-09-05 PROCEDURE — 6360000002 HC RX W HCPCS: Performed by: SURGERY

## 2024-09-05 PROCEDURE — 3600000012 HC SURGERY LEVEL 2 ADDTL 15MIN: Performed by: SURGERY

## 2024-09-05 PROCEDURE — 77065 DX MAMMO INCL CAD UNI: CPT

## 2024-09-05 PROCEDURE — 76098 X-RAY EXAM SURGICAL SPECIMEN: CPT

## 2024-09-05 PROCEDURE — 36415 COLL VENOUS BLD VENIPUNCTURE: CPT

## 2024-09-05 PROCEDURE — 7100000010 HC PHASE II RECOVERY - FIRST 15 MIN: Performed by: SURGERY

## 2024-09-05 RX ORDER — ONDANSETRON 2 MG/ML
INJECTION INTRAMUSCULAR; INTRAVENOUS PRN
Status: DISCONTINUED | OUTPATIENT
Start: 2024-09-05 | End: 2024-09-05 | Stop reason: SDUPTHER

## 2024-09-05 RX ORDER — LIDOCAINE HYDROCHLORIDE 20 MG/ML
INJECTION, SOLUTION INTRAVENOUS PRN
Status: DISCONTINUED | OUTPATIENT
Start: 2024-09-05 | End: 2024-09-05 | Stop reason: SDUPTHER

## 2024-09-05 RX ORDER — IBUPROFEN 400 MG/1
400 TABLET, FILM COATED ORAL ONCE
Status: DISCONTINUED | OUTPATIENT
Start: 2024-09-05 | End: 2024-09-05 | Stop reason: HOSPADM

## 2024-09-05 RX ORDER — MIDAZOLAM HYDROCHLORIDE 1 MG/ML
INJECTION INTRAMUSCULAR; INTRAVENOUS PRN
Status: DISCONTINUED | OUTPATIENT
Start: 2024-09-05 | End: 2024-09-05 | Stop reason: SDUPTHER

## 2024-09-05 RX ORDER — PROPOFOL 10 MG/ML
INJECTION, EMULSION INTRAVENOUS CONTINUOUS PRN
Status: DISCONTINUED | OUTPATIENT
Start: 2024-09-05 | End: 2024-09-05 | Stop reason: SDUPTHER

## 2024-09-05 RX ORDER — ACETAMINOPHEN 500 MG
1000 TABLET ORAL ONCE
Status: COMPLETED | OUTPATIENT
Start: 2024-09-05 | End: 2024-09-05

## 2024-09-05 RX ORDER — HYDROCODONE BITARTRATE AND ACETAMINOPHEN 5; 325 MG/1; MG/1
1 TABLET ORAL EVERY 6 HOURS PRN
Qty: 12 TABLET | Refills: 0 | Status: SHIPPED | OUTPATIENT
Start: 2024-09-05 | End: 2024-09-08

## 2024-09-05 RX ORDER — SODIUM CHLORIDE 0.9 % (FLUSH) 0.9 %
5-40 SYRINGE (ML) INJECTION PRN
Status: DISCONTINUED | OUTPATIENT
Start: 2024-09-05 | End: 2024-09-05 | Stop reason: HOSPADM

## 2024-09-05 RX ORDER — FENTANYL CITRATE 50 UG/ML
INJECTION, SOLUTION INTRAMUSCULAR; INTRAVENOUS PRN
Status: DISCONTINUED | OUTPATIENT
Start: 2024-09-05 | End: 2024-09-05 | Stop reason: SDUPTHER

## 2024-09-05 RX ORDER — SODIUM CHLORIDE 9 MG/ML
INJECTION, SOLUTION INTRAVENOUS PRN
Status: DISCONTINUED | OUTPATIENT
Start: 2024-09-05 | End: 2024-09-05 | Stop reason: HOSPADM

## 2024-09-05 RX ORDER — SODIUM CHLORIDE 0.9 % (FLUSH) 0.9 %
5-40 SYRINGE (ML) INJECTION EVERY 12 HOURS SCHEDULED
Status: DISCONTINUED | OUTPATIENT
Start: 2024-09-05 | End: 2024-09-05 | Stop reason: HOSPADM

## 2024-09-05 RX ORDER — DEXAMETHASONE SODIUM PHOSPHATE 10 MG/ML
INJECTION, EMULSION INTRAMUSCULAR; INTRAVENOUS PRN
Status: DISCONTINUED | OUTPATIENT
Start: 2024-09-05 | End: 2024-09-05 | Stop reason: SDUPTHER

## 2024-09-05 RX ORDER — SODIUM CHLORIDE 9 MG/ML
INJECTION, SOLUTION INTRAVENOUS CONTINUOUS
Status: DISCONTINUED | OUTPATIENT
Start: 2024-09-05 | End: 2024-09-05 | Stop reason: HOSPADM

## 2024-09-05 RX ADMIN — LIDOCAINE HYDROCHLORIDE 100 MG: 20 INJECTION, SOLUTION INTRAVENOUS at 09:47

## 2024-09-05 RX ADMIN — Medication 10 MG: at 09:47

## 2024-09-05 RX ADMIN — Medication 10 MG: at 10:03

## 2024-09-05 RX ADMIN — ACETAMINOPHEN 1000 MG: 500 TABLET ORAL at 08:23

## 2024-09-05 RX ADMIN — FENTANYL CITRATE 25 MCG: 50 INJECTION, SOLUTION INTRAMUSCULAR; INTRAVENOUS at 10:03

## 2024-09-05 RX ADMIN — WATER 2000 MG: 1 INJECTION INTRAMUSCULAR; INTRAVENOUS; SUBCUTANEOUS at 09:47

## 2024-09-05 RX ADMIN — ONDANSETRON 4 MG: 2 INJECTION INTRAMUSCULAR; INTRAVENOUS at 09:53

## 2024-09-05 RX ADMIN — WATER 882 MG: 1 INJECTION INTRAMUSCULAR; INTRAVENOUS; SUBCUTANEOUS at 09:55

## 2024-09-05 RX ADMIN — PROPOFOL 50 MCG/KG/MIN: 10 INJECTION, EMULSION INTRAVENOUS at 09:53

## 2024-09-05 RX ADMIN — SODIUM CHLORIDE: 9 INJECTION, SOLUTION INTRAVENOUS at 08:20

## 2024-09-05 RX ADMIN — FENTANYL CITRATE 25 MCG: 50 INJECTION, SOLUTION INTRAMUSCULAR; INTRAVENOUS at 09:47

## 2024-09-05 RX ADMIN — DEXAMETHASONE SODIUM PHOSPHATE 10 MG: 10 INJECTION, EMULSION INTRAMUSCULAR; INTRAVENOUS at 09:47

## 2024-09-05 RX ADMIN — MIDAZOLAM 1 MG: 1 INJECTION INTRAMUSCULAR; INTRAVENOUS at 09:47

## 2024-09-05 ASSESSMENT — PAIN - FUNCTIONAL ASSESSMENT: PAIN_FUNCTIONAL_ASSESSMENT: 0-10

## 2024-09-05 ASSESSMENT — PAIN SCALES - GENERAL: PAINLEVEL_OUTOF10: 0

## 2024-09-05 NOTE — PROGRESS NOTES
Pt returned to \[facility]\"" room 20. Vitals and assessment as charted. 0.9 infusing, to count from PACU. Pt has crackers and water. Family at the bedside. Pt and family verbalized understanding of discharge criteria and call light use. Call light in reach.

## 2024-09-05 NOTE — DISCHARGE INSTRUCTIONS
[facility]      DR. HONG'S DISCHARGE INSTRUCTIONS    Pt Name: Benita Ayala  Medical Record Number: [id number]  Today's Date: 9/5/2024           GENERAL ANESTHESIA OR SEDATION:    [x]  Do not drive or operate hazardous machinery for 24 hours.  [x] Do not make important business or personal decisions for 24 hours.  [x] Do not drink alcoholic beverages or use tobacco for 24 hours.           ACTIVITY INSTRUCTIONS:    [] Rest today. Resume light to normal activity tomorrow.   [x] You may resume normal activity tomorrow. Do not engage in strenuous activity that may place stress on your incision.  [x] Do not drive  UNTIL NO LONGER TAKING NARCOTICS AND DAILY ACTIVITIES  ARE NEARLY NORMAL     [x]Avoid heavy lifting, tugging, pullings greater  than  25 lbs until seen in the office.               DIET INSTRUCTIONS:    []Begin with clear liquids. If not nauseated, may increase to a low-fat diet when you desire. Greasy and spicy foods are not advised.  [x]Regular diet as tolerated.  []Other:          MEDICATIONS  [x]Prescription sent with you to be used as directed.   []Lortab   [x]Norco   []Percocet   []Tylenol #3   []NONE              []Antibiotic              []Tramadol       Do not drink alcohol or drive while taking these medications.   You may experience dizziness or drowsiness with these medications.   You may also experience constipation which can be relieved with stool softners or laxatives.    [x]You may resume your daily prescription medication schedule unless otherwise specified.  []Do not take 325mg Aspirin or other blood thinners such as Coumadin or Plavix for 5 days.            WOUND/DRESSING INSTRUCTIONS:    Always ensure you and your care giver clean hands before and after caring for the wound.    [] Keep dressing clean and dry for 24 hours.       [] Allow steri-strips to fall off on their own.   [] Ice operative site for 20 minutes 4 times a day.     [x] May wash over incision in shower, but  do not soak in a bath for 5 days.  [] Take sitz bath for 20 minutes twice daily and after bowel movements.  [] Keep the abdominal binder in place during the day. May remove to shower and at night.  [] Remove packing from wound in 24 hours and replace with AMD dressings daily.  [] Empty KJ drain daily and record the amounts.      BREAST PROCEDURES    []Following a breast procedure, it is important to continue to wear supportive garments.  []Following a sentinal lymph node biopsy, you should not be alarmed if your urine has a blue color to it. This is your body eliminating the dye used for the procedure.      ABDOMINAL/LAPAROSCOPIC SURGERY    [x]You are encouraged to get up and move around as this helps with the circulation and speeds up the healing process.  [x]Breath deeply and cough from time to time. This helps to clear your lungs and helps prevent pneumonia.  [x]Supporting your incision with a pillow or your hand helps to minimize discomfort and pain.  []Laparoscopic patients may develop shoulder pain in the first 48 hours from the gas used during the procedure.      FOLLOW-UP CARE. SPECIFICALLY WATCH FOR:  Call Your Doctor If Any of the Following Occurs:    Monitor your recovery once you leave the hospital.  As soon as you have a problem, alert your doctor.  If any of the following occur, call your doctor:  Signs of infection, including fever and chills.  Redness, swelling, increasing pain, excessive bleeding, or discharge at the incision site.  Cough, shortness of breath, chest pain.  Increased abdominal pain.  Pain that you cannot control with the medicines you have been given.  Blood in the stool.  Nausea and/or vomiting that you cannot control with the medicines you were given after surgery, or which persist for more than two days after discharge from the hospital.  Bloating and gas that persist for more than a month.  Pain, burning, urgency or frequency of urination, inability to urinate or blood in the

## 2024-09-05 NOTE — H&P
H and P for Surgery           [facility]  History and Physical Update    Pt Name: Benita Ayala  MRN: [id number]  YOB: 1945  Date of evaluation: 9/5/2024    [x] I have examined the patient and reviewed the H&P/Consult and there are no changes to the patient or plans.    [] I have examined the patient and reviewed the H&P/Consult and have noted the following changes:        Patel Kitchen MD  Electronically signed 9/5/2024 at 9:14 AM

## 2024-09-05 NOTE — PROGRESS NOTES

## 2024-09-05 NOTE — PROGRESS NOTES
Contact Type:  [facility]    Contact Information: Arrived with  and son for needle localization. Having breast surgery today with Dr. Kitchen.    Diagnosis: Invasive Ductal Carcinoma    Patient Expresses Need(s) For: n/a     Teaching Sheets/Booklets Provided: n/a    Notes: Procedure explained and questions addressed as needed.  Dr. Garner placed wire. Secured with gauze and tape per protocol. Gift given.  Transported patient with belongings to Same Day Surgery via wheelchair at 0740.

## 2024-09-05 NOTE — ANESTHESIA PRE PROCEDURE
MD Annie   diclofenac sodium 1 % GEL Apply 2 g topically 4 times daily 5/20/19  Yes Randal Kuo MD   acetaminophen (TYLENOL) 500 MG tablet Take 1 tablet by mouth every 6 hours as needed for Pain   Yes Annie Jordan MD   Cholecalciferol (VITAMIN D3) 5000 units TABS Take by mouth daily    Yes Annie Jordan MD   aspirin 81 MG tablet Take 1 tablet by mouth daily   Yes Annie Jordan MD   fexofenadine (ALLEGRA) 180 MG tablet Take 1 tablet by mouth daily   Yes Annie Jordan MD   Multiple Vitamin (MULTI-VITAMINS PO) Take  by mouth daily.   Yes Annie Jordan MD   LORazepam (ATIVAN) 0.5 MG tablet Take 1 tablet by mouth as needed for Anxiety.    Annie Jordan MD   nitroGLYCERIN (NITROSTAT) 0.4 MG SL tablet Place 1 tablet under the tongue every 5 minutes as needed for Chest pain 7/10/19   Wiley Jaffe MD       Current medications:    Current Facility-Administered Medications   Medication Dose Route Frequency Provider Last Rate Last Admin   • ibuprofen (ADVIL;MOTRIN) tablet 400 mg  400 mg Oral Once Patel Kitchen MD       • 0.9 % sodium chloride infusion   IntraVENous Continuous Patel Kitchen  mL/hr at 09/05/24 0820 Restarted at 09/05/24 0947   • sodium chloride flush 0.9 % injection 5-40 mL  5-40 mL IntraVENous 2 times per day Patel Kitchen MD       • sodium chloride flush 0.9 % injection 5-40 mL  5-40 mL IntraVENous PRN Patel Kitchen MD       • 0.9 % sodium chloride infusion   IntraVENous PRN Patel Kitchen MD         Facility-Administered Medications Ordered in Other Encounters   Medication Dose Route Frequency Provider Last Rate Last Admin   • cefOXitin (MEFOXIN) 2 g in dextrose 5% 50 mL (mini-bag)  2 g IntraVENous Once Ismael Dial MD           Allergies:    Allergies   Allergen Reactions   • Latex Hives     Patient noted in preop that she might be allergic - maybe breaking out when exposed.   • Demerol Hcl [Meperidine]

## 2024-09-05 NOTE — PROGRESS NOTES
Patient oriented to Same Day department and admitted to Same Day Surgery room 20.   Patient verbalized approval for first name, last initial with physician name on unit whiteboard.     Plan of care reviewed with patient.   Patient room whiteboard filled out and discussed with patient and responsible adult.   Patient and responsible adult offered Same Day Welcome Packet to review.    Call light in reach.   Bed in lowest position, locked, with one bed rail up.   SCDs and warming blanket in place.  Appropriate arm bands on patient.   Bathroom offered.   All questions and concerns of patient addressed.        Meds to Beds:   Patient informed of [facility] Meds to Beds program during admission. Patient is agreeable to program.   Contact information for the pharmacy and the Meds to Beds program:   Name: Benita   Relationship to patient:patient   Phone number: same day

## 2024-09-05 NOTE — OP NOTE
[facility]  Operative Report    PATIENT NAME: Benita Ayala  MEDICAL RECORD NO. [id number]  SURGEON: Patel Kitchen MD MD FACS  Primary Care Physician: Jesus Giron MD  Date: 9/5/2024, 10:21 AM     PROCEDURE PERFORMED: Right  Lumpectomy   PREOPERATIVE DIAGNOSIS: Right   Active Hospital Problems    Diagnosis Date Noted    Malignant neoplasm of upper-outer quadrant of right breast in female, estrogen receptor positive (HCC) [C50.411, Z17.0] 08/26/2024     POSTOPERATIVE DIAGNOSIS: Same, path pending  SURGEON:  Patel Kitchen MD MD FACS  ANESTHESIA:  Monitored Local Anesthesia with Sedation  ESTIMATED BLOOD LOSS:  10  ml  SPECIMEN:  Breast and axillary contents  COMPLICATIONS:  None; patient tolerated the procedure well.  DRAINS: none  DISPOSITION: {Outpatient Surgery  CONDITION: stable      Indications: This patient presents with history of    Active Hospital Problems    Diagnosis Date Noted    Malignant neoplasm of upper-outer quadrant of right breast in female, estrogen receptor positive (HCC) [C50.411, Z17.0] 08/26/2024         Procedure Details   The patient was seen in the Holding Area. The risks, benefits, complications, treatment options, and expected outcomes had been previously discussed with the patient. The possibilities of reaction to medication, pulmonary aspiration, bleeding, infection, the need for additional procedures, failure to diagnose a condition, and creating a complication requiring transfusion or operation were discussed with the patient. The patient concurred with the proposed plan, giving informed consent.  The site of surgery properly noted/marked.   The patient was brought to the operating room, placed supine on the operating room table, time, was taken, preoperative antibiotics were given, and signed consent on the chart. Sequential compression devices were in place. Previous needle localization had been performed if required.  We turned our attention to the

## 2024-09-05 NOTE — ANESTHESIA POSTPROCEDURE EVALUATION
Department of Anesthesiology  Postprocedure Note    Patient: Benita Ayala  MRN: [id number]  YOB: 1945  Date of evaluation: 9/5/2024    Procedure Summary       Date: 09/05/24 Room / Location: [facility] OR  / [facility] OR    Anesthesia Start: 0947 Anesthesia Stop: 1032    Procedure: Right Breast Lumpectomy with Preop Needle Localization (Breast) Diagnosis:       Malignant neoplasm of right female breast, unspecified estrogen receptor status, unspecified site of breast (HCC)      (Malignant neoplasm of right female breast, unspecified estrogen receptor status, unspecified site of breast (HCC) [C50.911])    Surgeons: Patel Kitchen MD Responsible Provider: Layo Alvarez DO    Anesthesia Type: TIVA ASA Status: 2            Anesthesia Type: No value filed.    Humberto Phase I: Humberto Score: 10    Humberto Phase II: Humberto Score: 10    Anesthesia Post Evaluation    Comments: [facility]  POST-ANESTHESIA NOTE       Name:  Benita Ayala                                         Age:  79 y.o.  MRN:  [id number]      Last Vitals:  BP (!) 170/77   Pulse 66   Temp 97.6 °F (36.4 °C) (Temporal)   Resp 16   Ht 1.651 m (5' 5\")   Wt 91.6 kg (202 lb)   SpO2 98%   BMI 33.61 kg/m²   Patient Vitals in the past 4 hrs:  09/05/24 1115, BP:(!) 170/77, Pulse:66, Resp:16, SpO2:98 %  09/05/24 1045, BP:(!) 160/72, Temp:97.6 °F (36.4 °C), Temp src:Temporal, Pulse:72, Resp:16, SpO2:95 %  09/05/24 0812, BP:(!) 192/93, Temp:97.3 °F (36.3 °C), Temp src:Temporal, Pulse:68, Resp:18, SpO2:95 %, Height:1.651 m (5' 5\"), Weight:91.6 kg (202 lb)    Level of Consciousness:  Awake    Respiratory:  Stable    Oxygen Saturation:  Stable    Cardiovascular:  Stable    Hydration:  Adequate    PONV:  Stable    Post-op Pain:  Adequate analgesia    Post-op Assessment:  No apparent anesthetic complications    Additional Follow-Up / Treatment / Comment:  None    Layo Alvarez DO  September 5, 2024   12:01 PM      No notable events

## 2024-09-06 ENCOUNTER — TELEPHONE (OUTPATIENT)
Dept: SURGERY | Age: 79
End: 2024-09-06

## 2024-09-06 NOTE — TELEPHONE ENCOUNTER
Post procedural phone call placed to patient. Patient not requiring  prescribed medication with adequate control of pain. Patient encouraged to utilize cold therapy to help with redness and swelling. Patient able to readback education. Patients follow up appointment verified and confirmed in chart. Time spent for patient questions. Patient to follow up with office as needed.

## 2024-09-11 ENCOUNTER — OFFICE VISIT (OUTPATIENT)
Dept: SURGERY | Age: 79
End: 2024-09-11

## 2024-09-11 VITALS
HEIGHT: 65 IN | SYSTOLIC BLOOD PRESSURE: 146 MMHG | OXYGEN SATURATION: 94 % | BODY MASS INDEX: 34.29 KG/M2 | HEART RATE: 66 BPM | WEIGHT: 205.8 LBS | TEMPERATURE: 96.7 F | DIASTOLIC BLOOD PRESSURE: 82 MMHG

## 2024-09-11 DIAGNOSIS — Z17.0 MALIGNANT NEOPLASM OF UPPER-OUTER QUADRANT OF RIGHT BREAST IN FEMALE, ESTROGEN RECEPTOR POSITIVE (HCC): Primary | ICD-10-CM

## 2024-09-11 DIAGNOSIS — C50.411 MALIGNANT NEOPLASM OF UPPER-OUTER QUADRANT OF RIGHT BREAST IN FEMALE, ESTROGEN RECEPTOR POSITIVE (HCC): Primary | ICD-10-CM

## 2024-09-11 DIAGNOSIS — Z98.890 S/P LUMPECTOMY, RIGHT BREAST: ICD-10-CM

## 2024-09-11 PROCEDURE — 99024 POSTOP FOLLOW-UP VISIT: CPT | Performed by: SURGERY

## 2024-09-25 ENCOUNTER — HOSPITAL ENCOUNTER (OUTPATIENT)
Dept: RADIATION ONCOLOGY | Age: 79
Discharge: HOME | End: 2024-09-25
Payer: MEDICARE

## 2024-09-25 VITALS
TEMPERATURE: 97.8 F | HEIGHT: 65 IN | SYSTOLIC BLOOD PRESSURE: 126 MMHG | BODY MASS INDEX: 34.32 KG/M2 | DIASTOLIC BLOOD PRESSURE: 64 MMHG | RESPIRATION RATE: 18 BRPM | HEART RATE: 73 BPM | WEIGHT: 206 LBS | OXYGEN SATURATION: 93 %

## 2024-09-25 PROCEDURE — 99202 OFFICE O/P NEW SF 15 MIN: CPT | Performed by: RADIOLOGY

## 2024-09-25 PROCEDURE — 99214 OFFICE O/P EST MOD 30 MIN: CPT

## 2024-09-25 ASSESSMENT — ENCOUNTER SYMPTOMS
ABDOMINAL PAIN: 0
RECTAL PAIN: 0
DIARRHEA: 0
TROUBLE SWALLOWING: 0
NAUSEA: 0
BACK PAIN: 1
SHORTNESS OF BREATH: 0
BLOOD IN STOOL: 0
COUGH: 1

## 2024-09-26 ENCOUNTER — SOCIAL WORK (OUTPATIENT)
Dept: RADIATION ONCOLOGY | Age: 79
End: 2024-09-26

## 2024-10-02 ENCOUNTER — HOSPITAL ENCOUNTER (OUTPATIENT)
Dept: WOMENS IMAGING | Age: 79
Discharge: HOME | End: 2024-10-02
Attending: INTERNAL MEDICINE
Payer: MEDICARE

## 2024-10-02 DIAGNOSIS — Z78.0 POSTMENOPAUSAL: ICD-10-CM

## 2024-10-02 PROCEDURE — 77080 DXA BONE DENSITY AXIAL: CPT

## 2024-10-09 ENCOUNTER — HOSPITAL ENCOUNTER (OUTPATIENT)
Dept: CT IMAGING | Age: 79
Discharge: HOME | End: 2024-10-09

## 2024-10-09 ENCOUNTER — HOSPITAL ENCOUNTER (OUTPATIENT)
Dept: RADIATION ONCOLOGY | Age: 79
Discharge: HOME | End: 2024-10-09
Payer: MEDICARE

## 2024-10-09 DIAGNOSIS — C50.411 MALIGNANT NEOPLASM OF UPPER-OUTER QUADRANT OF RIGHT BREAST IN FEMALE, ESTROGEN RECEPTOR POSITIVE (HCC): ICD-10-CM

## 2024-10-09 DIAGNOSIS — Z17.0 MALIGNANT NEOPLASM OF UPPER-OUTER QUADRANT OF RIGHT BREAST IN FEMALE, ESTROGEN RECEPTOR POSITIVE (HCC): ICD-10-CM

## 2024-10-09 PROCEDURE — 77334 RADIATION TREATMENT AID(S): CPT | Performed by: RADIOLOGY

## 2024-10-09 PROCEDURE — 3209999900 CT GUIDE RADIATION THERAPY NO CHARGE

## 2024-10-10 ENCOUNTER — HOSPITAL ENCOUNTER (OUTPATIENT)
Dept: ULTRASOUND IMAGING | Age: 79
Discharge: HOME | End: 2024-10-10
Payer: MEDICARE

## 2024-10-10 ENCOUNTER — HOSPITAL ENCOUNTER (OUTPATIENT)
Age: 79
Discharge: HOME | End: 2024-10-10
Payer: MEDICARE

## 2024-10-10 DIAGNOSIS — C64.9 RENAL CELL CARCINOMA, UNSPECIFIED LATERALITY (HCC): ICD-10-CM

## 2024-10-10 LAB
ALBUMIN SERPL BCG-MCNC: 3.8 G/DL (ref 3.5–5.1)
ALP SERPL-CCNC: 110 U/L (ref 38–126)
ALT SERPL W/O P-5'-P-CCNC: 7 U/L (ref 11–66)
ANION GAP SERPL CALC-SCNC: 10 MEQ/L (ref 8–16)
AST SERPL-CCNC: 12 U/L (ref 5–40)
BASOPHILS ABSOLUTE: 0.1 THOU/MM3 (ref 0–0.1)
BASOPHILS NFR BLD AUTO: 0.6 %
BILIRUB SERPL-MCNC: 0.5 MG/DL (ref 0.3–1.2)
BUN SERPL-MCNC: 15 MG/DL (ref 7–22)
CALCIUM SERPL-MCNC: 9 MG/DL (ref 8.5–10.5)
CHLORIDE SERPL-SCNC: 99 MEQ/L (ref 98–111)
CO2 SERPL-SCNC: 28 MEQ/L (ref 23–33)
CREAT SERPL-MCNC: 0.8 MG/DL (ref 0.4–1.2)
DEPRECATED RDW RBC AUTO: 46.7 FL (ref 35–45)
EOSINOPHIL NFR BLD AUTO: 1.8 %
EOSINOPHILS ABSOLUTE: 0.2 THOU/MM3 (ref 0–0.4)
ERYTHROCYTE [DISTWIDTH] IN BLOOD BY AUTOMATED COUNT: 13.6 % (ref 11.5–14.5)
GFR SERPL CREATININE-BSD FRML MDRD: 75 ML/MIN/1.73M2
GLUCOSE SERPL-MCNC: 108 MG/DL (ref 70–108)
HCT VFR BLD AUTO: 40.5 % (ref 37–47)
HGB BLD-MCNC: 13.1 GM/DL (ref 12–16)
IMM GRANULOCYTES # BLD AUTO: 0.02 THOU/MM3 (ref 0–0.07)
IMM GRANULOCYTES NFR BLD AUTO: 0.2 %
LYMPHOCYTES ABSOLUTE: 2.5 THOU/MM3 (ref 1–4.8)
LYMPHOCYTES NFR BLD AUTO: 28.3 %
MCH RBC QN AUTO: 30.5 PG (ref 26–33)
MCHC RBC AUTO-ENTMCNC: 32.3 GM/DL (ref 32.2–35.5)
MCV RBC AUTO: 94.4 FL (ref 81–99)
MONOCYTES ABSOLUTE: 0.7 THOU/MM3 (ref 0.4–1.3)
MONOCYTES NFR BLD AUTO: 8.1 %
NEUTROPHILS ABSOLUTE: 5.3 THOU/MM3 (ref 1.8–7.7)
NEUTROPHILS NFR BLD AUTO: 61 %
NRBC BLD AUTO-RTO: 0 /100 WBC
PLATELET # BLD AUTO: 238 THOU/MM3 (ref 130–400)
PMV BLD AUTO: 11.5 FL (ref 9.4–12.4)
POTASSIUM SERPL-SCNC: 4.1 MEQ/L (ref 3.5–5.2)
PROT SERPL-MCNC: 6.3 G/DL (ref 6.1–8)
RBC # BLD AUTO: 4.29 MILL/MM3 (ref 4.2–5.4)
SODIUM SERPL-SCNC: 137 MEQ/L (ref 135–145)
WBC # BLD AUTO: 8.7 THOU/MM3 (ref 4.8–10.8)

## 2024-10-10 PROCEDURE — 85025 COMPLETE CBC W/AUTO DIFF WBC: CPT

## 2024-10-10 PROCEDURE — 80053 COMPREHEN METABOLIC PANEL: CPT

## 2024-10-10 PROCEDURE — 76770 US EXAM ABDO BACK WALL COMP: CPT

## 2024-10-10 PROCEDURE — 36415 COLL VENOUS BLD VENIPUNCTURE: CPT

## 2024-10-17 ENCOUNTER — OFFICE VISIT (OUTPATIENT)
Dept: UROLOGY | Age: 79
End: 2024-10-17
Payer: MEDICARE

## 2024-10-17 VITALS
DIASTOLIC BLOOD PRESSURE: 72 MMHG | SYSTOLIC BLOOD PRESSURE: 138 MMHG | WEIGHT: 206 LBS | BODY MASS INDEX: 34.32 KG/M2 | HEIGHT: 65 IN

## 2024-10-17 DIAGNOSIS — R31.29 MICROSCOPIC HEMATURIA: ICD-10-CM

## 2024-10-17 DIAGNOSIS — R35.0 URINARY FREQUENCY: ICD-10-CM

## 2024-10-17 DIAGNOSIS — N30.00 ACUTE CYSTITIS WITHOUT HEMATURIA: ICD-10-CM

## 2024-10-17 DIAGNOSIS — C64.9 RENAL CELL CARCINOMA, UNSPECIFIED LATERALITY (HCC): ICD-10-CM

## 2024-10-17 DIAGNOSIS — N32.81 OAB (OVERACTIVE BLADDER): Primary | ICD-10-CM

## 2024-10-17 LAB
BILIRUBIN, URINE: NEGATIVE
BLOOD URINE, POC: ABNORMAL
CHARACTER, URINE: CLEAR
COLOR, UA: YELLOW
GLUCOSE URINE: NEGATIVE MG/DL
KETONES, URINE: NEGATIVE
LEUKOCYTE CLUMPS, URINE: ABNORMAL
NITRITE, URINE: NEGATIVE
PH, URINE: 5.5 (ref 5–9)
POST VOID RESIDUAL (PVR): 11 ML
PROTEIN, URINE: NEGATIVE MG/DL
SPECIFIC GRAVITY UA: 1.01 (ref 1–1.03)
UROBILINOGEN, URINE: 0.2 EU/DL (ref 0–1)

## 2024-10-17 PROCEDURE — 1159F MED LIST DOCD IN RCRD: CPT | Performed by: PHYSICIAN ASSISTANT

## 2024-10-17 PROCEDURE — 99213 OFFICE O/P EST LOW 20 MIN: CPT | Performed by: PHYSICIAN ASSISTANT

## 2024-10-17 PROCEDURE — 51798 US URINE CAPACITY MEASURE: CPT | Performed by: PHYSICIAN ASSISTANT

## 2024-10-17 PROCEDURE — 1123F ACP DISCUSS/DSCN MKR DOCD: CPT | Performed by: PHYSICIAN ASSISTANT

## 2024-10-17 PROCEDURE — 81003 URINALYSIS AUTO W/O SCOPE: CPT | Performed by: PHYSICIAN ASSISTANT

## 2024-10-17 NOTE — PROGRESS NOTES
[facility] [place] [facility]  [facility] UROLOGY  [address]  [address]  [place] [address]  Dept: [phone]  Loc: [phone]      Ms. Ayala was seen in follow up for   Chief Complaint   Patient presents with    Follow-up     RCC/OAB        HPI:  Ms. 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 Her serial imaging and labs have been negative for metastatic disease. She did undergo an office cystoscopy with Dr. Dial in January 2019 for an elevated Bladder Cx of 0.24. Her cystoscopy was unremarkable. Repeat testing in May 2019 was within normal limits at 0.11. Her cytoscopy was found to be normal. She denies HA, chest pain, dyspnea, fever/chills, night sweats, irregular bone pain, N/V, leg pain, hemoptysis, and lightheadedness.       In regards to her urinary health, she denies dysuria, hematuria, weakened stream, urinary retention, or flank pain. She reports frequency and urgency but states that the Vesicare 5 mg daily is improving her symptoms. She is having regular bowel movements.  She presents today for further evaluation.    Past Medical History:   Diagnosis Date    Anxiety     Arthritis     Asthma     Atherosclerotic heart disease of native coronary artery with unspecified angina pectoris 08/03/2023    Breast cancer, right (HCC) 08/19/2024    IDC with DCIS ER+ MI+ HER2-    Cancer (HCC)     Depression     Diabetes mellitus (HCC)     Headache(784.0)     History of colon polyps     2-4 in lifetime, gets high risk screening colonoscopy every 3 yrs    Hyperlipidemia     Hypertension     Nausea & vomiting     Persistent proteinuria associated with type 2 diabetes mellitus (HCC)     PONV (postoperative nausea and vomiting)     Renal cell carcinoma of left kidney (HCC) 11/21/2014    Tx: surgery only, partial left nephrectomy    Unspecified cerebral

## 2024-10-18 ENCOUNTER — HOSPITAL ENCOUNTER (OUTPATIENT)
Dept: RADIATION ONCOLOGY | Age: 79
Discharge: HOME | End: 2024-10-18
Payer: MEDICARE

## 2024-10-18 PROCEDURE — 77385 HC NTSTY MODUL RAD TX DLVR SMPL: CPT | Performed by: RADIOLOGY

## 2024-10-21 ENCOUNTER — HOSPITAL ENCOUNTER (OUTPATIENT)
Dept: RADIATION ONCOLOGY | Age: 79
Discharge: HOME | End: 2024-10-21
Payer: MEDICARE

## 2024-10-21 PROCEDURE — 77014 CHG CT GUIDANCE RADIATION THERAPY FLDS PLACEMENT: CPT | Performed by: RADIOLOGY

## 2024-10-21 PROCEDURE — 77385 HC NTSTY MODUL RAD TX DLVR SMPL: CPT | Performed by: RADIOLOGY

## 2024-10-21 NOTE — DISCHARGE INSTRUCTIONS
PATIENT DISCHARGE INSTRUCTIONS    Remember that side effects present at the end of your treatments will improve within a few weeks after the last treatment.  Eat well balanced meals even though your treatments are finished.  This will help speed the healing process. Continue any special diets prescribed to control side effects until these side effects have been resolved.  Get plenty of rest.  If you have experienced fatigue and/or weakness, this may continue for several weeks after your last treatment.  Continue with your daily activities according to the way you feel.  Continue to be gentle with your skin.  Follow your present skin care instructions until your follow-up visit.  IF YOU DEVELOP ANY CHANGES IN YOUR SKIN IN THE AREA TREATED WITH RADIATION, PLEASE CALL THE RADIATION ONCOLOGY NURSE -[phone].  Protect your skin from any injury and avoid direct sun exposure in the treatment area.  The skin in the treated area may always be more sensitive than the rest of your skin.  Always use SPF 30 or higher sun block if you will be in the sun and cannot avoid exposure.  Please contact your referring physician for a follow-up appointment in addition to your Radiation Oncology appointment.  Presence of pain:   Medication Taper: No    See Instructions Dated: N/A  Follow up orders: Will be discussed at Follow-Up

## 2024-10-23 ENCOUNTER — HOSPITAL ENCOUNTER (OUTPATIENT)
Dept: RADIATION ONCOLOGY | Age: 79
Discharge: HOME | End: 2024-10-23
Payer: MEDICARE

## 2024-10-23 PROCEDURE — 77385 HC NTSTY MODUL RAD TX DLVR SMPL: CPT | Performed by: RADIOLOGY

## 2024-10-25 ENCOUNTER — HOSPITAL ENCOUNTER (OUTPATIENT)
Dept: RADIATION ONCOLOGY | Age: 79
Discharge: HOME | End: 2024-10-25
Payer: MEDICARE

## 2024-10-25 PROCEDURE — 77385 HC NTSTY MODUL RAD TX DLVR SMPL: CPT | Performed by: RADIOLOGY

## 2024-10-28 ENCOUNTER — HOSPITAL ENCOUNTER (OUTPATIENT)
Dept: RADIATION ONCOLOGY | Age: 79
Discharge: HOME | End: 2024-10-28
Payer: MEDICARE

## 2024-10-28 VITALS
SYSTOLIC BLOOD PRESSURE: 150 MMHG | OXYGEN SATURATION: 98 % | HEART RATE: 69 BPM | BODY MASS INDEX: 34.28 KG/M2 | DIASTOLIC BLOOD PRESSURE: 75 MMHG | WEIGHT: 206 LBS | TEMPERATURE: 97.9 F | RESPIRATION RATE: 18 BRPM

## 2024-10-28 PROCEDURE — 77014 CHG CT GUIDANCE RADIATION THERAPY FLDS PLACEMENT: CPT | Performed by: RADIOLOGY

## 2024-10-28 PROCEDURE — 77385 HC NTSTY MODUL RAD TX DLVR SMPL: CPT | Performed by: RADIOLOGY

## 2024-10-29 ENCOUNTER — OFFICE VISIT (OUTPATIENT)
Dept: FAMILY MEDICINE CLINIC | Age: 79
End: 2024-10-29

## 2024-10-29 VITALS
BODY MASS INDEX: 34.38 KG/M2 | RESPIRATION RATE: 20 BRPM | SYSTOLIC BLOOD PRESSURE: 128 MMHG | WEIGHT: 206.6 LBS | DIASTOLIC BLOOD PRESSURE: 72 MMHG | OXYGEN SATURATION: 97 % | TEMPERATURE: 97.5 F | HEART RATE: 78 BPM

## 2024-10-29 DIAGNOSIS — B96.89 ACUTE BACTERIAL SINUSITIS: Primary | ICD-10-CM

## 2024-10-29 DIAGNOSIS — J01.90 ACUTE BACTERIAL SINUSITIS: Primary | ICD-10-CM

## 2024-10-29 RX ORDER — ANASTROZOLE 1 MG/1
1 TABLET ORAL DAILY
COMMUNITY
Start: 2024-09-25

## 2024-10-29 ASSESSMENT — ENCOUNTER SYMPTOMS
EYE PAIN: 0
VOMITING: 0
WHEEZING: 0
SORE THROAT: 1
CONSTIPATION: 0
CHEST TIGHTNESS: 0
SHORTNESS OF BREATH: 0
RHINORRHEA: 0
BLOOD IN STOOL: 0
COUGH: 1
NAUSEA: 0
ABDOMINAL PAIN: 0
BACK PAIN: 0
DIARRHEA: 0

## 2024-10-29 NOTE — PROGRESS NOTES
Appearance: She is well-developed.   HENT:      Head: Normocephalic and atraumatic.      Right Ear: External ear normal.      Left Ear: External ear normal.      Nose:      Right Sinus: Maxillary sinus tenderness present.      Left Sinus: Maxillary sinus tenderness present.      Mouth/Throat:      Mouth: Mucous membranes are moist.   Eyes:      Pupils: Pupils are equal, round, and reactive to light.   Neck:      Thyroid: No thyromegaly.   Cardiovascular:      Rate and Rhythm: Normal rate and regular rhythm.      Heart sounds: Normal heart sounds.   Pulmonary:      Breath sounds: Normal breath sounds. No wheezing or rales.   Abdominal:      General: Bowel sounds are normal.      Palpations: Abdomen is soft.      Tenderness: There is no abdominal tenderness. There is no guarding or rebound.   Musculoskeletal:         General: Normal range of motion.      Cervical back: Neck supple.   Lymphadenopathy:      Cervical: Cervical adenopathy present.      Right cervical: Superficial cervical adenopathy present.      Left cervical: Superficial cervical adenopathy present.   Skin:     General: Skin is warm and dry.      Findings: No rash.   Neurological:      Mental Status: She is alert and oriented to person, place, and time.      Cranial Nerves: No cranial nerve deficit.      Deep Tendon Reflexes: Reflexes are normal and symmetric.                  An electronic signature was used to authenticate this note.    --Jesus Giron MD

## 2024-11-11 ENCOUNTER — IMMUNIZATION (OUTPATIENT)
Dept: FAMILY MEDICINE CLINIC | Age: 79
End: 2024-11-11
Payer: MEDICARE

## 2024-11-11 DIAGNOSIS — Z23 NEED FOR INFLUENZA VACCINATION: Primary | ICD-10-CM

## 2024-11-11 PROCEDURE — 90653 IIV ADJUVANT VACCINE IM: CPT | Performed by: FAMILY MEDICINE

## 2024-11-11 PROCEDURE — G0008 ADMIN INFLUENZA VIRUS VAC: HCPCS | Performed by: FAMILY MEDICINE

## 2024-11-11 NOTE — PROGRESS NOTES
Immunizations Administered       Name Date Dose Route    Influenza, FLUAD, (age 65 y+), IM, Trivalent PF, 0.5mL 11/11/2024 0.5 mL Intramuscular    Site: Deltoid- Left    Lot: [id number]    NDC: 02241-954-34

## 2024-12-03 ENCOUNTER — HOSPITAL ENCOUNTER (OUTPATIENT)
Dept: RADIATION ONCOLOGY | Age: 79
Discharge: HOME | End: 2024-12-03
Payer: MEDICARE

## 2024-12-03 VITALS
RESPIRATION RATE: 18 BRPM | OXYGEN SATURATION: 96 % | SYSTOLIC BLOOD PRESSURE: 119 MMHG | TEMPERATURE: 98 F | WEIGHT: 206 LBS | DIASTOLIC BLOOD PRESSURE: 70 MMHG | HEART RATE: 78 BPM | BODY MASS INDEX: 34.28 KG/M2

## 2024-12-03 PROCEDURE — 99212 OFFICE O/P EST SF 10 MIN: CPT

## 2024-12-03 ASSESSMENT — ENCOUNTER SYMPTOMS
BLOOD IN STOOL: 0
BACK PAIN: 1
RECTAL PAIN: 0
ABDOMINAL PAIN: 0
SHORTNESS OF BREATH: 0
TROUBLE SWALLOWING: 1
NAUSEA: 0
COUGH: 0
DIARRHEA: 0

## 2024-12-03 NOTE — PROGRESS NOTES
Neurological:      Mental Status: She is alert and oriented to person, place, and time.         ATTESTATION: 20 minutes were spent with the patient at today's visit reviewing pertinent information related to their oncologic diagnosis, including any recent labs, imaging, follow ups and plan of care going forward. >50% of time spent in counseling and coordinating care.    CC: Dr. Crow   ACC:[facility]

## 2024-12-17 ENCOUNTER — OFFICE VISIT (OUTPATIENT)
Dept: FAMILY MEDICINE CLINIC | Age: 79
End: 2024-12-17
Payer: MEDICARE

## 2024-12-17 VITALS
WEIGHT: 204.4 LBS | TEMPERATURE: 97.4 F | SYSTOLIC BLOOD PRESSURE: 128 MMHG | HEART RATE: 58 BPM | DIASTOLIC BLOOD PRESSURE: 86 MMHG | RESPIRATION RATE: 20 BRPM | BODY MASS INDEX: 34.01 KG/M2 | OXYGEN SATURATION: 94 %

## 2024-12-17 DIAGNOSIS — B96.89 ACUTE BACTERIAL SINUSITIS: Primary | ICD-10-CM

## 2024-12-17 DIAGNOSIS — J01.90 ACUTE BACTERIAL SINUSITIS: Primary | ICD-10-CM

## 2024-12-17 PROCEDURE — 1123F ACP DISCUSS/DSCN MKR DOCD: CPT | Performed by: FAMILY MEDICINE

## 2024-12-17 PROCEDURE — 99213 OFFICE O/P EST LOW 20 MIN: CPT | Performed by: FAMILY MEDICINE

## 2024-12-17 PROCEDURE — 1160F RVW MEDS BY RX/DR IN RCRD: CPT | Performed by: FAMILY MEDICINE

## 2024-12-17 PROCEDURE — 1159F MED LIST DOCD IN RCRD: CPT | Performed by: FAMILY MEDICINE

## 2024-12-17 ASSESSMENT — ENCOUNTER SYMPTOMS
SINUS PRESSURE: 1
WHEEZING: 0
BACK PAIN: 0
DIARRHEA: 0
BLOOD IN STOOL: 0
CHEST TIGHTNESS: 0
VOMITING: 0
RHINORRHEA: 0
NAUSEA: 0
COUGH: 1
CONSTIPATION: 0
EYE PAIN: 0
SORE THROAT: 0
SHORTNESS OF BREATH: 0
ABDOMINAL PAIN: 0

## 2024-12-17 NOTE — PROGRESS NOTES
Benita Ayala (:  1945) is a 79 y.o. female,Established patient, here for evaluation of the following chief complaint(s):  Headache (Congestion, sinus drainage, facial pain and pressure x 4 days)         Assessment & Plan  Acute bacterial sinusitis   Acute condition, new, add abx  -monitor sxs, call if not improving    Orders:    amoxicillin-clavulanate (AUGMENTIN) 875-125 MG per tablet; Take 1 tablet by mouth 2 times daily for 10 days      No follow-ups on file.       Subjective   Headache     Patient here today for a check up.  Reviewed BMI of 34.  Encouraged diet, exercise and weight loss. 4 days of headaches, sinus pressure and drainage.  Facial pains.  No fevers or chills.  Some cough.  Using tylenol.  , non smoker, pmh reviewed.       Review of Systems   Constitutional:  Negative for chills, fatigue, fever and unexpected weight change.   HENT:  Positive for congestion, postnasal drip and sinus pressure. Negative for ear pain, rhinorrhea and sore throat.    Eyes:  Negative for pain and visual disturbance.   Respiratory:  Positive for cough. Negative for chest tightness, shortness of breath and wheezing.    Cardiovascular:  Negative for chest pain and palpitations.   Gastrointestinal:  Negative for abdominal pain, blood in stool, constipation, diarrhea, nausea and vomiting.   Genitourinary:  Negative for difficulty urinating, frequency, hematuria and urgency.   Musculoskeletal:  Negative for back pain, joint swelling, myalgias and neck pain.   Skin:  Negative for rash.   Neurological:  Positive for headaches. Negative for dizziness.   Hematological:  Negative for adenopathy. Does not bruise/bleed easily.   Psychiatric/Behavioral:  Negative for behavioral problems and sleep disturbance. The patient is not nervous/anxious.           Objective   Physical Exam  Vitals and nursing note reviewed.   Constitutional:       Appearance: She is well-developed.   HENT:      Head: Normocephalic and

## 2025-04-09 ENCOUNTER — TRANSCRIBE ORDERS (OUTPATIENT)
Dept: ADMINISTRATIVE | Age: 80
End: 2025-04-09

## 2025-04-09 DIAGNOSIS — C50.411 MALIGNANT NEOPLASM OF UPPER-OUTER QUADRANT OF RIGHT BREAST IN FEMALE, ESTROGEN RECEPTOR POSITIVE: Primary | ICD-10-CM

## 2025-04-09 DIAGNOSIS — Z17.0 MALIGNANT NEOPLASM OF UPPER-OUTER QUADRANT OF RIGHT BREAST IN FEMALE, ESTROGEN RECEPTOR POSITIVE: Primary | ICD-10-CM

## 2025-04-16 ENCOUNTER — TELEPHONE (OUTPATIENT)
Dept: UROLOGY | Age: 80
End: 2025-04-16

## 2025-04-16 ENCOUNTER — OFFICE VISIT (OUTPATIENT)
Dept: FAMILY MEDICINE CLINIC | Age: 80
End: 2025-04-16
Payer: MEDICARE

## 2025-04-16 VITALS
HEIGHT: 65 IN | DIASTOLIC BLOOD PRESSURE: 78 MMHG | OXYGEN SATURATION: 92 % | TEMPERATURE: 98.2 F | RESPIRATION RATE: 24 BRPM | SYSTOLIC BLOOD PRESSURE: 126 MMHG | BODY MASS INDEX: 33.22 KG/M2 | HEART RATE: 75 BPM | WEIGHT: 199.4 LBS

## 2025-04-16 DIAGNOSIS — J01.90 ACUTE BACTERIAL SINUSITIS: ICD-10-CM

## 2025-04-16 DIAGNOSIS — Z00.00 MEDICARE ANNUAL WELLNESS VISIT, SUBSEQUENT: Primary | ICD-10-CM

## 2025-04-16 DIAGNOSIS — B96.89 ACUTE BACTERIAL SINUSITIS: ICD-10-CM

## 2025-04-16 PROCEDURE — 1159F MED LIST DOCD IN RCRD: CPT | Performed by: FAMILY MEDICINE

## 2025-04-16 PROCEDURE — G0439 PPPS, SUBSEQ VISIT: HCPCS | Performed by: FAMILY MEDICINE

## 2025-04-16 PROCEDURE — 1160F RVW MEDS BY RX/DR IN RCRD: CPT | Performed by: FAMILY MEDICINE

## 2025-04-16 PROCEDURE — 99213 OFFICE O/P EST LOW 20 MIN: CPT | Performed by: FAMILY MEDICINE

## 2025-04-16 PROCEDURE — 1123F ACP DISCUSS/DSCN MKR DOCD: CPT | Performed by: FAMILY MEDICINE

## 2025-04-16 RX ORDER — SILVER SULFADIAZINE 10 MG/G
CREAM TOPICAL DAILY
COMMUNITY

## 2025-04-16 SDOH — ECONOMIC STABILITY: FOOD INSECURITY: WITHIN THE PAST 12 MONTHS, YOU WORRIED THAT YOUR FOOD WOULD RUN OUT BEFORE YOU GOT MONEY TO BUY MORE.: NEVER TRUE

## 2025-04-16 SDOH — ECONOMIC STABILITY: FOOD INSECURITY: WITHIN THE PAST 12 MONTHS, THE FOOD YOU BOUGHT JUST DIDN'T LAST AND YOU DIDN'T HAVE MONEY TO GET MORE.: NEVER TRUE

## 2025-04-16 ASSESSMENT — PATIENT HEALTH QUESTIONNAIRE - PHQ9
3. TROUBLE FALLING OR STAYING ASLEEP: SEVERAL DAYS
10. IF YOU CHECKED OFF ANY PROBLEMS, HOW DIFFICULT HAVE THESE PROBLEMS MADE IT FOR YOU TO DO YOUR WORK, TAKE CARE OF THINGS AT HOME, OR GET ALONG WITH OTHER PEOPLE: NOT DIFFICULT AT ALL
6. FEELING BAD ABOUT YOURSELF - OR THAT YOU ARE A FAILURE OR HAVE LET YOURSELF OR YOUR FAMILY DOWN: NOT AT ALL
SUM OF ALL RESPONSES TO PHQ QUESTIONS 1-9: 4
9. THOUGHTS THAT YOU WOULD BE BETTER OFF DEAD, OR OF HURTING YOURSELF: NOT AT ALL
SUM OF ALL RESPONSES TO PHQ QUESTIONS 1-9: 4
2. FEELING DOWN, DEPRESSED OR HOPELESS: NOT AT ALL
SUM OF ALL RESPONSES TO PHQ QUESTIONS 1-9: 4
SUM OF ALL RESPONSES TO PHQ QUESTIONS 1-9: 4
8. MOVING OR SPEAKING SO SLOWLY THAT OTHER PEOPLE COULD HAVE NOTICED. OR THE OPPOSITE, BEING SO FIGETY OR RESTLESS THAT YOU HAVE BEEN MOVING AROUND A LOT MORE THAN USUAL: SEVERAL DAYS
7. TROUBLE CONCENTRATING ON THINGS, SUCH AS READING THE NEWSPAPER OR WATCHING TELEVISION: SEVERAL DAYS
5. POOR APPETITE OR OVEREATING: NOT AT ALL
1. LITTLE INTEREST OR PLEASURE IN DOING THINGS: NOT AT ALL
4. FEELING TIRED OR HAVING LITTLE ENERGY: SEVERAL DAYS

## 2025-04-16 ASSESSMENT — ENCOUNTER SYMPTOMS
EYE PAIN: 0
CHEST TIGHTNESS: 0
WHEEZING: 0
ABDOMINAL PAIN: 0
SHORTNESS OF BREATH: 0
SINUS PRESSURE: 1
VOMITING: 0
SINUS PAIN: 1
CONSTIPATION: 0
COUGH: 1
BLOOD IN STOOL: 0
SORE THROAT: 1
NAUSEA: 0
RHINORRHEA: 0
BACK PAIN: 0
DIARRHEA: 0

## 2025-04-16 NOTE — TELEPHONE ENCOUNTER
Patient scheduled for US RENAL   at [facility] on 10/19/25.  Arrival of 1000 for a 1015 scan time.  Order mailed with instructions or given to the patient in the office     ORDERS AND INSTRUCTIONS MAILED

## 2025-04-16 NOTE — PATIENT INSTRUCTIONS
improves your energy and can help you sleep better.  Muscle-strengthening.  This type of activity can help maintain muscle and strengthen bones.  Includes climbing stairs, using resistance bands, and lifting or carrying heavy loads.  Aim for at least twice a week.  It can help protect the knees and other joints.  Stretching.  Stretching gives you better range of motion in joints and muscles.  Includes upper arm stretches, calf stretches, and gentle yoga.  Aim for at least twice a week, preferably after your muscles are warmed up from other activities.  It can help you function better in daily life.  Balancing.  This helps you stay coordinated and have good posture.  Includes heel-to-toe walking, jose chi, and certain types of yoga.  Aim for at least 3 days a week.  It can reduce your risk of falling.  Even if you have a hard time meeting the recommendations, it's better to be more active than less active. All activity done in each category counts toward your weekly total. You'd be surprised how daily things like carrying groceries, keeping up with grandchildren, and taking the stairs can add up.  What keeps you from being active?  If you've had a hard time being more active, you're not alone. Maybe you remember being able to do more. Or maybe you've never thought of yourself as being active. It's frustrating when you can't do the things you want. Being more active can help. What's holding you back?  Getting started.  Have a goal, but break it into easy tasks. Small steps build into big accomplishments.  Staying motivated.  If you feel like skipping your activity, remember your goal. Maybe you want to move better and stay independent. Every activity gets you one step closer.  Not feeling your best.  Start with 5 minutes of an activity you enjoy. Prove to yourself you can do it. As you get comfortable, increase your time.  You may not be where you want to be. But you're in the process of getting there. Everyone starts

## 2025-04-16 NOTE — PROGRESS NOTES
Medicare Annual Wellness Visit    Benita Ayala is here for Medicare AWV (Pharyngitis, cough, sinus pain/pressure, body aches, fever, chills x2 days)    Assessment & Plan   Medicare annual wellness visit, subsequent  Acute bacterial sinusitis  -     amoxicillin-clavulanate (AUGMENTIN) 875-125 MG per tablet; Take 1 tablet by mouth 2 times daily for 10 days, Disp-20 tablet, R-0Normal  Encouraged diet, exercise and weight loss.  Reviewed health maintenance       Return in 1 year (on 4/16/2026) for Medicare AWV.     Subjective       Patient's complete Health Risk Assessment and screening values have been reviewed and are found in Flowsheets. The following problems were reviewed today and where indicated follow up appointments were made and/or referrals ordered.    Positive Risk Factor Screenings with Interventions:     Cognitive:   Clock Drawing Test (CDT): (!) Abnormal  Words recalled: 3 Words Recalled  Total Score: 3  Total Score Interpretation: Normal Mini-Cog  Interventions:  Patient declines any further evaluation or treatment            Inactivity:  On average, how many days per week do you engage in moderate to strenuous exercise (like a brisk walk)?: 0 days (!) Abnormal  On average, how many minutes do you engage in exercise at this level?: 0 min  Interventions:  Patient declined any further interventions or treatment     Abnormal BMI (obese):  Body mass index is 33.7 kg/m². (!) Abnormal  Interventions:  See AVS for additional education material          Vision Screen:  Do you have difficulty driving, watching TV, or doing any of your daily activities because of your eyesight?: No  Have you had an eye exam within the past year?: (!) No  Interventions:   Patient encouraged to make appointment with their eye specialist      Advanced Directives:  Do you have a Living Will?: (!) No    Intervention:  has NO advanced directive - information provided                     Objective   Vitals:    04/16/25 1329   BP:

## 2025-04-16 NOTE — PROGRESS NOTES
Benita Ayala (:  1945) is a 80 y.o. female,Established patient, here for evaluation of the following chief complaint(s):  Medicare AWV (Pharyngitis, cough, sinus pain/pressure, body aches, fever, chills x2 days)         Assessment & Plan  Medicare annual wellness visit, subsequent            Acute bacterial sinusitis   Acute condition, new, add abx  -monitor sxs, call if not improving    Orders:    amoxicillin-clavulanate (AUGMENTIN) 875-125 MG per tablet; Take 1 tablet by mouth 2 times daily for 10 days      Return in 1 year (on 2026) for Medicare AWV.       Subjective   HPI  Pt seen today due to 2 days of sore throat, cough, sinus pain and pressure, body aches, fever and chills.  Phlegm production.   is also ill.  Reviewed BMI of 34.  Encouraged diet, exercise and weight loss. , non smoker, pmh reviewed.       Review of Systems   Constitutional:  Positive for chills and fever. Negative for fatigue and unexpected weight change.   HENT:  Positive for sinus pressure, sinus pain and sore throat. Negative for congestion, ear pain and rhinorrhea.    Eyes:  Negative for pain and visual disturbance.   Respiratory:  Positive for cough. Negative for chest tightness, shortness of breath and wheezing.    Cardiovascular:  Negative for chest pain and palpitations.   Gastrointestinal:  Negative for abdominal pain, blood in stool, constipation, diarrhea, nausea and vomiting.   Genitourinary:  Negative for difficulty urinating, frequency, hematuria and urgency.   Musculoskeletal:  Positive for myalgias. Negative for back pain, joint swelling and neck pain.   Skin:  Negative for rash.   Neurological:  Negative for dizziness and headaches.   Hematological:  Negative for adenopathy. Does not bruise/bleed easily.   Psychiatric/Behavioral:  Negative for behavioral problems and sleep disturbance. The patient is not nervous/anxious.           Objective   Physical Exam--see other note             An

## 2025-05-07 ENCOUNTER — HOSPITAL ENCOUNTER (OUTPATIENT)
Dept: WOMENS IMAGING | Age: 80
Discharge: HOME | End: 2025-05-07
Attending: INTERNAL MEDICINE
Payer: MEDICARE

## 2025-05-07 DIAGNOSIS — Z17.0 MALIGNANT NEOPLASM OF UPPER-OUTER QUADRANT OF RIGHT BREAST IN FEMALE, ESTROGEN RECEPTOR POSITIVE (HCC): ICD-10-CM

## 2025-05-07 DIAGNOSIS — C50.411 MALIGNANT NEOPLASM OF UPPER-OUTER QUADRANT OF RIGHT BREAST IN FEMALE, ESTROGEN RECEPTOR POSITIVE (HCC): ICD-10-CM

## 2025-05-07 PROCEDURE — G0279 TOMOSYNTHESIS, MAMMO: HCPCS

## 2025-06-17 ENCOUNTER — OFFICE VISIT (OUTPATIENT)
Dept: FAMILY MEDICINE CLINIC | Age: 80
End: 2025-06-17
Payer: MEDICARE

## 2025-06-17 VITALS
WEIGHT: 201.6 LBS | TEMPERATURE: 98 F | HEART RATE: 68 BPM | RESPIRATION RATE: 14 BRPM | OXYGEN SATURATION: 93 % | BODY MASS INDEX: 34.07 KG/M2 | SYSTOLIC BLOOD PRESSURE: 132 MMHG | DIASTOLIC BLOOD PRESSURE: 76 MMHG

## 2025-06-17 DIAGNOSIS — J01.90 ACUTE BACTERIAL SINUSITIS: Primary | ICD-10-CM

## 2025-06-17 DIAGNOSIS — B96.89 ACUTE BACTERIAL SINUSITIS: Primary | ICD-10-CM

## 2025-06-17 DIAGNOSIS — M62.838 MUSCLE SPASM: ICD-10-CM

## 2025-06-17 DIAGNOSIS — J45.40 MODERATE PERSISTENT ASTHMA WITHOUT COMPLICATION: ICD-10-CM

## 2025-06-17 DIAGNOSIS — F33.1 MAJOR DEPRESSIVE DISORDER, RECURRENT, MODERATE (HCC): ICD-10-CM

## 2025-06-17 PROCEDURE — 1160F RVW MEDS BY RX/DR IN RCRD: CPT | Performed by: FAMILY MEDICINE

## 2025-06-17 PROCEDURE — 1123F ACP DISCUSS/DSCN MKR DOCD: CPT | Performed by: FAMILY MEDICINE

## 2025-06-17 PROCEDURE — G2211 COMPLEX E/M VISIT ADD ON: HCPCS | Performed by: FAMILY MEDICINE

## 2025-06-17 PROCEDURE — 1159F MED LIST DOCD IN RCRD: CPT | Performed by: FAMILY MEDICINE

## 2025-06-17 PROCEDURE — 99214 OFFICE O/P EST MOD 30 MIN: CPT | Performed by: FAMILY MEDICINE

## 2025-06-17 ASSESSMENT — ENCOUNTER SYMPTOMS
WHEEZING: 0
VOMITING: 0
EYE PAIN: 0
RHINORRHEA: 0
CHEST TIGHTNESS: 0
ABDOMINAL PAIN: 0
CONSTIPATION: 0
NAUSEA: 0
SHORTNESS OF BREATH: 0
COUGH: 1
BLOOD IN STOOL: 0
SORE THROAT: 1
BACK PAIN: 0
DIARRHEA: 0

## 2025-06-17 NOTE — PROGRESS NOTES
Benita Ayala (:  1945) is a 80 y.o. female,Established patient, here for evaluation of the following chief complaint(s):  Follow-up (Sore throat, headache 4days ago )         Assessment & Plan  Acute bacterial sinusitis   Acute condition, new, add abx  -monitor sxs, call if not improving    Orders:    amoxicillin-clavulanate (AUGMENTIN) 875-125 MG per tablet; Take 1 tablet by mouth 2 times daily for 10 days    Muscle spasm   Acute condition, new, add zanaflex as needed  -monitor sxs, call if not improving    Orders:    tiZANidine (ZANAFLEX) 4 MG tablet; Take 1 tablet by mouth every 8 hours as needed (spasms)    Major depressive disorder, recurrent, moderate (HCC)   Chronic, at goal (stable), continue current treatment plan         Moderate persistent asthma without complication   Chronic, at goal (stable), continue current treatment plan           No follow-ups on file.       Subjective   HPI   Patient here today for a check up.  Reviewed BMI of 34.  Encouraged diet, exercise and weight loss.  4 days of sore throat, headaches, coughing.  Fever and chills.  Using tylenol.  , nonsmoker, pmh reviewed.       Review of Systems   Constitutional:  Positive for chills and fever. Negative for fatigue and unexpected weight change.   HENT:  Positive for sore throat. Negative for congestion, ear pain and rhinorrhea.    Eyes:  Negative for pain and visual disturbance.   Respiratory:  Positive for cough. Negative for chest tightness, shortness of breath and wheezing.    Cardiovascular:  Negative for chest pain and palpitations.   Gastrointestinal:  Negative for abdominal pain, blood in stool, constipation, diarrhea, nausea and vomiting.   Genitourinary:  Negative for difficulty urinating, frequency, hematuria and urgency.   Musculoskeletal:  Negative for back pain, joint swelling, myalgias and neck pain.   Skin:  Negative for rash.   Neurological:  Positive for headaches. Negative for dizziness.

## 2025-07-02 ENCOUNTER — TELEPHONE (OUTPATIENT)
Dept: FAMILY MEDICINE CLINIC | Age: 80
End: 2025-07-02

## 2025-07-02 DIAGNOSIS — R05.1 ACUTE COUGH: ICD-10-CM

## 2025-07-02 DIAGNOSIS — B96.89 ACUTE BACTERIAL SINUSITIS: Primary | ICD-10-CM

## 2025-07-02 DIAGNOSIS — J01.90 ACUTE BACTERIAL SINUSITIS: Primary | ICD-10-CM

## 2025-07-02 RX ORDER — AZITHROMYCIN 250 MG/1
TABLET, FILM COATED ORAL
Qty: 1 PACKET | Refills: 0 | Status: SHIPPED | OUTPATIENT
Start: 2025-07-02 | End: 2025-07-12

## 2025-07-02 RX ORDER — BENZONATATE 200 MG/1
200 CAPSULE ORAL 3 TIMES DAILY PRN
Qty: 30 CAPSULE | Refills: 0 | Status: SHIPPED | OUTPATIENT
Start: 2025-07-02

## 2025-07-02 NOTE — TELEPHONE ENCOUNTER
Pt Benita Ayala finished antibiotic 2 d ago and says she never got better and the cough still cont very harsh. No new symptoms.    Pls advise next step. Pls call pt at  to reply.    Last ov: 6/17/25 sinusitis

## 2025-08-06 ENCOUNTER — RESULTS FOLLOW-UP (OUTPATIENT)
Dept: FAMILY MEDICINE CLINIC | Age: 80
End: 2025-08-06

## 2025-08-06 ENCOUNTER — OFFICE VISIT (OUTPATIENT)
Dept: FAMILY MEDICINE CLINIC | Age: 80
End: 2025-08-06
Payer: MEDICARE

## 2025-08-06 VITALS
WEIGHT: 200.8 LBS | OXYGEN SATURATION: 95 % | SYSTOLIC BLOOD PRESSURE: 124 MMHG | BODY MASS INDEX: 34.28 KG/M2 | DIASTOLIC BLOOD PRESSURE: 84 MMHG | TEMPERATURE: 97.2 F | HEART RATE: 47 BPM | HEIGHT: 64 IN | RESPIRATION RATE: 20 BRPM

## 2025-08-06 DIAGNOSIS — E11.29 TYPE 2 DIABETES MELLITUS WITH MICROALBUMINURIA, WITHOUT LONG-TERM CURRENT USE OF INSULIN (HCC): ICD-10-CM

## 2025-08-06 DIAGNOSIS — I10 BENIGN ESSENTIAL HTN: ICD-10-CM

## 2025-08-06 DIAGNOSIS — Z00.00 ANNUAL PHYSICAL EXAM: Primary | ICD-10-CM

## 2025-08-06 DIAGNOSIS — J45.40 MODERATE PERSISTENT ASTHMA WITHOUT COMPLICATION: ICD-10-CM

## 2025-08-06 DIAGNOSIS — C64.9 RENAL CELL CARCINOMA, UNSPECIFIED LATERALITY (HCC): ICD-10-CM

## 2025-08-06 DIAGNOSIS — K21.9 GASTROESOPHAGEAL REFLUX DISEASE, UNSPECIFIED WHETHER ESOPHAGITIS PRESENT: ICD-10-CM

## 2025-08-06 DIAGNOSIS — R80.9 TYPE 2 DIABETES MELLITUS WITH MICROALBUMINURIA, WITHOUT LONG-TERM CURRENT USE OF INSULIN (HCC): ICD-10-CM

## 2025-08-06 DIAGNOSIS — E78.00 HYPERCHOLESTEROLEMIA: ICD-10-CM

## 2025-08-06 DIAGNOSIS — N18.31 STAGE 3A CHRONIC KIDNEY DISEASE (HCC): ICD-10-CM

## 2025-08-06 DIAGNOSIS — F32.A DEPRESSION, UNSPECIFIED DEPRESSION TYPE: ICD-10-CM

## 2025-08-06 DIAGNOSIS — R60.0 PERIPHERAL EDEMA: ICD-10-CM

## 2025-08-06 DIAGNOSIS — N32.81 OAB (OVERACTIVE BLADDER): ICD-10-CM

## 2025-08-06 LAB
ALBUMIN SERPL BCG-MCNC: 3.9 G/DL (ref 3.4–4.9)
ALP SERPL-CCNC: 109 U/L (ref 38–126)
ALT SERPL W/O P-5'-P-CCNC: 9 U/L (ref 10–35)
ANION GAP SERPL CALC-SCNC: 12 MEQ/L (ref 8–16)
AST SERPL-CCNC: 15 U/L (ref 10–35)
BASOPHILS ABSOLUTE: 0.1 THOU/MM3 (ref 0–0.1)
BASOPHILS NFR BLD AUTO: 0.7 %
BILIRUB SERPL-MCNC: 0.5 MG/DL (ref 0.3–1.2)
BUN SERPL-MCNC: 11 MG/DL (ref 8–23)
CALCIUM SERPL-MCNC: 8.9 MG/DL (ref 8.8–10.2)
CHLORIDE SERPL-SCNC: 103 MEQ/L (ref 98–111)
CHOLEST SERPL-MCNC: 180 MG/DL (ref 100–199)
CO2 SERPL-SCNC: 26 MEQ/L (ref 22–29)
CREAT SERPL-MCNC: 0.9 MG/DL (ref 0.5–0.9)
CREAT UR-MCNC: 43.6 MG/DL
DEPRECATED MEAN GLUCOSE BLD GHB EST-ACNC: 123 MG/DL (ref 70–126)
DEPRECATED RDW RBC AUTO: 48.2 FL (ref 35–45)
EOSINOPHIL NFR BLD AUTO: 1.4 %
EOSINOPHILS ABSOLUTE: 0.1 THOU/MM3 (ref 0–0.4)
ERYTHROCYTE [DISTWIDTH] IN BLOOD BY AUTOMATED COUNT: 14 % (ref 11.5–14.5)
FOLATE SERPL-MCNC: 9.2 NG/ML (ref 4.6–34.8)
GFR SERPL CREATININE-BSD FRML MDRD: 65 ML/MIN/1.73M2
GLUCOSE SERPL-MCNC: 115 MG/DL (ref 74–109)
HBA1C MFR BLD HPLC: 6.1 % (ref 4–6)
HCT VFR BLD AUTO: 38.1 % (ref 37–47)
HDLC SERPL-MCNC: 55 MG/DL
HGB BLD-MCNC: 12.2 GM/DL (ref 12–16)
IMM GRANULOCYTES # BLD AUTO: 0.02 THOU/MM3 (ref 0–0.07)
IMM GRANULOCYTES NFR BLD AUTO: 0.3 %
LDLC SERPL CALC-MCNC: 105 MG/DL
LYMPHOCYTES ABSOLUTE: 2 THOU/MM3 (ref 1–4.8)
LYMPHOCYTES NFR BLD AUTO: 26.3 %
MAGNESIUM SERPL-MCNC: 2.1 MG/DL (ref 1.6–2.6)
MCH RBC QN AUTO: 30.1 PG (ref 26–33)
MCHC RBC AUTO-ENTMCNC: 32 GM/DL (ref 32.2–35.5)
MCV RBC AUTO: 94.1 FL (ref 81–99)
MICROALBUMIN UR-MCNC: 6.16 MG/DL
MICROALBUMIN/CREAT RATIO PNL UR: 141 MG/G (ref 0–30)
MONOCYTES ABSOLUTE: 0.6 THOU/MM3 (ref 0.4–1.3)
MONOCYTES NFR BLD AUTO: 8.1 %
NEUTROPHILS ABSOLUTE: 4.9 THOU/MM3 (ref 1.8–7.7)
NEUTROPHILS NFR BLD AUTO: 63.2 %
NRBC BLD AUTO-RTO: 0 /100 WBC
PLATELET # BLD AUTO: 250 THOU/MM3 (ref 130–400)
PMV BLD AUTO: 11.7 FL (ref 9.4–12.4)
POTASSIUM SERPL-SCNC: 3.9 MEQ/L (ref 3.5–5.2)
PROT SERPL-MCNC: 6.5 G/DL (ref 6.4–8.3)
RBC # BLD AUTO: 4.05 MILL/MM3 (ref 4.2–5.4)
SODIUM SERPL-SCNC: 141 MEQ/L (ref 135–145)
TRIGL SERPL-MCNC: 98 MG/DL (ref 0–199)
VIT B12 SERPL-MCNC: 534 PG/ML (ref 232–1245)
WBC # BLD AUTO: 7.7 THOU/MM3 (ref 4.8–10.8)

## 2025-08-06 PROCEDURE — 99397 PER PM REEVAL EST PAT 65+ YR: CPT | Performed by: FAMILY MEDICINE

## 2025-08-06 PROCEDURE — 1160F RVW MEDS BY RX/DR IN RCRD: CPT | Performed by: FAMILY MEDICINE

## 2025-08-06 PROCEDURE — 3079F DIAST BP 80-89 MM HG: CPT | Performed by: FAMILY MEDICINE

## 2025-08-06 PROCEDURE — 3074F SYST BP LT 130 MM HG: CPT | Performed by: FAMILY MEDICINE

## 2025-08-06 PROCEDURE — 1159F MED LIST DOCD IN RCRD: CPT | Performed by: FAMILY MEDICINE

## 2025-08-06 RX ORDER — PROPRANOLOL HYDROCHLORIDE 120 MG/1
120 CAPSULE, EXTENDED RELEASE ORAL DAILY
Qty: 90 CAPSULE | Refills: 3 | Status: SHIPPED | OUTPATIENT
Start: 2025-08-06

## 2025-08-06 RX ORDER — SOLIFENACIN SUCCINATE 10 MG/1
10 TABLET, FILM COATED ORAL DAILY
Qty: 90 TABLET | Refills: 3 | Status: SHIPPED | OUTPATIENT
Start: 2025-08-06

## 2025-08-06 RX ORDER — SIMVASTATIN 40 MG
40 TABLET ORAL NIGHTLY
Qty: 90 TABLET | Refills: 3 | Status: SHIPPED | OUTPATIENT
Start: 2025-08-06

## 2025-08-06 RX ORDER — FAMOTIDINE 20 MG/1
20 TABLET, FILM COATED ORAL 2 TIMES DAILY
Qty: 180 TABLET | Refills: 3 | Status: SHIPPED | OUTPATIENT
Start: 2025-08-06

## 2025-08-06 RX ORDER — BUMETANIDE 0.5 MG/1
0.5 TABLET ORAL DAILY
Qty: 90 TABLET | Refills: 3 | Status: SHIPPED | OUTPATIENT
Start: 2025-08-06

## 2025-08-06 RX ORDER — ALBUTEROL SULFATE 90 UG/1
AEROSOL, METERED RESPIRATORY (INHALATION)
Qty: 3 EACH | Refills: 3 | Status: SHIPPED | OUTPATIENT
Start: 2025-08-06

## 2025-08-06 RX ORDER — CITALOPRAM HYDROBROMIDE 20 MG/1
20 TABLET ORAL 2 TIMES DAILY
Qty: 180 TABLET | Refills: 3 | Status: SHIPPED | OUTPATIENT
Start: 2025-08-06

## 2025-08-06 RX ORDER — BUDESONIDE AND FORMOTEROL FUMARATE DIHYDRATE 160; 4.5 UG/1; UG/1
AEROSOL RESPIRATORY (INHALATION)
Qty: 3 EACH | Refills: 3 | Status: SHIPPED | OUTPATIENT
Start: 2025-08-06

## 2025-08-06 RX ORDER — OMEPRAZOLE 40 MG/1
40 CAPSULE, DELAYED RELEASE ORAL DAILY
Qty: 90 CAPSULE | Refills: 3 | Status: SHIPPED | OUTPATIENT
Start: 2025-08-06

## 2025-08-06 ASSESSMENT — ENCOUNTER SYMPTOMS
VOMITING: 0
NAUSEA: 0
EYE PAIN: 0
SHORTNESS OF BREATH: 0
CHEST TIGHTNESS: 0
BLOOD IN STOOL: 0
CONSTIPATION: 0
BACK PAIN: 0
WHEEZING: 0
DIARRHEA: 0
COUGH: 0
ABDOMINAL PAIN: 0
RHINORRHEA: 0
SORE THROAT: 0

## 2025-08-20 ENCOUNTER — OFFICE VISIT (OUTPATIENT)
Dept: FAMILY MEDICINE CLINIC | Age: 80
End: 2025-08-20
Payer: MEDICARE

## 2025-08-20 VITALS
BODY MASS INDEX: 33.8 KG/M2 | WEIGHT: 198 LBS | SYSTOLIC BLOOD PRESSURE: 128 MMHG | HEIGHT: 64 IN | DIASTOLIC BLOOD PRESSURE: 84 MMHG | HEART RATE: 64 BPM | OXYGEN SATURATION: 96 %

## 2025-08-20 DIAGNOSIS — E11.29 TYPE 2 DIABETES MELLITUS WITH MICROALBUMINURIA, WITHOUT LONG-TERM CURRENT USE OF INSULIN (HCC): Primary | ICD-10-CM

## 2025-08-20 DIAGNOSIS — E78.00 HYPERCHOLESTEROLEMIA: ICD-10-CM

## 2025-08-20 DIAGNOSIS — I10 BENIGN ESSENTIAL HTN: ICD-10-CM

## 2025-08-20 DIAGNOSIS — R80.9 TYPE 2 DIABETES MELLITUS WITH MICROALBUMINURIA, WITHOUT LONG-TERM CURRENT USE OF INSULIN (HCC): Primary | ICD-10-CM

## 2025-08-20 DIAGNOSIS — K21.9 GASTROESOPHAGEAL REFLUX DISEASE, UNSPECIFIED WHETHER ESOPHAGITIS PRESENT: ICD-10-CM

## 2025-08-20 PROCEDURE — 1160F RVW MEDS BY RX/DR IN RCRD: CPT | Performed by: FAMILY MEDICINE

## 2025-08-20 PROCEDURE — 99214 OFFICE O/P EST MOD 30 MIN: CPT | Performed by: FAMILY MEDICINE

## 2025-08-20 PROCEDURE — G2211 COMPLEX E/M VISIT ADD ON: HCPCS | Performed by: FAMILY MEDICINE

## 2025-08-20 PROCEDURE — 1159F MED LIST DOCD IN RCRD: CPT | Performed by: FAMILY MEDICINE

## 2025-08-20 PROCEDURE — 3074F SYST BP LT 130 MM HG: CPT | Performed by: FAMILY MEDICINE

## 2025-08-20 PROCEDURE — 3079F DIAST BP 80-89 MM HG: CPT | Performed by: FAMILY MEDICINE

## 2025-08-20 PROCEDURE — 3044F HG A1C LEVEL LT 7.0%: CPT | Performed by: FAMILY MEDICINE

## 2025-08-20 PROCEDURE — 1123F ACP DISCUSS/DSCN MKR DOCD: CPT | Performed by: FAMILY MEDICINE

## 2025-08-20 ASSESSMENT — ENCOUNTER SYMPTOMS
SHORTNESS OF BREATH: 0
CHEST TIGHTNESS: 0
ABDOMINAL PAIN: 1
DIARRHEA: 0
VOMITING: 0
RHINORRHEA: 0
NAUSEA: 0
SORE THROAT: 0
WHEEZING: 0
BLOOD IN STOOL: 0
BACK PAIN: 0
EYE PAIN: 0
CONSTIPATION: 0
COUGH: 0

## 2025-09-02 ENCOUNTER — TELEPHONE (OUTPATIENT)
Dept: FAMILY MEDICINE CLINIC | Age: 80
End: 2025-09-02

## 2025-09-02 ENCOUNTER — HOSPITAL ENCOUNTER (OUTPATIENT)
Dept: RADIATION ONCOLOGY | Age: 80
Discharge: HOME | End: 2025-09-02
Payer: MEDICARE

## 2025-09-02 VITALS
WEIGHT: 197.4 LBS | TEMPERATURE: 97.7 F | OXYGEN SATURATION: 93 % | HEART RATE: 79 BPM | DIASTOLIC BLOOD PRESSURE: 61 MMHG | BODY MASS INDEX: 33.57 KG/M2 | RESPIRATION RATE: 16 BRPM | SYSTOLIC BLOOD PRESSURE: 127 MMHG

## 2025-09-02 PROCEDURE — 99212 OFFICE O/P EST SF 10 MIN: CPT

## 2025-09-02 ASSESSMENT — ENCOUNTER SYMPTOMS
RECTAL PAIN: 0
VOMITING: 0
BACK PAIN: 1
NAUSEA: 0
SHORTNESS OF BREATH: 0
ABDOMINAL PAIN: 0
COUGH: 1
TROUBLE SWALLOWING: 1

## 2025-09-05 ENCOUNTER — OFFICE VISIT (OUTPATIENT)
Dept: CARDIOLOGY CLINIC | Age: 80
End: 2025-09-05
Payer: MEDICARE

## 2025-09-05 VITALS
BODY MASS INDEX: 33.53 KG/M2 | HEIGHT: 64 IN | HEART RATE: 73 BPM | DIASTOLIC BLOOD PRESSURE: 72 MMHG | WEIGHT: 196.4 LBS | SYSTOLIC BLOOD PRESSURE: 128 MMHG

## 2025-09-05 DIAGNOSIS — E78.01 FAMILIAL HYPERCHOLESTEROLEMIA: ICD-10-CM

## 2025-09-05 DIAGNOSIS — I10 PRIMARY HYPERTENSION: Primary | ICD-10-CM

## 2025-09-05 DIAGNOSIS — I25.10 CORONARY ARTERY DISEASE INVOLVING NATIVE CORONARY ARTERY OF NATIVE HEART WITHOUT ANGINA PECTORIS: ICD-10-CM

## 2025-09-05 PROCEDURE — 3074F SYST BP LT 130 MM HG: CPT | Performed by: NUCLEAR MEDICINE

## 2025-09-05 PROCEDURE — 93000 ELECTROCARDIOGRAM COMPLETE: CPT | Performed by: NUCLEAR MEDICINE

## 2025-09-05 PROCEDURE — 99213 OFFICE O/P EST LOW 20 MIN: CPT | Performed by: NUCLEAR MEDICINE

## 2025-09-05 PROCEDURE — 1159F MED LIST DOCD IN RCRD: CPT | Performed by: NUCLEAR MEDICINE

## 2025-09-05 PROCEDURE — 1123F ACP DISCUSS/DSCN MKR DOCD: CPT | Performed by: NUCLEAR MEDICINE

## 2025-09-05 PROCEDURE — 3078F DIAST BP <80 MM HG: CPT | Performed by: NUCLEAR MEDICINE

## (undated) DEVICE — SPECIMEN ORIENTATION CHARMS, SIX DISTINCTLY SHAPED STERILE 10MM CHARMS: Brand: MARGINMAP

## (undated) DEVICE — SUTURE VICRYL + SZ 3-0 L27IN ABSRB UD L26MM SH 1/2 CIR VCP416H

## (undated) DEVICE — SUTURE PERMA-HAND SZ 2-0 L30IN NONABSORBABLE BLK L26MM SH K833H

## (undated) DEVICE — BREAST HERNIA: Brand: MEDLINE INDUSTRIES, INC.

## (undated) DEVICE — BLADE,CARBON-STEEL,15,STRL,DISPOSABLE,TB: Brand: MEDLINE

## (undated) DEVICE — LIQUIBAND RAPID ADHESIVE 36/CS 0.8ML: Brand: MEDLINE

## (undated) DEVICE — GLOVE ORANGE PI 7 1/2   MSG9075

## (undated) DEVICE — APPLIER LIG CLP M L11IN TI STR RNG HNDL FOR 20 CLP DISP

## (undated) DEVICE — SUTURE VICRYL + SZ 4-0 L27IN ABSRB WHT FS-2 3/8 CIR REV CUT VCP422H

## (undated) DEVICE — APPLICATOR MEDICATED 26 CC SOLUTION HI LT ORNG CHLORAPREP

## (undated) DEVICE — PENCIL SMK EVAC 15FT BLADE ELECTRD ROCKER F/TELSCP